# Patient Record
Sex: FEMALE | Race: OTHER | HISPANIC OR LATINO | URBAN - METROPOLITAN AREA
[De-identification: names, ages, dates, MRNs, and addresses within clinical notes are randomized per-mention and may not be internally consistent; named-entity substitution may affect disease eponyms.]

---

## 2018-05-24 ENCOUNTER — INPATIENT (INPATIENT)
Facility: HOSPITAL | Age: 33
LOS: 6 days | Discharge: HOME | End: 2018-05-31
Attending: OBSTETRICS & GYNECOLOGY | Admitting: OBSTETRICS & GYNECOLOGY
Payer: MEDICAID

## 2018-05-24 VITALS — WEIGHT: 123.46 LBS | HEIGHT: 61 IN

## 2018-05-24 DIAGNOSIS — C91.00 ACUTE LYMPHOBLASTIC LEUKEMIA NOT HAVING ACHIEVED REMISSION: ICD-10-CM

## 2018-05-24 DIAGNOSIS — D57.3 SICKLE-CELL TRAIT: ICD-10-CM

## 2018-05-24 DIAGNOSIS — D57.00 HB-SS DISEASE WITH CRISIS, UNSPECIFIED: ICD-10-CM

## 2018-05-24 DIAGNOSIS — R94.5 ABNORMAL RESULTS OF LIVER FUNCTION STUDIES: ICD-10-CM

## 2018-05-24 LAB
ALBUMIN SERPL ELPH-MCNC: 2.7 G/DL — LOW (ref 3.3–5)
ALP SERPL-CCNC: 275 U/L — HIGH (ref 40–120)
ALT FLD-CCNC: 95 U/L — HIGH (ref 10–45)
ANION GAP SERPL CALC-SCNC: 14 MMOL/L — SIGNIFICANT CHANGE UP (ref 5–17)
APPEARANCE UR: CLEAR — SIGNIFICANT CHANGE UP
APTT BLD: 25.7 SEC — LOW (ref 27.5–37.4)
AST SERPL-CCNC: 183 U/L — HIGH (ref 10–40)
BACTERIA # UR AUTO: ABNORMAL /HPF
BASOPHILS # BLD AUTO: 1.8 K/UL — HIGH (ref 0–0.2)
BILIRUB SERPL-MCNC: 6.7 MG/DL — HIGH (ref 0.2–1.2)
BILIRUB UR-MCNC: NEGATIVE — SIGNIFICANT CHANGE UP
BLD GP AB SCN SERPL QL: NEGATIVE — SIGNIFICANT CHANGE UP
BUN SERPL-MCNC: 9 MG/DL — SIGNIFICANT CHANGE UP (ref 7–23)
CALCIUM SERPL-MCNC: 9.1 MG/DL — SIGNIFICANT CHANGE UP (ref 8.4–10.5)
CHLORIDE SERPL-SCNC: 107 MMOL/L — SIGNIFICANT CHANGE UP (ref 96–108)
CO2 SERPL-SCNC: 20 MMOL/L — LOW (ref 22–31)
COLOR SPEC: YELLOW — SIGNIFICANT CHANGE UP
CREAT SERPL-MCNC: 0.6 MG/DL — SIGNIFICANT CHANGE UP (ref 0.5–1.3)
CULTURE RESULTS: NO GROWTH — SIGNIFICANT CHANGE UP
DACRYOCYTES BLD QL SMEAR: SLIGHT — SIGNIFICANT CHANGE UP
DIFF PNL FLD: ABNORMAL
ELLIPTOCYTES BLD QL SMEAR: SLIGHT — SIGNIFICANT CHANGE UP
EOSINOPHIL # BLD AUTO: 0.1 K/UL — SIGNIFICANT CHANGE UP (ref 0–0.5)
EPI CELLS # UR: SIGNIFICANT CHANGE UP /HPF
FIBRINOGEN PPP-MCNC: 876 MG/DL — SIGNIFICANT CHANGE UP (ref 310–510)
GIANT PLATELETS BLD QL SMEAR: PRESENT — SIGNIFICANT CHANGE UP
GLUCOSE SERPL-MCNC: 123 MG/DL — HIGH (ref 70–99)
GLUCOSE UR QL: NEGATIVE — SIGNIFICANT CHANGE UP
HAPTOGLOB SERPL-MCNC: <20 MG/DL — LOW (ref 34–200)
HCT VFR BLD CALC: 20.1 % — CRITICAL LOW (ref 34.5–45)
HCT VFR BLD CALC: 8.3 % — CRITICAL LOW (ref 34.5–45)
HGB BLD-MCNC: 3.1 G/DL — CRITICAL LOW (ref 11.5–15.5)
HGB BLD-MCNC: 7.2 G/DL — LOW (ref 11.5–15.5)
HOWELL-JOLLY BOD BLD QL SMEAR: PRESENT — SIGNIFICANT CHANGE UP
INR BLD: 1.04 RATIO — SIGNIFICANT CHANGE UP (ref 0.88–1.16)
KETONES UR-MCNC: NEGATIVE — SIGNIFICANT CHANGE UP
LDH SERPL L TO P-CCNC: 502 U/L — HIGH (ref 50–242)
LDH SERPL L TO P-CCNC: 714 U/L — HIGH (ref 50–242)
LEUKOCYTE ESTERASE UR-ACNC: NEGATIVE — SIGNIFICANT CHANGE UP
LG PLATELETS BLD QL AUTO: SLIGHT — SIGNIFICANT CHANGE UP
LYMPHOCYTES # BLD AUTO: 15 % — SIGNIFICANT CHANGE UP (ref 13–44)
LYMPHOCYTES # BLD AUTO: 2.7 K/UL — SIGNIFICANT CHANGE UP (ref 1–3.3)
MACROCYTES BLD QL: SIGNIFICANT CHANGE UP
MCHC RBC-ENTMCNC: 34 PG — SIGNIFICANT CHANGE UP (ref 27–34)
MCHC RBC-ENTMCNC: 35.6 GM/DL — SIGNIFICANT CHANGE UP (ref 32–36)
MCHC RBC-ENTMCNC: 35.8 PG — HIGH (ref 27–34)
MCHC RBC-ENTMCNC: 36.8 GM/DL — HIGH (ref 32–36)
MCV RBC AUTO: 95.3 FL — SIGNIFICANT CHANGE UP (ref 80–100)
MCV RBC AUTO: 97.3 FL — SIGNIFICANT CHANGE UP (ref 80–100)
METAMYELOCYTES # FLD: 2 % — HIGH (ref 0–0)
MICROCYTES BLD QL: SIGNIFICANT CHANGE UP
MONOCYTES # BLD AUTO: 0.7 K/UL — SIGNIFICANT CHANGE UP (ref 0–0.9)
MONOCYTES NFR BLD AUTO: 4 % — SIGNIFICANT CHANGE UP (ref 2–14)
NEUTROPHILS # BLD AUTO: 14.4 K/UL — HIGH (ref 1.8–7.4)
NEUTROPHILS NFR BLD AUTO: 79 % — HIGH (ref 43–77)
NITRITE UR-MCNC: NEGATIVE — SIGNIFICANT CHANGE UP
NRBC # BLD: 23 /100 — HIGH (ref 0–0)
OVALOCYTES BLD QL SMEAR: SIGNIFICANT CHANGE UP
PH UR: 6.5 — SIGNIFICANT CHANGE UP (ref 5–8)
PLAT MORPH BLD: ABNORMAL
PLATELET # BLD AUTO: 201 K/UL — SIGNIFICANT CHANGE UP (ref 150–400)
PLATELET # BLD AUTO: 492 K/UL — HIGH (ref 150–400)
POLYCHROMASIA BLD QL SMEAR: SIGNIFICANT CHANGE UP
POTASSIUM SERPL-MCNC: 4.6 MMOL/L — SIGNIFICANT CHANGE UP (ref 3.5–5.3)
POTASSIUM SERPL-SCNC: 4.6 MMOL/L — SIGNIFICANT CHANGE UP (ref 3.5–5.3)
PROT SERPL-MCNC: 6.9 G/DL — SIGNIFICANT CHANGE UP (ref 6–8.3)
PROT UR-MCNC: 30 MG/DL
PROTHROM AB SERPL-ACNC: 11.2 SEC — SIGNIFICANT CHANGE UP (ref 9.8–12.7)
RAPID RVP RESULT: SIGNIFICANT CHANGE UP
RBC # BLD: 0.85 M/UL — LOW (ref 3.8–5.2)
RBC # BLD: 0.85 M/UL — LOW (ref 3.8–5.2)
RBC # BLD: 2.11 M/UL — LOW (ref 3.8–5.2)
RBC # BLD: 2.11 M/UL — LOW (ref 3.8–5.2)
RBC # FLD: 22.3 % — HIGH (ref 10.3–14.5)
RBC # FLD: 23.3 % — HIGH (ref 10.3–14.5)
RBC BLD AUTO: ABNORMAL
RBC CASTS # UR COMP ASSIST: ABNORMAL /HPF (ref 0–2)
RETICS #: 350 K/UL — HIGH (ref 25–125)
RETICS #: SIGNIFICANT CHANGE UP K/UL (ref 25–125)
RETICS/RBC NFR: 16.5 % — HIGH (ref 0.5–2.5)
RETICS/RBC NFR: SIGNIFICANT CHANGE UP % (ref 0.5–2.5)
RH IG SCN BLD-IMP: POSITIVE — SIGNIFICANT CHANGE UP
RH IG SCN BLD-IMP: POSITIVE — SIGNIFICANT CHANGE UP
SCHISTOCYTES BLD QL AUTO: SLIGHT — SIGNIFICANT CHANGE UP
SICKLE CELLS BLD QL SMEAR: SIGNIFICANT CHANGE UP
SODIUM SERPL-SCNC: 141 MMOL/L — SIGNIFICANT CHANGE UP (ref 135–145)
SP GR SPEC: 1 — LOW (ref 1.01–1.02)
SPECIMEN SOURCE: SIGNIFICANT CHANGE UP
T PALLIDUM AB TITR SER: NEGATIVE — SIGNIFICANT CHANGE UP
TARGETS BLD QL SMEAR: SLIGHT — SIGNIFICANT CHANGE UP
URATE SERPL-MCNC: 6.6 MG/DL — SIGNIFICANT CHANGE UP (ref 2.5–7)
UROBILINOGEN FLD QL: 1 MG/DL
WBC # BLD: 11.4 K/UL — HIGH (ref 3.8–10.5)
WBC # BLD: 18.3 K/UL — HIGH (ref 3.8–10.5)
WBC # FLD AUTO: 11.4 K/UL — HIGH (ref 3.8–10.5)
WBC # FLD AUTO: 18.3 K/UL — HIGH (ref 3.8–10.5)
WBC UR QL: SIGNIFICANT CHANGE UP /HPF (ref 0–5)

## 2018-05-24 PROCEDURE — 99252 IP/OBS CONSLTJ NEW/EST SF 35: CPT

## 2018-05-24 PROCEDURE — 83020 HEMOGLOBIN ELECTROPHORESIS: CPT | Mod: 26

## 2018-05-24 PROCEDURE — 59409 OBSTETRICAL CARE: CPT | Mod: U7

## 2018-05-24 PROCEDURE — 71046 X-RAY EXAM CHEST 2 VIEWS: CPT | Mod: 26

## 2018-05-24 PROCEDURE — 99223 1ST HOSP IP/OBS HIGH 75: CPT | Mod: GC

## 2018-05-24 RX ORDER — ASPIRIN/CALCIUM CARB/MAGNESIUM 324 MG
81 TABLET ORAL DAILY
Refills: 0 | Status: DISCONTINUED | OUTPATIENT
Start: 2018-05-24 | End: 2018-05-27

## 2018-05-24 RX ORDER — HEPARIN SODIUM 5000 [USP'U]/ML
5000 INJECTION INTRAVENOUS; SUBCUTANEOUS EVERY 12 HOURS
Refills: 0 | Status: DISCONTINUED | OUTPATIENT
Start: 2018-05-24 | End: 2018-05-27

## 2018-05-24 RX ORDER — MORPHINE SULFATE 50 MG/1
4 CAPSULE, EXTENDED RELEASE ORAL EVERY 4 HOURS
Refills: 0 | Status: DISCONTINUED | OUTPATIENT
Start: 2018-05-24 | End: 2018-05-25

## 2018-05-24 RX ORDER — SODIUM CHLORIDE 9 MG/ML
1000 INJECTION, SOLUTION INTRAVENOUS
Refills: 0 | Status: DISCONTINUED | OUTPATIENT
Start: 2018-05-24 | End: 2018-05-27

## 2018-05-24 RX ORDER — SODIUM CHLORIDE 9 MG/ML
1000 INJECTION, SOLUTION INTRAVENOUS
Refills: 0 | Status: DISCONTINUED | OUTPATIENT
Start: 2018-05-24 | End: 2018-05-24

## 2018-05-24 RX ORDER — SODIUM CHLORIDE 9 MG/ML
1000 INJECTION INTRAMUSCULAR; INTRAVENOUS; SUBCUTANEOUS
Refills: 0 | Status: DISCONTINUED | OUTPATIENT
Start: 2018-05-24 | End: 2018-05-24

## 2018-05-24 RX ORDER — SODIUM CHLORIDE 9 MG/ML
1000 INJECTION INTRAMUSCULAR; INTRAVENOUS; SUBCUTANEOUS ONCE
Refills: 0 | Status: COMPLETED | OUTPATIENT
Start: 2018-05-24 | End: 2018-05-24

## 2018-05-24 RX ORDER — FOLIC ACID 0.8 MG
0.8 TABLET ORAL DAILY
Refills: 0 | Status: DISCONTINUED | OUTPATIENT
Start: 2018-05-24 | End: 2018-05-31

## 2018-05-24 RX ORDER — URSODIOL 250 MG/1
300 TABLET, FILM COATED ORAL EVERY 8 HOURS
Refills: 0 | Status: DISCONTINUED | OUTPATIENT
Start: 2018-05-24 | End: 2018-05-28

## 2018-05-24 RX ADMIN — MORPHINE SULFATE 4 MILLIGRAM(S): 50 CAPSULE, EXTENDED RELEASE ORAL at 01:21

## 2018-05-24 RX ADMIN — MORPHINE SULFATE 4 MILLIGRAM(S): 50 CAPSULE, EXTENDED RELEASE ORAL at 02:45

## 2018-05-24 RX ADMIN — URSODIOL 300 MILLIGRAM(S): 250 TABLET, FILM COATED ORAL at 14:06

## 2018-05-24 RX ADMIN — Medication 0.8 MILLIGRAM(S): at 05:21

## 2018-05-24 RX ADMIN — MORPHINE SULFATE 4 MILLIGRAM(S): 50 CAPSULE, EXTENDED RELEASE ORAL at 16:10

## 2018-05-24 RX ADMIN — MORPHINE SULFATE 4 MILLIGRAM(S): 50 CAPSULE, EXTENDED RELEASE ORAL at 21:46

## 2018-05-24 RX ADMIN — MORPHINE SULFATE 4 MILLIGRAM(S): 50 CAPSULE, EXTENDED RELEASE ORAL at 14:23

## 2018-05-24 RX ADMIN — MORPHINE SULFATE 4 MILLIGRAM(S): 50 CAPSULE, EXTENDED RELEASE ORAL at 18:20

## 2018-05-24 RX ADMIN — MORPHINE SULFATE 4 MILLIGRAM(S): 50 CAPSULE, EXTENDED RELEASE ORAL at 23:56

## 2018-05-24 RX ADMIN — Medication 1 TABLET(S): at 05:18

## 2018-05-24 RX ADMIN — MORPHINE SULFATE 4 MILLIGRAM(S): 50 CAPSULE, EXTENDED RELEASE ORAL at 22:57

## 2018-05-24 RX ADMIN — SODIUM CHLORIDE 200 MILLILITER(S): 9 INJECTION INTRAMUSCULAR; INTRAVENOUS; SUBCUTANEOUS at 01:53

## 2018-05-24 RX ADMIN — SODIUM CHLORIDE 2000 MILLILITER(S): 9 INJECTION INTRAMUSCULAR; INTRAVENOUS; SUBCUTANEOUS at 00:13

## 2018-05-24 RX ADMIN — MORPHINE SULFATE 4 MILLIGRAM(S): 50 CAPSULE, EXTENDED RELEASE ORAL at 21:14

## 2018-05-24 RX ADMIN — MORPHINE SULFATE 4 MILLIGRAM(S): 50 CAPSULE, EXTENDED RELEASE ORAL at 15:39

## 2018-05-24 RX ADMIN — SODIUM CHLORIDE 200 MILLILITER(S): 9 INJECTION, SOLUTION INTRAVENOUS at 21:27

## 2018-05-24 RX ADMIN — MORPHINE SULFATE 4 MILLIGRAM(S): 50 CAPSULE, EXTENDED RELEASE ORAL at 18:56

## 2018-05-24 RX ADMIN — URSODIOL 300 MILLIGRAM(S): 250 TABLET, FILM COATED ORAL at 21:33

## 2018-05-24 RX ADMIN — Medication 81 MILLIGRAM(S): at 05:20

## 2018-05-24 RX ADMIN — URSODIOL 300 MILLIGRAM(S): 250 TABLET, FILM COATED ORAL at 05:15

## 2018-05-24 NOTE — CONSULT NOTE ADULT - PROBLEM SELECTOR RECOMMENDATION 2
- significant LFT abnormalities, suggesting hepatocellular damage and significantly low albumin, does not appear well explained by cholestasis alone  - trend CMP daily, check RUQ US

## 2018-05-24 NOTE — CONSULT NOTE ADULT - PROBLEM SELECTOR RECOMMENDATION 9
- IVF with 1/2NS, pain control with opioids  - check Hb electrophoresis  - check RVP, f/u XR final read, no consolidation on my read but has left basilar crackles  - incentive spirometry  - low threshold for starting antibiotics and/or simple transfusion if SOB/chest pain worsens

## 2018-05-24 NOTE — PATIENT PROFILE OB - ALERT: PERTINENT HISTORY
Patient desires tubal ligation/1st Trimester Sonogram/20 Week Level II Sonogram/BioPhysical Profile(s)/Fetal Non-Stress Test (NST)/Ultra Screen at 12 Weeks

## 2018-05-24 NOTE — CONSULT NOTE ADULT - ASSESSMENT
33F PM reported sickle cell disease, now 32 weeks present presents with generalized pain, SOB, cough, findings concerning for vaso-occlusive crisis

## 2018-05-25 ENCOUNTER — ASOB RESULT (OUTPATIENT)
Age: 33
End: 2018-05-25

## 2018-05-25 ENCOUNTER — APPOINTMENT (OUTPATIENT)
Dept: ANTEPARTUM | Facility: CLINIC | Age: 33
End: 2018-05-25

## 2018-05-25 PROBLEM — Z00.00 ENCOUNTER FOR PREVENTIVE HEALTH EXAMINATION: Status: ACTIVE | Noted: 2018-05-25

## 2018-05-25 LAB
ALBUMIN SERPL ELPH-MCNC: 2.6 G/DL — LOW (ref 3.3–5)
ALP SERPL-CCNC: 228 U/L — HIGH (ref 40–120)
ALT FLD-CCNC: 82 U/L — HIGH (ref 10–45)
ANION GAP SERPL CALC-SCNC: 11 MMOL/L — SIGNIFICANT CHANGE UP (ref 5–17)
AST SERPL-CCNC: 164 U/L — HIGH (ref 10–40)
BASOPHILS # BLD AUTO: 0 K/UL — SIGNIFICANT CHANGE UP (ref 0–0.2)
BILIRUB SERPL-MCNC: 4.6 MG/DL — HIGH (ref 0.2–1.2)
BUN SERPL-MCNC: 8 MG/DL — SIGNIFICANT CHANGE UP (ref 7–23)
CALCIUM SERPL-MCNC: 8.7 MG/DL — SIGNIFICANT CHANGE UP (ref 8.4–10.5)
CHLORIDE SERPL-SCNC: 104 MMOL/L — SIGNIFICANT CHANGE UP (ref 96–108)
CO2 SERPL-SCNC: 19 MMOL/L — LOW (ref 22–31)
COLLECT DURATION TIME UR: 24 HR — SIGNIFICANT CHANGE UP
CREAT SERPL-MCNC: 0.62 MG/DL — SIGNIFICANT CHANGE UP (ref 0.5–1.3)
EOSINOPHIL # BLD AUTO: 0.1 K/UL — SIGNIFICANT CHANGE UP (ref 0–0.5)
GLUCOSE SERPL-MCNC: 96 MG/DL — SIGNIFICANT CHANGE UP (ref 70–99)
GROUP B BETA STREP DNA (PCR): SIGNIFICANT CHANGE UP
GROUP B BETA STREP INTERPRETATION: SIGNIFICANT CHANGE UP
HAPTOGLOB SERPL-MCNC: <20 MG/DL — LOW (ref 34–200)
HCT VFR BLD CALC: 18.8 % — CRITICAL LOW (ref 34.5–45)
HGB BLD-MCNC: 6.6 G/DL — CRITICAL LOW (ref 11.5–15.5)
LDH SERPL L TO P-CCNC: 761 U/L — HIGH (ref 50–242)
LYMPHOCYTES # BLD AUTO: 16 % — SIGNIFICANT CHANGE UP (ref 13–44)
LYMPHOCYTES # BLD AUTO: 3 K/UL — SIGNIFICANT CHANGE UP (ref 1–3.3)
MCHC RBC-ENTMCNC: 33.3 PG — SIGNIFICANT CHANGE UP (ref 27–34)
MCHC RBC-ENTMCNC: 34.9 GM/DL — SIGNIFICANT CHANGE UP (ref 32–36)
MCV RBC AUTO: 95.5 FL — SIGNIFICANT CHANGE UP (ref 80–100)
MONOCYTES # BLD AUTO: 2.9 K/UL — HIGH (ref 0–0.9)
MONOCYTES NFR BLD AUTO: 8 % — SIGNIFICANT CHANGE UP (ref 2–14)
NEUTROPHILS # BLD AUTO: 15.8 K/UL — HIGH (ref 1.8–7.4)
NEUTROPHILS NFR BLD AUTO: 69 % — SIGNIFICANT CHANGE UP (ref 43–77)
PLATELET # BLD AUTO: 369 K/UL — SIGNIFICANT CHANGE UP (ref 150–400)
POTASSIUM SERPL-MCNC: 4.2 MMOL/L — SIGNIFICANT CHANGE UP (ref 3.5–5.3)
POTASSIUM SERPL-SCNC: 4.2 MMOL/L — SIGNIFICANT CHANGE UP (ref 3.5–5.3)
PROT 24H UR-MRATE: 280 MG/24 H — HIGH (ref 50–100)
PROT SERPL-MCNC: 6.9 G/DL — SIGNIFICANT CHANGE UP (ref 6–8.3)
RBC # BLD: 1.97 M/UL — LOW (ref 3.8–5.2)
RBC # BLD: 1.97 M/UL — LOW (ref 3.8–5.2)
RBC # FLD: 23.6 % — HIGH (ref 10.3–14.5)
RETICS #: 269 K/UL — HIGH (ref 25–125)
RETICS/RBC NFR: 13.7 % — HIGH (ref 0.5–2.5)
SODIUM SERPL-SCNC: 134 MMOL/L — LOW (ref 135–145)
SOURCE GROUP B STREP: SIGNIFICANT CHANGE UP
TOTAL VOLUME - 24 HOUR: 4000 ML — SIGNIFICANT CHANGE UP
URATE SERPL-MCNC: 6.6 MG/DL — SIGNIFICANT CHANGE UP (ref 2.5–7)
URINE CREATININE CALCULATION: 0.7 G/24 H — LOW (ref 0.8–1.8)
WBC # BLD: 21.8 K/UL — HIGH (ref 3.8–10.5)
WBC # FLD AUTO: 21.8 K/UL — HIGH (ref 3.8–10.5)

## 2018-05-25 PROCEDURE — 99232 SBSQ HOSP IP/OBS MODERATE 35: CPT

## 2018-05-25 PROCEDURE — 76700 US EXAM ABDOM COMPLETE: CPT | Mod: 26

## 2018-05-25 PROCEDURE — 99232 SBSQ HOSP IP/OBS MODERATE 35: CPT | Mod: GC

## 2018-05-25 RX ORDER — HYDROMORPHONE HYDROCHLORIDE 2 MG/ML
0.5 INJECTION INTRAMUSCULAR; INTRAVENOUS; SUBCUTANEOUS
Refills: 0 | Status: DISCONTINUED | OUTPATIENT
Start: 2018-05-25 | End: 2018-05-26

## 2018-05-25 RX ORDER — HYDROMORPHONE HYDROCHLORIDE 2 MG/ML
2 INJECTION INTRAMUSCULAR; INTRAVENOUS; SUBCUTANEOUS
Refills: 0 | Status: DISCONTINUED | OUTPATIENT
Start: 2018-05-25 | End: 2018-05-25

## 2018-05-25 RX ORDER — HYDROMORPHONE HYDROCHLORIDE 2 MG/ML
2 INJECTION INTRAMUSCULAR; INTRAVENOUS; SUBCUTANEOUS ONCE
Refills: 0 | Status: DISCONTINUED | OUTPATIENT
Start: 2018-05-25 | End: 2018-05-25

## 2018-05-25 RX ORDER — MORPHINE SULFATE 50 MG/1
4 CAPSULE, EXTENDED RELEASE ORAL ONCE
Refills: 0 | Status: DISCONTINUED | OUTPATIENT
Start: 2018-05-25 | End: 2018-05-25

## 2018-05-25 RX ORDER — HYDROMORPHONE HYDROCHLORIDE 2 MG/ML
1 INJECTION INTRAMUSCULAR; INTRAVENOUS; SUBCUTANEOUS EVERY 4 HOURS
Refills: 0 | Status: DISCONTINUED | OUTPATIENT
Start: 2018-05-25 | End: 2018-05-26

## 2018-05-25 RX ORDER — SODIUM CHLORIDE 9 MG/ML
1000 INJECTION INTRAMUSCULAR; INTRAVENOUS; SUBCUTANEOUS
Refills: 0 | Status: DISCONTINUED | OUTPATIENT
Start: 2018-05-25 | End: 2018-05-27

## 2018-05-25 RX ORDER — HYDROMORPHONE HYDROCHLORIDE 2 MG/ML
2 INJECTION INTRAMUSCULAR; INTRAVENOUS; SUBCUTANEOUS
Refills: 0 | Status: DISCONTINUED | OUTPATIENT
Start: 2018-05-25 | End: 2018-05-26

## 2018-05-25 RX ORDER — HYDROMORPHONE HYDROCHLORIDE 2 MG/ML
4 INJECTION INTRAMUSCULAR; INTRAVENOUS; SUBCUTANEOUS
Refills: 0 | Status: DISCONTINUED | OUTPATIENT
Start: 2018-05-25 | End: 2018-05-25

## 2018-05-25 RX ADMIN — Medication 0.8 MILLIGRAM(S): at 14:46

## 2018-05-25 RX ADMIN — HYDROMORPHONE HYDROCHLORIDE 2 MILLIGRAM(S): 2 INJECTION INTRAMUSCULAR; INTRAVENOUS; SUBCUTANEOUS at 12:20

## 2018-05-25 RX ADMIN — Medication 81 MILLIGRAM(S): at 14:35

## 2018-05-25 RX ADMIN — URSODIOL 300 MILLIGRAM(S): 250 TABLET, FILM COATED ORAL at 05:40

## 2018-05-25 RX ADMIN — HYDROMORPHONE HYDROCHLORIDE 2 MILLIGRAM(S): 2 INJECTION INTRAMUSCULAR; INTRAVENOUS; SUBCUTANEOUS at 02:19

## 2018-05-25 RX ADMIN — HYDROMORPHONE HYDROCHLORIDE 0.5 MILLIGRAM(S): 2 INJECTION INTRAMUSCULAR; INTRAVENOUS; SUBCUTANEOUS at 21:13

## 2018-05-25 RX ADMIN — URSODIOL 300 MILLIGRAM(S): 250 TABLET, FILM COATED ORAL at 14:47

## 2018-05-25 RX ADMIN — HYDROMORPHONE HYDROCHLORIDE 2 MILLIGRAM(S): 2 INJECTION INTRAMUSCULAR; INTRAVENOUS; SUBCUTANEOUS at 06:51

## 2018-05-25 RX ADMIN — HYDROMORPHONE HYDROCHLORIDE 2 MILLIGRAM(S): 2 INJECTION INTRAMUSCULAR; INTRAVENOUS; SUBCUTANEOUS at 08:35

## 2018-05-25 RX ADMIN — HYDROMORPHONE HYDROCHLORIDE 2 MILLIGRAM(S): 2 INJECTION INTRAMUSCULAR; INTRAVENOUS; SUBCUTANEOUS at 01:52

## 2018-05-25 RX ADMIN — SODIUM CHLORIDE 200 MILLILITER(S): 9 INJECTION, SOLUTION INTRAVENOUS at 06:54

## 2018-05-25 RX ADMIN — HYDROMORPHONE HYDROCHLORIDE 2 MILLIGRAM(S): 2 INJECTION INTRAMUSCULAR; INTRAVENOUS; SUBCUTANEOUS at 12:01

## 2018-05-25 RX ADMIN — Medication 1 TABLET(S): at 14:35

## 2018-05-25 RX ADMIN — MORPHINE SULFATE 4 MILLIGRAM(S): 50 CAPSULE, EXTENDED RELEASE ORAL at 00:55

## 2018-05-25 RX ADMIN — MORPHINE SULFATE 4 MILLIGRAM(S): 50 CAPSULE, EXTENDED RELEASE ORAL at 06:21

## 2018-05-25 RX ADMIN — HYDROMORPHONE HYDROCHLORIDE 2 MILLIGRAM(S): 2 INJECTION INTRAMUSCULAR; INTRAVENOUS; SUBCUTANEOUS at 07:13

## 2018-05-25 RX ADMIN — HYDROMORPHONE HYDROCHLORIDE 2 MILLIGRAM(S): 2 INJECTION INTRAMUSCULAR; INTRAVENOUS; SUBCUTANEOUS at 08:56

## 2018-05-25 RX ADMIN — MORPHINE SULFATE 4 MILLIGRAM(S): 50 CAPSULE, EXTENDED RELEASE ORAL at 03:49

## 2018-05-25 RX ADMIN — URSODIOL 300 MILLIGRAM(S): 250 TABLET, FILM COATED ORAL at 23:20

## 2018-05-25 RX ADMIN — SODIUM CHLORIDE 200 MILLILITER(S): 9 INJECTION, SOLUTION INTRAVENOUS at 12:01

## 2018-05-25 RX ADMIN — MORPHINE SULFATE 4 MILLIGRAM(S): 50 CAPSULE, EXTENDED RELEASE ORAL at 04:21

## 2018-05-25 RX ADMIN — MORPHINE SULFATE 4 MILLIGRAM(S): 50 CAPSULE, EXTENDED RELEASE ORAL at 05:36

## 2018-05-25 RX ADMIN — MORPHINE SULFATE 4 MILLIGRAM(S): 50 CAPSULE, EXTENDED RELEASE ORAL at 00:26

## 2018-05-25 NOTE — PROGRESS NOTE ADULT - ASSESSMENT
Pt currently pregnant, and in Sickle Cell Crisis. She is currently on Dilaudid 2mg IVP Q3hrs, and Dilaudid 2mg IVP Q3hrs PRN.

## 2018-05-25 NOTE — CHART NOTE - NSCHARTNOTEFT_GEN_A_CORE
Note pasted from CPN (EMR used in OB)    R3oB update    33yoF  at 32w6d with Sickle cell (SF) disease and vaso-occlusive episode in joints and cholestasis of pregnancy. Patient requiring 2mg IVP dilaudid x 3 doses from 6am-12pm to bring pain from >10/10 to 6/10. Patient reports it is tolerable now. She does not use narcotic medication at home. She uses only tylenol and motrin prn for pain. She usually feels fatigue with crises, but pain is not a big symptom. She tolerated abdominal sono and fetal sono. Tolerating po food.     VS: 37.0, 99, 134/89, 18, 96% on 3L NC  Gen: NAD  CV: RRR, LLL crackles (similar to prior)  Abd: soft, ntnd, gravid  Ext: nonedematous b/l, very TTP    NST: 145/mod rona/10x10 accels/no decels  (not reactive with 15x15)  [place]: no ctx    AP:   Vaso-occlusive crisis  - cont IV dilaudid medication  - will move to labor floor for continuous pulse ox and closer monitoring as titrating the narcotic medication  -d/w chronic pain and anesthesia for appropriate pain medication regimen  -continuous pulse ox monitoring  -continuous efm/toco  -2uRBC for pain crises  - Heme c/s appreciated    MWB  - CXR  clear lungs  - reg diet  -bed pan due to pt unable to walk due to pain  - pulse ox    FWB  - cont efm/toco  - overall reassuring fetal status  - decreased accels/variability likely due to narcotic medication  - BPP reassuring and growth 40%ile  - no immediate plan for delivery    d/w Dr Roland Santiago MD R3  ----  MFM attending addendum    Patient seen and examined multiple times today. Pain worsening since yesterday. Respiratory status unchanged, and CXR nl.     Transfer to L&D for closer observation while titrating IV opioids for pain control with anesthesia/pain management team. Will transfuse 2u pRBC.    Fetal growth AGA on sono today (40th%), and fetal monitoring reassuring. BPP 8/10, non-reactive NST but moderate variability. Continuous fetal monitoring not needed as long as no changes in maternal status (no deterioration in respiratory status and remains hemodynamically stable). If maternal status remains unchanged plan for daily BPP with twice daily NST. If changes in maternal status, continuous fetal monitoring indicated.     DVT ppx strongly recommended, and rationale discussed with patient. She is refusing heparin and venodynes currently. Will continue to work with patient to readdress plan for ppx.     MIKIE Watkins MD

## 2018-05-25 NOTE — PROGRESS NOTE ADULT - SUBJECTIVE AND OBJECTIVE BOX
Pt seen, laying in bed appears to be in a lot of pain. RN at bedside administering IV Dilaudid as ordered. Pt states she in excruciating pain all over a 10 out of 10. States that the medication helps but does not last long.

## 2018-05-25 NOTE — CHART NOTE - NSCHARTNOTEFT_GEN_A_CORE
R3oB update    33yoF  at 32w6d with Sickle cell (SF) disease and vaso-occlusive episode in joints and cholestasis of pregnancy. Patient requiring 2mg IVP dilaudid x 3 doses from 6am-12pm to bring pain from >10/10 to 6/10. Patient reports it is tolerable now. She does not use narcotic medication at home. She uses only tylenol and motrin prn for pain. She usually feels fatigue with crises, but pain is not a big symptom. She tolerated abdominal sono and fetal sono. Tolerating po food.     She typically has crises only once every 2-3 years. She does not have a Hematologist that she follows with consistently. She only sees Heme when she is admitted to hospital in crises. She last saw someone at [facility] and prior to that it was a Hematologist at [facility]. She takes ibuprofen and tylenol for pain prn. She does not take narcotic medication at baseline.     VS: 37.0, 99, 134/89, 18, 96% on 3L NC  Gen: NAD  CV: RRR, LLL crackles (similar to prior)  Abd: soft, ntnd, gravid  Ext: nonedematous b/l, very TTP    NST: 145/mod rona/10x10 accels/no decels  (not reactive with 15x15)  [place]: no ctx    AP:   Vaso-occlusive crisis  - cont IV dilaudid medication, consider PCA prn  - will move to labor floor for continuous pulse ox and closer monitoring as titrating the narcotic medication  -d/w chronic pain and anesthesia for appropriate pain medication regimen  -continuous pulse ox monitoring  -continuous efm/toco  -2uRBC for pain crises  - Heme c/s appreciated    MWB  - CXR  clear lungs  - reg diet  -bed pan due to pt unable to walk due to pain  - pulse ox    FWB  - cont efm/toco  - overall reassuring fetal status  - decreased accels/variability likely due to narcotic medication  - BPP reassuring and growth 40%ile  - no immediate plan for delivery    d/w Dr Roland Santiago MD R3

## 2018-05-25 NOTE — PROGRESS NOTE ADULT - ATTENDING COMMENTS
33 F who is currently 32 weeks pregnant admitted for VOC.  She has no evidence of ACS though with small crackles at left base, CXR is clear and she is not hypoxic with no chest pain.  Her pain is better controlled on the IV dilaudid.  Continue IV hydration, folic acid.   Agree with transfusion of PRBC at this time.  Patient also has cholestasis of pregnancy, abdominal sonogram is unrevealing.   Close observation.

## 2018-05-25 NOTE — PROGRESS NOTE ADULT - PROBLEM SELECTOR PLAN 1
- increasing LDH, falling H/H  - patient clinically worse today  - transfuse 2 units PRBC, discussed with blood bank, OB resident  - c/w IVF  - consult palliative care or pain management for adequate pain control  - check daily LDH, CMP, reticulocyte count, and haptoglobin - increasing LDH, falling H/H  - patient clinically worse today  - transfuse 2 units PRBC, discussed with blood bank, OB resident  - c/w IVF  - consult palliative care or pain management for adequate pain control  - check daily LDH, CMP, reticulocyte count, and haptoglobin  - hemoglobin electrophoresis pending - increasing LDH, falling H/H  - patient clinically worse today  - transfuse 2 units PRBC, discussed with blood bank, OB resident  - c/w IVF  - consult palliative care or pain management for adequate pain control  - check daily LDH, CMP, reticulocyte count, and haptoglobin  - hemoglobin electrophoresis pending  - please provide patient incentive spirometer - increasing LDH, falling H/H  - patient clinically worse today  - transfuse 2 units PRBC, discussed with blood bank, OB resident, no history of transfusion reaction or antibodies, only 10 tx in her lifetime per patient  - c/w IVF  - consult palliative care or pain management for adequate pain control  - check daily LDH, CMP, reticulocyte count, and haptoglobin  - hemoglobin electrophoresis pending  - please provide patient incentive spirometer

## 2018-05-25 NOTE — PROGRESS NOTE ADULT - SUBJECTIVE AND OBJECTIVE BOX
INTERVAL HPI/OVERNIGHT EVENTS:  Patient in increasing pain today, 10/10, with increasing SOB. RVP and CXR negative. Increasing LDH.    VITAL SIGNS:  T(F): 99 (18 @ 12:00)  HR: 111 (18 @ 12:20)  BP: 126/86 (18 @ 12:20)  RR: 17 (18 @ 12:20)  SpO2: 95% (18 @ 12:20)  Wt(kg): --    PHYSICAL EXAM:    Constitutional: uncomfortable, tachypneic, in significant distress  Eyes: EOMI, sclera non-icteric  Neck: supple, no masses, no JVD  Respiratory: CTA b/l, good air entry b/l  Cardiovascular: RRR, no M/R/G  Gastrointestinal: soft, NTND, no masses palpable, + BS, no hepatosplenomegaly  Extremities: no c/c/e  Neurological: AAOx3      MEDICATIONS  (STANDING):  aspirin  chewable 81 milliGRAM(s) Oral daily  folic acid 0.8 milliGRAM(s) Oral daily  heparin  Injectable 5000 Unit(s) SubCutaneous every 12 hours  HYDROmorphone  Injectable 2 milliGRAM(s) IV Push every 3 hours  prenatal multivitamin 1 Tablet(s) Oral daily  sodium chloride 0.45%. 1000 milliLiter(s) (200 mL/Hr) IV Continuous <Continuous>  sodium chloride 0.9%. 1000 milliLiter(s) (100 mL/Hr) IV Continuous <Continuous>  ursodiol Capsule 300 milliGRAM(s) Oral every 8 hours    MEDICATIONS  (PRN):  HYDROmorphone  Injectable 2 milliGRAM(s) IV Push every 3 hours PRN Severe Pain (7 - 10)      Allergies    No Known Allergies    Intolerances        LABS:                        6.6    21.8  )-----------( 369      ( 25 May 2018 07:19 )             18.8         134<L>  |  104  |  8   ----------------------------<  96  4.2   |  19<L>  |  0.62    Ca    8.7      25 May 2018 07:19    TPro  6.9  /  Alb  2.6<L>  /  TBili  4.6<H>  /  DBili  x   /  AST  164<H>  /  ALT  82<H>  /  AlkPhos  228<H>  05-25    PT/INR - ( 24 May 2018 02:11 )   PT: 11.2 sec;   INR: 1.04 ratio         PTT - ( 24 May 2018 02:11 )  PTT:25.7 sec  Urinalysis Basic - ( 24 May 2018 01:10 )    Color: Yellow / Appearance: Clear / S.005 / pH: x  Gluc: x / Ketone: Negative  / Bili: Negative / Urobili: 1 mg/dL   Blood: x / Protein: 30 mg/dL / Nitrite: Negative   Leuk Esterase: Negative / RBC: 2-5 /HPF / WBC 2-5 /HPF   Sq Epi: x / Non Sq Epi: Occasional /HPF / Bacteria: Few /HPF        RADIOLOGY & ADDITIONAL TESTS:  Studies reviewed.    ASSESSMENT & PLAN:

## 2018-05-26 LAB
ALBUMIN SERPL ELPH-MCNC: 2.5 G/DL — LOW (ref 3.3–5)
ALP SERPL-CCNC: 220 U/L — HIGH (ref 40–120)
ALT FLD-CCNC: 69 U/L — HIGH (ref 10–45)
ANION GAP SERPL CALC-SCNC: 12 MMOL/L — SIGNIFICANT CHANGE UP (ref 5–17)
ANISOCYTOSIS BLD QL: SIGNIFICANT CHANGE UP
APPEARANCE UR: CLEAR — SIGNIFICANT CHANGE UP
APTT BLD: 25.8 SEC — LOW (ref 27.5–37.4)
AST SERPL-CCNC: 121 U/L — HIGH (ref 10–40)
BASOPHILS # BLD AUTO: 0.3 K/UL — HIGH (ref 0–0.2)
BILE AC FLD-MCNC: 13 UMOL/L — SIGNIFICANT CHANGE UP (ref 4.7–24.5)
BILIRUB SERPL-MCNC: 3.9 MG/DL — HIGH (ref 0.2–1.2)
BILIRUB UR-MCNC: NEGATIVE — SIGNIFICANT CHANGE UP
BUN SERPL-MCNC: 8 MG/DL — SIGNIFICANT CHANGE UP (ref 7–23)
CALCIUM SERPL-MCNC: 9.2 MG/DL — SIGNIFICANT CHANGE UP (ref 8.4–10.5)
CHLORIDE SERPL-SCNC: 108 MMOL/L — SIGNIFICANT CHANGE UP (ref 96–108)
CO2 SERPL-SCNC: 20 MMOL/L — LOW (ref 22–31)
COLOR SPEC: YELLOW — SIGNIFICANT CHANGE UP
CREAT SERPL-MCNC: 0.64 MG/DL — SIGNIFICANT CHANGE UP (ref 0.5–1.3)
DACRYOCYTES BLD QL SMEAR: SLIGHT — SIGNIFICANT CHANGE UP
DIFF PNL FLD: NEGATIVE — SIGNIFICANT CHANGE UP
ELLIPTOCYTES BLD QL SMEAR: SLIGHT — SIGNIFICANT CHANGE UP
EOSINOPHIL # BLD AUTO: 0.1 K/UL — SIGNIFICANT CHANGE UP (ref 0–0.5)
FIBRINOGEN PPP-MCNC: 922 MG/DL — HIGH (ref 310–510)
GIANT PLATELETS BLD QL SMEAR: PRESENT — SIGNIFICANT CHANGE UP
GLUCOSE SERPL-MCNC: 105 MG/DL — HIGH (ref 70–99)
GLUCOSE UR QL: NEGATIVE MG/DL — SIGNIFICANT CHANGE UP
HCT VFR BLD CALC: 24.1 % — LOW (ref 34.5–45)
HEMOGLOBIN INTERPRETATION: SIGNIFICANT CHANGE UP
HGB A MFR BLD: 0 % — LOW (ref 95.8–98)
HGB A2 MFR BLD: 3.3 % — HIGH (ref 2–3.2)
HGB BLD-MCNC: 8.9 G/DL — LOW (ref 11.5–15.5)
HGB F MFR BLD: 6.6 % — HIGH (ref 0–1)
HGB S MFR BLD: 90.1 % — HIGH
INR BLD: 1.08 RATIO — SIGNIFICANT CHANGE UP (ref 0.88–1.16)
KETONES UR-MCNC: NEGATIVE — SIGNIFICANT CHANGE UP
LDH SERPL L TO P-CCNC: 749 U/L — HIGH (ref 50–242)
LEUKOCYTE ESTERASE UR-ACNC: NEGATIVE — SIGNIFICANT CHANGE UP
LG PLATELETS BLD QL AUTO: SLIGHT — SIGNIFICANT CHANGE UP
LYMPHOCYTES # BLD AUTO: 13 % — SIGNIFICANT CHANGE UP (ref 13–44)
LYMPHOCYTES # BLD AUTO: 31.4 K/UL — HIGH (ref 1–3.3)
MCHC RBC-ENTMCNC: 34.3 PG — HIGH (ref 27–34)
MCHC RBC-ENTMCNC: 36.9 GM/DL — HIGH (ref 32–36)
MCV RBC AUTO: 92.9 FL — SIGNIFICANT CHANGE UP (ref 80–100)
METAMYELOCYTES # FLD: 2 % — HIGH (ref 0–0)
MONOCYTES # BLD AUTO: 1.8 K/UL — HIGH (ref 0–0.9)
MONOCYTES NFR BLD AUTO: 9 % — SIGNIFICANT CHANGE UP (ref 2–14)
MYELOCYTES NFR BLD: 2 % — HIGH (ref 0–0)
NEUTROPHILS # BLD AUTO: 9.8 K/UL — HIGH (ref 1.8–7.4)
NEUTROPHILS NFR BLD AUTO: 73 % — SIGNIFICANT CHANGE UP (ref 43–77)
NEUTS BAND # BLD: 1 % — SIGNIFICANT CHANGE UP (ref 0–8)
NITRITE UR-MCNC: NEGATIVE — SIGNIFICANT CHANGE UP
NRBC # BLD: 81 /100 — HIGH (ref 0–0)
OVALOCYTES BLD QL SMEAR: SLIGHT — SIGNIFICANT CHANGE UP
PH UR: 6.5 — SIGNIFICANT CHANGE UP (ref 5–8)
PLAT MORPH BLD: ABNORMAL
PLATELET # BLD AUTO: 491 K/UL — HIGH (ref 150–400)
POIKILOCYTOSIS BLD QL AUTO: SIGNIFICANT CHANGE UP
POLYCHROMASIA BLD QL SMEAR: SIGNIFICANT CHANGE UP
POTASSIUM SERPL-MCNC: 4.3 MMOL/L — SIGNIFICANT CHANGE UP (ref 3.5–5.3)
POTASSIUM SERPL-SCNC: 4.3 MMOL/L — SIGNIFICANT CHANGE UP (ref 3.5–5.3)
PROT SERPL-MCNC: 7.1 G/DL — SIGNIFICANT CHANGE UP (ref 6–8.3)
PROT UR-MCNC: NEGATIVE MG/DL — SIGNIFICANT CHANGE UP
PROTHROM AB SERPL-ACNC: 11.7 SEC — SIGNIFICANT CHANGE UP (ref 9.8–12.7)
RBC # BLD: 2.59 M/UL — LOW (ref 3.8–5.2)
RBC # FLD: 22.8 % — HIGH (ref 10.3–14.5)
RBC BLD AUTO: ABNORMAL
SCHISTOCYTES BLD QL AUTO: SLIGHT — SIGNIFICANT CHANGE UP
SICKLE CELLS BLD QL SMEAR: SIGNIFICANT CHANGE UP
SODIUM SERPL-SCNC: 140 MMOL/L — SIGNIFICANT CHANGE UP (ref 135–145)
SP GR SPEC: 1 — LOW (ref 1.01–1.02)
TARGETS BLD QL SMEAR: SLIGHT — SIGNIFICANT CHANGE UP
URATE SERPL-MCNC: 7.1 MG/DL — HIGH (ref 2.5–7)
UROBILINOGEN FLD QL: 1 MG/DL — SIGNIFICANT CHANGE UP
WBC # BLD: 12.8 K/UL — HIGH (ref 3.8–10.5)
WBC # FLD AUTO: 12.8 K/UL — HIGH (ref 3.8–10.5)

## 2018-05-26 PROCEDURE — 99232 SBSQ HOSP IP/OBS MODERATE 35: CPT

## 2018-05-26 RX ORDER — HYDROMORPHONE HYDROCHLORIDE 2 MG/ML
1 INJECTION INTRAMUSCULAR; INTRAVENOUS; SUBCUTANEOUS
Refills: 0 | Status: DISCONTINUED | OUTPATIENT
Start: 2018-05-26 | End: 2018-05-29

## 2018-05-26 RX ORDER — ACETAMINOPHEN 500 MG
975 TABLET ORAL ONCE
Refills: 0 | Status: COMPLETED | OUTPATIENT
Start: 2018-05-26 | End: 2018-05-26

## 2018-05-26 RX ORDER — HYDROMORPHONE HYDROCHLORIDE 2 MG/ML
8 INJECTION INTRAMUSCULAR; INTRAVENOUS; SUBCUTANEOUS
Refills: 0 | Status: DISCONTINUED | OUTPATIENT
Start: 2018-05-26 | End: 2018-05-29

## 2018-05-26 RX ADMIN — Medication 1 TABLET(S): at 12:25

## 2018-05-26 RX ADMIN — HYDROMORPHONE HYDROCHLORIDE 8 MILLIGRAM(S): 2 INJECTION INTRAMUSCULAR; INTRAVENOUS; SUBCUTANEOUS at 19:24

## 2018-05-26 RX ADMIN — URSODIOL 300 MILLIGRAM(S): 250 TABLET, FILM COATED ORAL at 14:07

## 2018-05-26 RX ADMIN — URSODIOL 300 MILLIGRAM(S): 250 TABLET, FILM COATED ORAL at 09:12

## 2018-05-26 RX ADMIN — Medication 0.8 MILLIGRAM(S): at 12:24

## 2018-05-26 RX ADMIN — HYDROMORPHONE HYDROCHLORIDE 8 MILLIGRAM(S): 2 INJECTION INTRAMUSCULAR; INTRAVENOUS; SUBCUTANEOUS at 18:32

## 2018-05-26 RX ADMIN — Medication 81 MILLIGRAM(S): at 12:24

## 2018-05-26 RX ADMIN — URSODIOL 300 MILLIGRAM(S): 250 TABLET, FILM COATED ORAL at 21:48

## 2018-05-26 RX ADMIN — Medication 975 MILLIGRAM(S): at 10:53

## 2018-05-26 NOTE — PROGRESS NOTE ADULT - SUBJECTIVE AND OBJECTIVE BOX
32 y/o woman SS disease who is currently 32 weeks pregnant admitted with VOC.    VITAL SIGNS:  T(F): 99 (18 @ 11:25)  HR: 74 (18 @ 11:25)  BP: 145/76 (18 @ 11:25)  RR: 18 (18 @ 11:25)  SpO2: 97% (18 @ 11:25)  Wt(kg): --    PHYSICAL EXAM:    Constitutional: NAD  Eyes: EOMI, sclera non-icteric  Neck: supple, no masses, no JVD  Respiratory: CTA b/l, good air entry b/l  Cardiovascular: RRR, no M/R/G  Gastrointestinal: soft, NTND, no masses palpable, + BS, no hepatosplenomegaly  Extremities: no c/c/e  Neurological: AAOx3      MEDICATIONS  (STANDING):  aspirin  chewable 81 milliGRAM(s) Oral daily  folic acid 0.8 milliGRAM(s) Oral daily  heparin  Injectable 5000 Unit(s) SubCutaneous every 12 hours  prenatal multivitamin 1 Tablet(s) Oral daily  sodium chloride 0.45%. 1000 milliLiter(s) (200 mL/Hr) IV Continuous <Continuous>  sodium chloride 0.9%. 1000 milliLiter(s) (100 mL/Hr) IV Continuous <Continuous>  ursodiol Capsule 300 milliGRAM(s) Oral every 8 hours    MEDICATIONS  (PRN):  HYDROmorphone   Tablet 8 milliGRAM(s) Oral every 3 hours PRN moderate to severe pain  HYDROmorphone  Injectable 1 milliGRAM(s) IV Push every 2 hours PRN severe pain not managed by PO dilaudid      Allergies    No Known Allergies    Intolerances        LABS:                        8.9    12.8  )-----------( 491      ( 26 May 2018 01:48 )             24.1         140  |  108  |  8   ----------------------------<  105<H>  4.3   |  20<L>  |  0.64    Ca    9.2      26 May 2018 01:47    TPro  7.1  /  Alb  2.5<L>  /  TBili  3.9<H>  /  DBili  x   /  AST  121<H>  /  ALT  69<H>  /  AlkPhos  220<H>      PT/INR - ( 26 May 2018 01:48 )   PT: 11.7 sec;   INR: 1.08 ratio         PTT - ( 26 May 2018 01:48 )  PTT:25.8 sec  Urinalysis Basic - ( 26 May 2018 04:33 )    Color: Yellow / Appearance: Clear / S.005 / pH: x  Gluc: x / Ketone: Negative  / Bili: Negative / Urobili: 1.0 mg/dL   Blood: x / Protein: Negative mg/dL / Nitrite: Negative   Leuk Esterase: Negative / RBC: x / WBC x   Sq Epi: x / Non Sq Epi: x / Bacteria: x        RADIOLOGY & ADDITIONAL TESTS:  Studies reviewed.

## 2018-05-27 LAB
ALBUMIN SERPL ELPH-MCNC: 2.2 G/DL — LOW (ref 3.3–5)
ALBUMIN SERPL ELPH-MCNC: 2.3 G/DL — LOW (ref 3.3–5)
ALBUMIN SERPL ELPH-MCNC: 2.8 G/DL — LOW (ref 3.3–5)
ALP SERPL-CCNC: 197 U/L — HIGH (ref 40–120)
ALP SERPL-CCNC: 208 U/L — HIGH (ref 40–120)
ALP SERPL-CCNC: 247 U/L — HIGH (ref 40–120)
ALT FLD-CCNC: 51 U/L — HIGH (ref 10–45)
ALT FLD-CCNC: 59 U/L — HIGH (ref 10–45)
ALT FLD-CCNC: 69 U/L — HIGH (ref 10–45)
ANION GAP SERPL CALC-SCNC: 12 MMOL/L — SIGNIFICANT CHANGE UP (ref 5–17)
ANION GAP SERPL CALC-SCNC: 14 MMOL/L — SIGNIFICANT CHANGE UP (ref 5–17)
ANION GAP SERPL CALC-SCNC: 14 MMOL/L — SIGNIFICANT CHANGE UP (ref 5–17)
ANISOCYTOSIS BLD QL: SIGNIFICANT CHANGE UP
ANISOCYTOSIS BLD QL: SIGNIFICANT CHANGE UP
APTT BLD: 27.1 SEC — LOW (ref 27.5–37.4)
APTT BLD: 29.5 SEC — SIGNIFICANT CHANGE UP (ref 27.5–37.4)
AST SERPL-CCNC: 118 U/L — HIGH (ref 10–40)
AST SERPL-CCNC: 134 U/L — HIGH (ref 10–40)
AST SERPL-CCNC: 91 U/L — HIGH (ref 10–40)
BASOPHILS # BLD AUTO: 0 K/UL — SIGNIFICANT CHANGE UP (ref 0–0.2)
BASOPHILS # BLD AUTO: 0.1 K/UL — SIGNIFICANT CHANGE UP (ref 0–0.2)
BILIRUB SERPL-MCNC: 3.3 MG/DL — HIGH (ref 0.2–1.2)
BILIRUB SERPL-MCNC: 3.3 MG/DL — HIGH (ref 0.2–1.2)
BILIRUB SERPL-MCNC: 3.7 MG/DL — HIGH (ref 0.2–1.2)
BUN SERPL-MCNC: 6 MG/DL — LOW (ref 7–23)
BUN SERPL-MCNC: 7 MG/DL — SIGNIFICANT CHANGE UP (ref 7–23)
BUN SERPL-MCNC: 8 MG/DL — SIGNIFICANT CHANGE UP (ref 7–23)
CALCIUM SERPL-MCNC: 8.6 MG/DL — SIGNIFICANT CHANGE UP (ref 8.4–10.5)
CALCIUM SERPL-MCNC: 8.7 MG/DL — SIGNIFICANT CHANGE UP (ref 8.4–10.5)
CALCIUM SERPL-MCNC: 9 MG/DL — SIGNIFICANT CHANGE UP (ref 8.4–10.5)
CHLORIDE SERPL-SCNC: 104 MMOL/L — SIGNIFICANT CHANGE UP (ref 96–108)
CHLORIDE SERPL-SCNC: 107 MMOL/L — SIGNIFICANT CHANGE UP (ref 96–108)
CHLORIDE SERPL-SCNC: 108 MMOL/L — SIGNIFICANT CHANGE UP (ref 96–108)
CO2 SERPL-SCNC: 17 MMOL/L — LOW (ref 22–31)
CO2 SERPL-SCNC: 19 MMOL/L — LOW (ref 22–31)
CO2 SERPL-SCNC: 20 MMOL/L — LOW (ref 22–31)
CREAT SERPL-MCNC: 0.55 MG/DL — SIGNIFICANT CHANGE UP (ref 0.5–1.3)
CREAT SERPL-MCNC: 0.58 MG/DL — SIGNIFICANT CHANGE UP (ref 0.5–1.3)
CREAT SERPL-MCNC: 0.64 MG/DL — SIGNIFICANT CHANGE UP (ref 0.5–1.3)
DACRYOCYTES BLD QL SMEAR: SLIGHT — SIGNIFICANT CHANGE UP
DACRYOCYTES BLD QL SMEAR: SLIGHT — SIGNIFICANT CHANGE UP
ELLIPTOCYTES BLD QL SMEAR: SLIGHT — SIGNIFICANT CHANGE UP
EOSINOPHIL # BLD AUTO: 0.2 K/UL — SIGNIFICANT CHANGE UP (ref 0–0.5)
EOSINOPHIL # BLD AUTO: 0.2 K/UL — SIGNIFICANT CHANGE UP (ref 0–0.5)
EOSINOPHIL NFR BLD AUTO: 1 % — SIGNIFICANT CHANGE UP (ref 0–6)
EOSINOPHIL NFR BLD AUTO: 2 % — SIGNIFICANT CHANGE UP (ref 0–6)
FIBRINOGEN PPP-MCNC: 1038 MG/DL — HIGH (ref 310–510)
FIBRINOGEN PPP-MCNC: 948 MG/DL — HIGH (ref 310–510)
GIANT PLATELETS BLD QL SMEAR: PRESENT — SIGNIFICANT CHANGE UP
GLUCOSE SERPL-MCNC: 77 MG/DL — SIGNIFICANT CHANGE UP (ref 70–99)
GLUCOSE SERPL-MCNC: 93 MG/DL — SIGNIFICANT CHANGE UP (ref 70–99)
GLUCOSE SERPL-MCNC: 95 MG/DL — SIGNIFICANT CHANGE UP (ref 70–99)
HAPTOGLOB SERPL-MCNC: <20 MG/DL — LOW (ref 34–200)
HCT VFR BLD CALC: 21.7 % — LOW (ref 34.5–45)
HCT VFR BLD CALC: 22.5 % — LOW (ref 34.5–45)
HCT VFR BLD CALC: 25.8 % — LOW (ref 34.5–45)
HGB BLD-MCNC: 7.8 G/DL — LOW (ref 11.5–15.5)
HGB BLD-MCNC: 8.2 G/DL — LOW (ref 11.5–15.5)
HGB BLD-MCNC: 9.2 G/DL — LOW (ref 11.5–15.5)
HOWELL-JOLLY BOD BLD QL SMEAR: PRESENT — SIGNIFICANT CHANGE UP
HYPOCHROMIA BLD QL: SLIGHT — SIGNIFICANT CHANGE UP
INR BLD: 0.99 RATIO — SIGNIFICANT CHANGE UP (ref 0.88–1.16)
INR BLD: 1.07 RATIO — SIGNIFICANT CHANGE UP (ref 0.88–1.16)
LDH SERPL L TO P-CCNC: 654 U/L — HIGH (ref 50–242)
LDH SERPL L TO P-CCNC: 722 U/L — HIGH (ref 50–242)
LDH SERPL L TO P-CCNC: 781 U/L — HIGH (ref 50–242)
LG PLATELETS BLD QL AUTO: SLIGHT — SIGNIFICANT CHANGE UP
LYMPHOCYTES # BLD AUTO: 1.4 K/UL — SIGNIFICANT CHANGE UP (ref 1–3.3)
LYMPHOCYTES # BLD AUTO: 1.6 K/UL — SIGNIFICANT CHANGE UP (ref 1–3.3)
LYMPHOCYTES # BLD AUTO: 20 % — SIGNIFICANT CHANGE UP (ref 13–44)
LYMPHOCYTES # BLD AUTO: 27 % — SIGNIFICANT CHANGE UP (ref 13–44)
MACROCYTES BLD QL: SIGNIFICANT CHANGE UP
MACROCYTES BLD QL: SIGNIFICANT CHANGE UP
MAGNESIUM SERPL-MCNC: 5.3 MG/DL — HIGH (ref 1.6–2.6)
MCHC RBC-ENTMCNC: 32.9 PG — SIGNIFICANT CHANGE UP (ref 27–34)
MCHC RBC-ENTMCNC: 33.6 PG — SIGNIFICANT CHANGE UP (ref 27–34)
MCHC RBC-ENTMCNC: 33.9 PG — SIGNIFICANT CHANGE UP (ref 27–34)
MCHC RBC-ENTMCNC: 35.6 GM/DL — SIGNIFICANT CHANGE UP (ref 32–36)
MCHC RBC-ENTMCNC: 35.9 GM/DL — SIGNIFICANT CHANGE UP (ref 32–36)
MCHC RBC-ENTMCNC: 36.5 GM/DL — HIGH (ref 32–36)
MCV RBC AUTO: 92.4 FL — SIGNIFICANT CHANGE UP (ref 80–100)
MCV RBC AUTO: 92.8 FL — SIGNIFICANT CHANGE UP (ref 80–100)
MCV RBC AUTO: 93.7 FL — SIGNIFICANT CHANGE UP (ref 80–100)
METAMYELOCYTES # FLD: 1 % — HIGH (ref 0–0)
MICROCYTES BLD QL: SLIGHT — SIGNIFICANT CHANGE UP
MONOCYTES # BLD AUTO: 0.8 K/UL — SIGNIFICANT CHANGE UP (ref 0–0.9)
MONOCYTES # BLD AUTO: 0.9 K/UL — SIGNIFICANT CHANGE UP (ref 0–0.9)
MONOCYTES NFR BLD AUTO: 11 % — SIGNIFICANT CHANGE UP (ref 2–14)
MONOCYTES NFR BLD AUTO: 9 % — SIGNIFICANT CHANGE UP (ref 2–14)
NEUTROPHILS # BLD AUTO: 6 K/UL — SIGNIFICANT CHANGE UP (ref 1.8–7.4)
NEUTROPHILS # BLD AUTO: 7.3 K/UL — SIGNIFICANT CHANGE UP (ref 1.8–7.4)
NEUTROPHILS NFR BLD AUTO: 60 % — SIGNIFICANT CHANGE UP (ref 43–77)
NEUTROPHILS NFR BLD AUTO: 69 % — SIGNIFICANT CHANGE UP (ref 43–77)
NRBC # BLD: 82 /100 — HIGH (ref 0–0)
NRBC # BLD: 95 /100 — HIGH (ref 0–0)
OVALOCYTES BLD QL SMEAR: SLIGHT — SIGNIFICANT CHANGE UP
PLAT MORPH BLD: ABNORMAL
PLAT MORPH BLD: NORMAL — SIGNIFICANT CHANGE UP
PLATELET # BLD AUTO: 438 K/UL — HIGH (ref 150–400)
PLATELET # BLD AUTO: 465 K/UL — HIGH (ref 150–400)
PLATELET # BLD AUTO: 487 K/UL — HIGH (ref 150–400)
POIKILOCYTOSIS BLD QL AUTO: SIGNIFICANT CHANGE UP
POIKILOCYTOSIS BLD QL AUTO: SIGNIFICANT CHANGE UP
POLYCHROMASIA BLD QL SMEAR: SLIGHT — SIGNIFICANT CHANGE UP
POTASSIUM SERPL-MCNC: 3.5 MMOL/L — SIGNIFICANT CHANGE UP (ref 3.5–5.3)
POTASSIUM SERPL-MCNC: 3.6 MMOL/L — SIGNIFICANT CHANGE UP (ref 3.5–5.3)
POTASSIUM SERPL-MCNC: 3.7 MMOL/L — SIGNIFICANT CHANGE UP (ref 3.5–5.3)
POTASSIUM SERPL-SCNC: 3.5 MMOL/L — SIGNIFICANT CHANGE UP (ref 3.5–5.3)
POTASSIUM SERPL-SCNC: 3.6 MMOL/L — SIGNIFICANT CHANGE UP (ref 3.5–5.3)
POTASSIUM SERPL-SCNC: 3.7 MMOL/L — SIGNIFICANT CHANGE UP (ref 3.5–5.3)
PROT SERPL-MCNC: 6.2 G/DL — SIGNIFICANT CHANGE UP (ref 6–8.3)
PROT SERPL-MCNC: 6.4 G/DL — SIGNIFICANT CHANGE UP (ref 6–8.3)
PROT SERPL-MCNC: 7.4 G/DL — SIGNIFICANT CHANGE UP (ref 6–8.3)
PROTHROM AB SERPL-ACNC: 10.7 SEC — SIGNIFICANT CHANGE UP (ref 9.8–12.7)
PROTHROM AB SERPL-ACNC: 11.6 SEC — SIGNIFICANT CHANGE UP (ref 9.8–12.7)
RBC # BLD: 2.31 M/UL — LOW (ref 3.8–5.2)
RBC # BLD: 2.31 M/UL — LOW (ref 3.8–5.2)
RBC # BLD: 2.42 M/UL — LOW (ref 3.8–5.2)
RBC # BLD: 2.79 M/UL — LOW (ref 3.8–5.2)
RBC # FLD: 22.5 % — HIGH (ref 10.3–14.5)
RBC # FLD: 22.6 % — HIGH (ref 10.3–14.5)
RBC # FLD: 22.8 % — HIGH (ref 10.3–14.5)
RBC BLD AUTO: ABNORMAL
RBC BLD AUTO: ABNORMAL
RETICS #: 287 K/UL — HIGH (ref 25–125)
RETICS/RBC NFR: 12.4 % — HIGH (ref 0.5–2.5)
SCHISTOCYTES BLD QL AUTO: SLIGHT — SIGNIFICANT CHANGE UP
SICKLE CELLS BLD QL SMEAR: SIGNIFICANT CHANGE UP
SICKLE CELLS BLD QL SMEAR: SIGNIFICANT CHANGE UP
SODIUM SERPL-SCNC: 138 MMOL/L — SIGNIFICANT CHANGE UP (ref 135–145)
SODIUM SERPL-SCNC: 138 MMOL/L — SIGNIFICANT CHANGE UP (ref 135–145)
SODIUM SERPL-SCNC: 139 MMOL/L — SIGNIFICANT CHANGE UP (ref 135–145)
TARGETS BLD QL SMEAR: SIGNIFICANT CHANGE UP
TARGETS BLD QL SMEAR: SLIGHT — SIGNIFICANT CHANGE UP
URATE SERPL-MCNC: 7.2 MG/DL — HIGH (ref 2.5–7)
URATE SERPL-MCNC: 7.2 MG/DL — HIGH (ref 2.5–7)
WBC # BLD: 10.1 K/UL — SIGNIFICANT CHANGE UP (ref 3.8–10.5)
WBC # BLD: 15.4 K/UL — HIGH (ref 3.8–10.5)
WBC # BLD: 8.5 K/UL — SIGNIFICANT CHANGE UP (ref 3.8–10.5)
WBC # FLD AUTO: 10.1 K/UL — SIGNIFICANT CHANGE UP (ref 3.8–10.5)
WBC # FLD AUTO: 15.4 K/UL — HIGH (ref 3.8–10.5)
WBC # FLD AUTO: 8.5 K/UL — SIGNIFICANT CHANGE UP (ref 3.8–10.5)

## 2018-05-27 PROCEDURE — 99232 SBSQ HOSP IP/OBS MODERATE 35: CPT

## 2018-05-27 PROCEDURE — 71275 CT ANGIOGRAPHY CHEST: CPT | Mod: 26

## 2018-05-27 RX ORDER — AZITHROMYCIN 500 MG/1
500 TABLET, FILM COATED ORAL DAILY
Refills: 0 | Status: DISCONTINUED | OUTPATIENT
Start: 2018-05-27 | End: 2018-05-27

## 2018-05-27 RX ORDER — MAGNESIUM SULFATE 500 MG/ML
2 VIAL (ML) INJECTION
Qty: 40 | Refills: 0 | Status: DISCONTINUED | OUTPATIENT
Start: 2018-05-27 | End: 2018-05-28

## 2018-05-27 RX ORDER — MAGNESIUM SULFATE 500 MG/ML
4 VIAL (ML) INJECTION ONCE
Refills: 0 | Status: COMPLETED | OUTPATIENT
Start: 2018-05-27 | End: 2018-05-27

## 2018-05-27 RX ORDER — AZITHROMYCIN 500 MG/1
TABLET, FILM COATED ORAL
Refills: 0 | Status: DISCONTINUED | OUTPATIENT
Start: 2018-05-27 | End: 2018-05-28

## 2018-05-27 RX ORDER — SODIUM CHLORIDE 0.65 %
1 AEROSOL, SPRAY (ML) NASAL
Refills: 0 | Status: DISCONTINUED | OUTPATIENT
Start: 2018-05-27 | End: 2018-05-31

## 2018-05-27 RX ORDER — CEFTRIAXONE 500 MG/1
1 INJECTION, POWDER, FOR SOLUTION INTRAMUSCULAR; INTRAVENOUS EVERY 24 HOURS
Refills: 0 | Status: DISCONTINUED | OUTPATIENT
Start: 2018-05-28 | End: 2018-05-30

## 2018-05-27 RX ORDER — AZITHROMYCIN 500 MG/1
500 TABLET, FILM COATED ORAL ONCE
Refills: 0 | Status: COMPLETED | OUTPATIENT
Start: 2018-05-27 | End: 2018-05-27

## 2018-05-27 RX ORDER — HYDRALAZINE HCL 50 MG
10 TABLET ORAL ONCE
Refills: 0 | Status: COMPLETED | OUTPATIENT
Start: 2018-05-27 | End: 2018-05-27

## 2018-05-27 RX ORDER — CEFTRIAXONE 500 MG/1
1 INJECTION, POWDER, FOR SOLUTION INTRAMUSCULAR; INTRAVENOUS ONCE
Refills: 0 | Status: COMPLETED | OUTPATIENT
Start: 2018-05-27 | End: 2018-05-27

## 2018-05-27 RX ORDER — SODIUM CHLORIDE 9 MG/ML
1000 INJECTION, SOLUTION INTRAVENOUS
Refills: 0 | Status: DISCONTINUED | OUTPATIENT
Start: 2018-05-27 | End: 2018-05-30

## 2018-05-27 RX ORDER — AZITHROMYCIN 500 MG/1
500 TABLET, FILM COATED ORAL EVERY 24 HOURS
Refills: 0 | Status: DISCONTINUED | OUTPATIENT
Start: 2018-05-28 | End: 2018-05-28

## 2018-05-27 RX ORDER — CEFTRIAXONE 500 MG/1
INJECTION, POWDER, FOR SOLUTION INTRAMUSCULAR; INTRAVENOUS
Refills: 0 | Status: DISCONTINUED | OUTPATIENT
Start: 2018-05-27 | End: 2018-05-30

## 2018-05-27 RX ADMIN — Medication 300 GRAM(S): at 18:00

## 2018-05-27 RX ADMIN — Medication 10 MILLIGRAM(S): at 17:13

## 2018-05-27 RX ADMIN — URSODIOL 300 MILLIGRAM(S): 250 TABLET, FILM COATED ORAL at 14:35

## 2018-05-27 RX ADMIN — Medication 50 GM/HR: at 18:20

## 2018-05-27 RX ADMIN — SODIUM CHLORIDE 50 MILLILITER(S): 9 INJECTION, SOLUTION INTRAVENOUS at 18:00

## 2018-05-27 RX ADMIN — CEFTRIAXONE 100 GRAM(S): 500 INJECTION, POWDER, FOR SOLUTION INTRAMUSCULAR; INTRAVENOUS at 19:39

## 2018-05-27 RX ADMIN — AZITHROMYCIN 250 MILLIGRAM(S): 500 TABLET, FILM COATED ORAL at 21:14

## 2018-05-27 RX ADMIN — Medication 0.8 MILLIGRAM(S): at 16:40

## 2018-05-27 RX ADMIN — URSODIOL 300 MILLIGRAM(S): 250 TABLET, FILM COATED ORAL at 22:58

## 2018-05-27 RX ADMIN — URSODIOL 300 MILLIGRAM(S): 250 TABLET, FILM COATED ORAL at 06:01

## 2018-05-27 RX ADMIN — Medication 1 TABLET(S): at 16:40

## 2018-05-27 RX ADMIN — Medication 81 MILLIGRAM(S): at 14:35

## 2018-05-27 RX ADMIN — HYDROMORPHONE HYDROCHLORIDE 8 MILLIGRAM(S): 2 INJECTION INTRAMUSCULAR; INTRAVENOUS; SUBCUTANEOUS at 06:01

## 2018-05-27 NOTE — PROGRESS NOTE ADULT - SUBJECTIVE AND OBJECTIVE BOX
34 y/o woman SS disease who is currently 32 weeks pregnant admitted with VOC.    INTERVAL HPI/OVERNIGHT EVENTS:  Patient S&E at bedside. No o/n events,     VITAL SIGNS:  T(F): 97.9 (18 @ 12:44)  HR: 70 (18 @ 12:44)  BP: 142/88 (18 @ 12:44)  RR: 18 (18 @ 12:44)  SpO2: 96% (18 @ 12:44)  Wt(kg): --    PHYSICAL EXAM:    Constitutional: NAD  Eyes: EOMI, sclera non-icteric  Neck: supple, no masses, no JVD  Respiratory: CTA b/l, good air entry b/l  Cardiovascular: RRR, no M/R/G  Gastrointestinal: soft, NTND, no masses palpable, + BS, no hepatosplenomegaly  Extremities: no c/c/e  Neurological: AAOx3      MEDICATIONS  (STANDING):  aspirin  chewable 81 milliGRAM(s) Oral daily  folic acid 0.8 milliGRAM(s) Oral daily  heparin  Injectable 5000 Unit(s) SubCutaneous every 12 hours  prenatal multivitamin 1 Tablet(s) Oral daily  sodium chloride 0.45%. 1000 milliLiter(s) (200 mL/Hr) IV Continuous <Continuous>  sodium chloride 0.9%. 1000 milliLiter(s) (100 mL/Hr) IV Continuous <Continuous>  ursodiol Capsule 300 milliGRAM(s) Oral every 8 hours    MEDICATIONS  (PRN):  HYDROmorphone   Tablet 8 milliGRAM(s) Oral every 3 hours PRN moderate to severe pain  HYDROmorphone  Injectable 1 milliGRAM(s) IV Push every 2 hours PRN severe pain not managed by PO dilaudid      Allergies    No Known Allergies    Intolerances        LABS:                        7.8    15.4  )-----------( 438      ( 27 May 2018 06:52 )             21.7         139  |  108  |  8   ----------------------------<  77  3.6   |  19<L>  |  0.64    Ca    8.6      27 May 2018 06:52    TPro  6.2  /  Alb  2.2<L>  /  TBili  3.3<H>  /  DBili  x   /  AST  91<H>  /  ALT  51<H>  /  AlkPhos  197<H>  05-27    PT/INR - ( 26 May 2018 01:48 )   PT: 11.7 sec;   INR: 1.08 ratio         PTT - ( 26 May 2018 01:48 )  PTT:25.8 sec  Urinalysis Basic - ( 26 May 2018 04:33 )    Color: Yellow / Appearance: Clear / S.005 / pH: x  Gluc: x / Ketone: Negative  / Bili: Negative / Urobili: 1.0 mg/dL   Blood: x / Protein: Negative mg/dL / Nitrite: Negative   Leuk Esterase: Negative / RBC: x / WBC x   Sq Epi: x / Non Sq Epi: x / Bacteria: x        RADIOLOGY & ADDITIONAL TESTS:  Studies reviewed.

## 2018-05-27 NOTE — CONSULT NOTE ADULT - ATTENDING COMMENTS
Patient with mild desaturation, chest pain. Would treat as above, if no improvement, consider exchange transfusion.
33 F who is currently 32 weeks pregnant admitted for VOC.  She has no evidence of ACS though with small crackles at left base, CXR is clear and she is not hypoxic with no chest pain.  IV morphine for pain control, currently on 4 mg, with 4 mg breakthrough.   IV hydration with hypotonic fluids, folic acid.  Check abdominal sonogram with her elevated LFT's.  Check RVP.    Hold on transfusion, will reevaluate tomorrow.

## 2018-05-27 NOTE — CHART NOTE - NSCHARTNOTEFT_GEN_A_CORE
MFM attending note            Patient seen and examined earlier in the day with team. Discussed interval events with team, pt now on L&D for BP control, magnesium for seizure ppx and to start IOL.        This is a 32y/o P1 at 33w1d with sickle cell disease transferred for management of vaso-occulsive crisis. History also notable for cholestasis of pregnancy and a history of severe preeclampsia requiring delivery at 34 wks in her last pregnancy ().            In terms of the cholestasis she has a history of the disease in her past pregnancy, and was diagnosed early with IHCP in this pregnancy (2nd trimester) with bile acid elevations and mild LFT elevations. She remained symptomatic with pruritus during the pregnancy but some improvement noted on ursodiol (300mg TID). Bile acids during this admission 13, which is decreased from prior measurements. LFT elevations noted on admission and thought to be secondary to IHCP given no other lab abnormalities suggestive of HELLP. Since admission AST has improved from a high of 183 to 91 today, and ALT 95 to 51. Improvement may be secondary to recent administration of betamethasone.            Since admission Ms. Becerra has had occasional mild BP elevations, but in the setting of a vaso-occlusive pain crisis a diagnosis of hypertensive d/o of pregnancy was clouded. However, since admission BPs have steadily increased and this evening developed severe range BPs that was treated with hydralazine and the patient was transferred to L&D for further management. Pain control and pain symptoms have improved markedly while BPs continued to rise. Baseline 24hr urine protein without significant proteinuria, and repeat collection this admission showing 280mg. No other lab abnormalities suggestive of HELLP noted.            In terms of the vaso-occlusive pain episode, pain symptoms are still present but have markedly improved (last dose of dilaudid 8mg PO this am). However, she continued to have back discomfort, abnormal breath sounds at the left bases and desaturation episodes as low as 85, more recently 93, off supplemental oxygen. Initial CXR unremarkable. However, given persistent PE findings and low oxygen saturation CTA performed today. CT neg for PE but consolidation noted in left base (PNA v. findings c/w acute chest). Pulmonary has seen the patient and is recommending tx for possible PNA, and supplemental oxygen for comfort.        Fetal monitoring has been reassuring throughout this admission, and remains so. Recent EFW, 18, 1869g (40th%). Betamethasone was given -.            Labs:  WBC 15.4      HCT 21.7 (24.1 initially after transfusion)      Creatinine 0.64      AST 91 ALT 51      Bile aids 13      GBS RV swab negative (18)            Meds:  Ceftriaxone, azithromycin, magnesium sulfate, Dilaudid PO prn (IV for breakthrough pain), ursodiol, ASA, heparin (pt has been refusing)            Impression: 32y/o P1 at 33+wks with sickle cell disease currently with a vaso-occlusive episode, possible PNA versus lung consolidation consistent with acute chest, cholestasis of pregnancy, and gestational hypertension with severe features (severe HTN).            Recommendations:         - Concerned preeclampsia will lead to worsening of hematologic dysfunction (hemolysis, endothelial dysfunction and hemoconcentation) in the setting of a vaso-occlusive pain episode. Given 33+ weeks and steroid complete with severe features of hypertensive d/o of pregnancy, recommend delivery as negligible fetal benefit in prolonging gestation to 34 weeks, and delivery will treat underlying hypertensive d/o and hematologic dysfunction due to hypertensive d/o.    - IOL appropriate with  reserved for the usual obstetric indications.        - Opioids appropriate for pain control prn (notify peds if systemic opioids needed intrapartum). Recommend avoiding Tylenol given underlying liver dysfunction. Epidural upon request.      - Continue treatment for PNA, and vaso-occlusive episode, will follow pulmonary exam. Supplemental oxygen prn.      - Repeat CBC, if HCT 20-22 may be reasonable to transfuse 1-2 units pRBC prophylactically given typical blood loss with delivery. As pt is at increased risk for PPH (magnesium) would keep active type and cross for 4 units pRBC      - Continue magnesium sulfate for seizure ppx and follow pulmonary exam closely as pt is a risk for pulmonary edema      - BP control as per guidelines        MIKIE Watkins MD    [phone]

## 2018-05-27 NOTE — CONSULT NOTE ADULT - ASSESSMENT
33 year old female with PMH SCD, 33 weeks gestation, who was transferred from [facility] for concern for vaso-occlusive crisis, now with pre-eclampsia and hypoxic respiratory failure.    1. Hypoxic Respiratory Failure-  -Etiology appears multifactorial at this time: possibly related to infectious cause given consolidation present at the left base and an increase in WBC vs a component of atelectasis from fetal burden/poor inspiratory effort from VOC vs. possible volume overload given GGO changes that are noted  -CTPA was negative for PE  -At bedside, patient was placed on RA and desaturated to 93%--placed back on 2L NC for comfort  -Would start Ceftriaxone 1g IV QD and Azithromycin 500mg IV QD for suspected PNA  -Cont incentive spirometry at bedside  -Pain control with dilauded as per primary team  -Supplemental oxygen as needed    2. Pre-eclampsia-  -Management as per primary team  -Cont therapy, monitor for pulmonary edema, treat as indicated    Tee Myers DO  Pulmonary and Critical Care Fellow

## 2018-05-27 NOTE — CONSULT NOTE ADULT - SUBJECTIVE AND OBJECTIVE BOX
See CPN for additional obstetric information (EMR used in OB)    Patient seen and examined with the resident, agree with and appreciate admission H&P    This is a 32 y/o  at 32w4d ([id number]) transferred from [facility] with suspected vaso-occlusive pain episode that started with fatigue and diffuse bone pain (arms, legs, pelvis and HA). She denies chest pain, and has never had acute chest syndrome. She was last hospitalized in Dec 2017 for a vaso-occlusive pain episode and received a blood transfusion at that time. She reports 2 hospitalizations over the past year which is more frequent than usual. She reports a h/o hospitalizations and blood transfusions every 2-3 years.     Prenatal history significant for a history of preeclampsia with severe features necessitating delivery at 34 weeks (). She also reports a history of cholestasis of pregnancy in her last pregnancy and this pregnancy. In this pregnancy cholestasis of pregnancy was diagnosed in the 2nd trimester and she was started on ursodiol. She continues to take ursodiol 300mg TID. Otherwise she takes PNV, ASA and folic acid. Despite the ursodiol she continues to experience diffuse pruritus.  She reports serial growth ultrasounds in this pregnancy have demonstrated good fetal growth (records not available).    Her current pain episode has been treated with IV fluid hydration and IV opioids prn (last dose last night). Since admission her pain has markedly improved "1000"/10 --> 2/10 currently. She denies CP or SOB.    At [facility] recived 2nd dose of betamethasone and Morphin 10mg IM.     PE: Afebrile, BPs normotensive to mild range HTN, HR 70s, RR 18, Sat 91-93% on RA, % with supplemental oxygen  Gen: NAD, appears comfortable lying in bed  CV: RR  Pulm: Crackles at left base, otherwise CTA b/l  Abd: Soft, gravid, NT  Ext: No edema, symmetric UE and LE b/l     Records Reviewed: Basline Hgb 6 to 8  Baseline 24 hour protein (2/15/18): 190mg/day  Bile acids elevated with mild AST/ALT elevations this pregnancy  Type and screen Rh+ and antibody screen negative    Labs here: H/H 7.2/20.1, platelets 492, creatinine 0.6, nl coags and fibrinogen, , ALT 95, bilirubin elevated    Fetal monitoring reassuring.  No regular contractions noted on toco    Impression:  34y/o  at 32+ weeks with vaso-occlusive pain episode and cholestasis of pregnancy. Elevated LFTs likely secondary to cholestasis of pregnancy. However, she is at high risk for preeclampsia given her history. Picture clouded by presence of pain on admission (multiple mild range BPs noted), and presence of of cholestasis of pregnancy leading to LFT elevations. Preeclampsia and HELLP remain on the differential dx. No other lab abnormalities consistent with HELLP noted.     Recommendations:  1. Continue IV fluid hydration  2. Hematology consult, consider transfusion. Transfusion may help with crisis symptoms and would benefit from higher HCT in preparation for delivery given typical EBL with delivery.  3. Follow BPs and complete 24 hour urine protein, and send spot urine protein/creatinine ratio  4. Status post betamethasone in the event that delivery becomes indicated (-)  5. Pain control with opioids as needed, though pain appears much improved  6. CXR for crackles and low oxygen sat on RA  7. Heparin 5000 units BID for DVT ppx  8. Fetal monitoring reassuring, continue once daily NSTs unless change in clinical status  9. Official sono for BPP and growth  10. Send bile acids and trend daily CBC and CMP
CHIEF COMPLAINT: VOC    HPI:  33 year old female with PMH SCD, 33 weeks gestation, who was transferred from [facility] for concern for vaso-occlusive crisis. She reports symptoms started with chest discomfort and fatigue and progressed to generalized pain. She reports 4 crises during this pregnancy. She reports transfusion during her first crisis in December for which she was hospitalized.     She has a prior history of pre-eclampsia in prior pregnancy necessitation early delivery at 34 weeks.    She was noted to have a desaturation event when placed on room air to 85% requiring being placed on supplemental oxygen at 2L.    She had a CTPA that was negative for PE but show a consolidation at the left base.    Denies cough or shortness of breath.  No fevers or sick contacts.  No history of ACS      PAST MEDICAL & SURGICAL HISTORY:  SCD    FAMILY HISTORY:      SOCIAL HISTORY:  Smoking: denies  EtOH Use: denies    Allergies    No Known Allergies    Intolerances    HOME MEDICATIONS:  NSAIDS    REVIEW OF SYSTEMS:  Constitutional:   Eyes:  ENT:  CV:  Resp:  GI:  :  MSK: joint aches  Integumentary:  Neurological:  Psychiatric:  Endocrine:  Hematologic/Lymphatic:  Allergic/Immunologic:  [x] All other systems negative  [ ] Unable to assess ROS because ________    OBJECTIVE:  ICU Vital Signs Last 24 Hrs  T(C): 36.7 (27 May 2018 16:30), Max: 37.1 (27 May 2018 01:18)  T(F): 98.1 (27 May 2018 16:30), Max: 98.7 (27 May 2018 01:18)  HR: 73 (27 May 2018 17:23) (52 - 78)  BP: 148/83 (27 May 2018 17:23) (138/73 - 174/88)  BP(mean): --  ABP: --  ABP(mean): --  RR: 18 (27 May 2018 16:30) (18 - 18)  SpO2: 99% (27 May 2018 16:30) (85% - 99%)         @ 07:01  -   @ 07:00  --------------------------------------------------------  IN: 2473 mL / OUT: 3350 mL / NET: -877 mL     @ 07:01  -   @ 18:32  --------------------------------------------------------  IN: 800 mL / OUT: 1200 mL / NET: -400 mL      CAPILLARY BLOOD GLUCOSE          PHYSICAL EXAM:  General:  AAOx3  HEENT: EOMI, PERRL  Lymph Nodes: No LAD  Neck: JVP 4-6cm  Respiratory: bibasilar crackles  Cardiovascular: RRR, no m/r/g  Abdomen: soft, NT/ND, no HSM  Extremities: no c/c/e  Skin: nl. skin turgor  Neurological: no deficitis      HOSPITAL MEDICATIONS:  MEDICATIONS  (STANDING):  aspirin  chewable 81 milliGRAM(s) Oral daily  folic acid 0.8 milliGRAM(s) Oral daily  heparin  Injectable 5000 Unit(s) SubCutaneous every 12 hours  magnesium sulfate Infusion 2 Gm/Hr (50 mL/Hr) IV Continuous <Continuous>  prenatal multivitamin 1 Tablet(s) Oral daily  sodium chloride 0.45%. 1000 milliLiter(s) (200 mL/Hr) IV Continuous <Continuous>  sodium chloride 0.9%. 1000 milliLiter(s) (100 mL/Hr) IV Continuous <Continuous>  ursodiol Capsule 300 milliGRAM(s) Oral every 8 hours    MEDICATIONS  (PRN):  HYDROmorphone   Tablet 8 milliGRAM(s) Oral every 3 hours PRN moderate to severe pain  HYDROmorphone  Injectable 1 milliGRAM(s) IV Push every 2 hours PRN severe pain not managed by PO dilaudid      LABS:                        7.8    15.4  )-----------( 438      ( 27 May 2018 06:52 )             21.7         139  |  108  |  8   ----------------------------<  77  3.6   |  19<L>  |  0.64    Ca    8.6      27 May 2018 06:52    TPro  6.2  /  Alb  2.2<L>  /  TBili  3.3<H>  /  DBili  x   /  AST  91<H>  /  ALT  51<H>  /  AlkPhos  197<H>      PT/INR - ( 26 May 2018 01:48 )   PT: 11.7 sec;   INR: 1.08 ratio         PTT - ( 26 May 2018 01:48 )  PTT:25.8 sec  Urinalysis Basic - ( 26 May 2018 04:33 )    Color: Yellow / Appearance: Clear / S.005 / pH: x  Gluc: x / Ketone: Negative  / Bili: Negative / Urobili: 1.0 mg/dL   Blood: x / Protein: Negative mg/dL / Nitrite: Negative   Leuk Esterase: Negative / RBC: x / WBC x   Sq Epi: x / Non Sq Epi: x / Bacteria: x      RADIOLOGY:  [x] Reviewed and interpreted by me
HPI:    Patient is a 33 year old female with PMH Sickle Cell disease, 32 weeks gestation, who was transferred from [facility] for concern for vaso-occlusive crisis. She reports symptoms started with chest discomfort and fatigue and progressed to generalized pain. She reports 4 crises during this pregnancy. She reports transfusion during her first crisis in December for which she was hospitalized. She has a prior history of pre-eclampsia in prior pregnancy necessitation early delivery at 34 weeks and is on aspirin. She developed cholestasis during this pregnancy and is on ursodiol. She reports her pregnancy has otherwise been uncomplicated but the "baby is a little small" and the "fluid is low".  No prior history of acute chest. She has no full-blood relatives, only half siblings. Has never discussed transplant with anyone.     Currently she reports SOB, generalized pain, and fatigue. She denies fevers. + Cough.    Allergies    No Known Allergies    Intolerances        MEDICATIONS  (STANDING):  aspirin  chewable 81 milliGRAM(s) Oral daily  folic acid 0.8 milliGRAM(s) Oral daily  heparin  Injectable 5000 Unit(s) SubCutaneous every 12 hours  morphine  - Injectable 4 milliGRAM(s) IV Push every 4 hours  prenatal multivitamin 1 Tablet(s) Oral daily  sodium chloride 0.45%. 1000 milliLiter(s) (200 mL/Hr) IV Continuous <Continuous>  ursodiol Capsule 300 milliGRAM(s) Oral every 8 hours    MEDICATIONS  (PRN):  morphine  - Injectable 4 milliGRAM(s) IV Push every 4 hours PRN Severe Pain (7 - 10)      PAST MEDICAL & SURGICAL HISTORY:      FAMILY HISTORY:      SOCIAL HISTORY: No EtOH, no tobacco    REVIEW OF SYSTEMS:    CONSTITUTIONAL: No  fevers or chills  EYES/ENT: No visual changes;  No vertigo or throat pain   NECK: No pain or stiffness  RESPIRATORY: No cough, wheezing, hemoptysis; No shortness of breath  CARDIOVASCULAR: No chest pain or palpitations  GASTROINTESTINAL: No abdominal or epigastric pain. No nausea, vomiting, or hematemesis; No diarrhea or constipation. No melena or hematochezia.  GENITOURINARY: No dysuria, frequency or hematuria  NEUROLOGICAL: No numbness or weakness  SKIN: No itching, burning, rashes, or lesions   All other review of systems is negative unless indicated above.    Height (cm): 154.94 (05-24 @ 15:20)  Weight (kg): 56.7 (05-24 @ 15:20)  BMI (kg/m2): 23.6 (05-24 @ 15:20)  BSA (m2): 1.55 (05-24 @ 15:20)    T(F): 97.7 (05-24-18 @ 15:20), Max: 97.7 (05-24-18 @ 15:20)  HR: 68 (05-24-18 @ 16:15)  BP: 124/78 (05-24-18 @ 16:15)  RR: 18 (05-24-18 @ 16:15)  SpO2: 97% (05-24-18 @ 16:15)  Wt(kg): --    GENERAL: NAD, well-developed, coughing, tired-appearing  HEAD:  Atraumatic, Normocephalic  EYES: EOMI, PERRLA, conjunctiva and sclera clear  NECK: Supple, No JVD  CHEST/LUNG: left basilar crackles  HEART: Regular rate and rhythm; No murmurs, rubs, or gallops  ABDOMEN: Soft, Nontender, Nondistended; Bowel sounds present  EXTREMITIES:  2+ Peripheral Pulses, No clubbing, cyanosis, or edema  NEUROLOGY: non-focal  SKIN: No rashes or lesions                          7.2    18.3  )-----------( 492      ( 24 May 2018 02:11 )             20.1       05-24    141  |  107  |  9   ----------------------------<  123<H>  4.6   |  20<L>  |  0.60    Ca    9.1      24 May 2018 02:11    TPro  6.9  /  Alb  2.7<L>  /  TBili  6.7<H>  /  DBili  x   /  AST  183<H>  /  ALT  95<H>  /  AlkPhos  275<H>  05-24      Uric Acid, Serum: 6.6 mg/dL (05-24 @ 02:11)  Lactate Dehydrogenase, Serum: 714 U/L (05-24 @ 02:11)  Lactate Dehydrogenase, Serum: 502 U/L (05-24 @ 01:25)

## 2018-05-27 NOTE — PROGRESS NOTE ADULT - ASSESSMENT
34 y/o woman SS disease who is currently 32 weeks pregnant admitted with VOC. Patient denes any pain.    Plan: VOC crisis: continue to monitor closely CBC and electrolyttes. Incentive spyrometer at bedside: use it for pulmonary exercise Q 30 min.

## 2018-05-28 LAB
ALBUMIN SERPL ELPH-MCNC: 2.5 G/DL — LOW (ref 3.3–5)
ALBUMIN SERPL ELPH-MCNC: 2.8 G/DL — LOW (ref 3.3–5)
ALP SERPL-CCNC: 232 U/L — HIGH (ref 40–120)
ALP SERPL-CCNC: 261 U/L — HIGH (ref 40–120)
ALT FLD-CCNC: 67 U/L — HIGH (ref 10–45)
ALT FLD-CCNC: 78 U/L — HIGH (ref 10–45)
ANION GAP SERPL CALC-SCNC: 14 MMOL/L — SIGNIFICANT CHANGE UP (ref 5–17)
ANION GAP SERPL CALC-SCNC: 14 MMOL/L — SIGNIFICANT CHANGE UP (ref 5–17)
APTT BLD: 25.9 SEC — LOW (ref 27.5–37.4)
APTT BLD: 26.6 SEC — LOW (ref 27.5–37.4)
APTT BLD: 28.5 SEC — SIGNIFICANT CHANGE UP (ref 27.5–37.4)
AST SERPL-CCNC: 139 U/L — HIGH (ref 10–40)
AST SERPL-CCNC: 165 U/L — HIGH (ref 10–40)
BASOPHILS # BLD AUTO: 0 K/UL — SIGNIFICANT CHANGE UP (ref 0–0.2)
BILIRUB SERPL-MCNC: 3.5 MG/DL — HIGH (ref 0.2–1.2)
BILIRUB SERPL-MCNC: 4.1 MG/DL — HIGH (ref 0.2–1.2)
BLD GP AB SCN SERPL QL: NEGATIVE — SIGNIFICANT CHANGE UP
BUN SERPL-MCNC: 5 MG/DL — LOW (ref 7–23)
BUN SERPL-MCNC: 6 MG/DL — LOW (ref 7–23)
CALCIUM SERPL-MCNC: 8.3 MG/DL — LOW (ref 8.4–10.5)
CALCIUM SERPL-MCNC: 8.6 MG/DL — SIGNIFICANT CHANGE UP (ref 8.4–10.5)
CHLORIDE SERPL-SCNC: 103 MMOL/L — SIGNIFICANT CHANGE UP (ref 96–108)
CHLORIDE SERPL-SCNC: 103 MMOL/L — SIGNIFICANT CHANGE UP (ref 96–108)
CO2 SERPL-SCNC: 19 MMOL/L — LOW (ref 22–31)
CO2 SERPL-SCNC: 21 MMOL/L — LOW (ref 22–31)
CREAT SERPL-MCNC: 0.6 MG/DL — SIGNIFICANT CHANGE UP (ref 0.5–1.3)
CREAT SERPL-MCNC: 0.7 MG/DL — SIGNIFICANT CHANGE UP (ref 0.5–1.3)
EOSINOPHIL # BLD AUTO: 0.2 K/UL — SIGNIFICANT CHANGE UP (ref 0–0.5)
FIBRINOGEN PPP-MCNC: 1096 MG/DL — HIGH (ref 310–510)
FIBRINOGEN PPP-MCNC: 1126 MG/DL — HIGH (ref 310–510)
FIBRINOGEN PPP-MCNC: 1132 MG/DL — HIGH (ref 310–510)
GLUCOSE SERPL-MCNC: 82 MG/DL — SIGNIFICANT CHANGE UP (ref 70–99)
GLUCOSE SERPL-MCNC: 93 MG/DL — SIGNIFICANT CHANGE UP (ref 70–99)
HCT VFR BLD CALC: 24.2 % — LOW (ref 34.5–45)
HCT VFR BLD CALC: 26.8 % — LOW (ref 34.5–45)
HCT VFR BLD CALC: 27.6 % — LOW (ref 34.5–45)
HGB BLD-MCNC: 8.9 G/DL — LOW (ref 11.5–15.5)
HGB BLD-MCNC: 9.5 G/DL — LOW (ref 11.5–15.5)
HGB BLD-MCNC: 9.9 G/DL — LOW (ref 11.5–15.5)
HIV 1 & 2 AB SERPL IA.RAPID: SIGNIFICANT CHANGE UP
HIV 1+2 AB+HIV1 P24 AG SERPL QL IA: SIGNIFICANT CHANGE UP
INR BLD: 0.96 RATIO — SIGNIFICANT CHANGE UP (ref 0.88–1.16)
INR BLD: 0.96 RATIO — SIGNIFICANT CHANGE UP (ref 0.88–1.16)
LDH SERPL L TO P-CCNC: 805 U/L — HIGH (ref 50–242)
LDH SERPL L TO P-CCNC: 859 U/L — HIGH (ref 50–242)
LDH SERPL L TO P-CCNC: 998 U/L — HIGH (ref 50–242)
LYMPHOCYTES # BLD AUTO: 1.7 K/UL — SIGNIFICANT CHANGE UP (ref 1–3.3)
LYMPHOCYTES # BLD AUTO: 17 % — SIGNIFICANT CHANGE UP (ref 13–44)
MAGNESIUM SERPL-MCNC: 6.5 MG/DL — HIGH (ref 1.6–2.6)
MAGNESIUM SERPL-MCNC: 7.1 MG/DL — CRITICAL HIGH (ref 1.6–2.6)
MAGNESIUM SERPL-MCNC: 7.4 MG/DL — CRITICAL HIGH (ref 1.6–2.6)
MCHC RBC-ENTMCNC: 32.1 PG — SIGNIFICANT CHANGE UP (ref 27–34)
MCHC RBC-ENTMCNC: 34 PG — SIGNIFICANT CHANGE UP (ref 27–34)
MCHC RBC-ENTMCNC: 34.2 PG — HIGH (ref 27–34)
MCHC RBC-ENTMCNC: 34.4 GM/DL — SIGNIFICANT CHANGE UP (ref 32–36)
MCHC RBC-ENTMCNC: 36.7 GM/DL — HIGH (ref 32–36)
MCHC RBC-ENTMCNC: 36.8 GM/DL — HIGH (ref 32–36)
MCV RBC AUTO: 92.8 FL — SIGNIFICANT CHANGE UP (ref 80–100)
MCV RBC AUTO: 92.9 FL — SIGNIFICANT CHANGE UP (ref 80–100)
MCV RBC AUTO: 93.2 FL — SIGNIFICANT CHANGE UP (ref 80–100)
MONOCYTES # BLD AUTO: 0.8 K/UL — SIGNIFICANT CHANGE UP (ref 0–0.9)
MONOCYTES NFR BLD AUTO: 7 % — SIGNIFICANT CHANGE UP (ref 2–14)
NEUTROPHILS # BLD AUTO: 8.9 K/UL — HIGH (ref 1.8–7.4)
NEUTROPHILS NFR BLD AUTO: 76 % — SIGNIFICANT CHANGE UP (ref 43–77)
NRBC # BLD: 82 /100 — HIGH (ref 0–0)
PLAT MORPH BLD: ABNORMAL
PLATELET # BLD AUTO: 506 K/UL — HIGH (ref 150–400)
PLATELET # BLD AUTO: 533 K/UL — HIGH (ref 150–400)
PLATELET # BLD AUTO: 582 K/UL — HIGH (ref 150–400)
POTASSIUM SERPL-MCNC: 3.4 MMOL/L — LOW (ref 3.5–5.3)
POTASSIUM SERPL-MCNC: 3.7 MMOL/L — SIGNIFICANT CHANGE UP (ref 3.5–5.3)
POTASSIUM SERPL-SCNC: 3.4 MMOL/L — LOW (ref 3.5–5.3)
POTASSIUM SERPL-SCNC: 3.7 MMOL/L — SIGNIFICANT CHANGE UP (ref 3.5–5.3)
PROT SERPL-MCNC: 7.1 G/DL — SIGNIFICANT CHANGE UP (ref 6–8.3)
PROT SERPL-MCNC: 7.5 G/DL — SIGNIFICANT CHANGE UP (ref 6–8.3)
PROTHROM AB SERPL-ACNC: 10.4 SEC — SIGNIFICANT CHANGE UP (ref 9.8–12.7)
PROTHROM AB SERPL-ACNC: 10.5 SEC — SIGNIFICANT CHANGE UP (ref 9.8–12.7)
RBC # BLD: 2.61 M/UL — LOW (ref 3.8–5.2)
RBC # BLD: 2.89 M/UL — LOW (ref 3.8–5.2)
RBC # BLD: 2.96 M/UL — LOW (ref 3.8–5.2)
RBC # FLD: 20.9 % — HIGH (ref 10.3–14.5)
RBC # FLD: 21.2 % — HIGH (ref 10.3–14.5)
RBC # FLD: 21.6 % — HIGH (ref 10.3–14.5)
RBC BLD AUTO: SIGNIFICANT CHANGE UP
RH IG SCN BLD-IMP: POSITIVE — SIGNIFICANT CHANGE UP
SODIUM SERPL-SCNC: 136 MMOL/L — SIGNIFICANT CHANGE UP (ref 135–145)
SODIUM SERPL-SCNC: 138 MMOL/L — SIGNIFICANT CHANGE UP (ref 135–145)
T PALLIDUM AB TITR SER: NEGATIVE — SIGNIFICANT CHANGE UP
URATE SERPL-MCNC: 7.7 MG/DL — HIGH (ref 2.5–7)
URATE SERPL-MCNC: 8 MG/DL — HIGH (ref 2.5–7)
URATE SERPL-MCNC: 8.3 MG/DL — HIGH (ref 2.5–7)
WBC # BLD: 11.7 K/UL — HIGH (ref 3.8–10.5)
WBC # BLD: 11.9 K/UL — HIGH (ref 3.8–10.5)
WBC # BLD: 12.7 K/UL — HIGH (ref 3.8–10.5)
WBC # FLD AUTO: 11.7 K/UL — HIGH (ref 3.8–10.5)
WBC # FLD AUTO: 11.9 K/UL — HIGH (ref 3.8–10.5)
WBC # FLD AUTO: 12.7 K/UL — HIGH (ref 3.8–10.5)

## 2018-05-28 PROCEDURE — 99232 SBSQ HOSP IP/OBS MODERATE 35: CPT

## 2018-05-28 PROCEDURE — 99223 1ST HOSP IP/OBS HIGH 75: CPT | Mod: GC

## 2018-05-28 PROCEDURE — 88307 TISSUE EXAM BY PATHOLOGIST: CPT | Mod: 26

## 2018-05-28 RX ORDER — DIPHENHYDRAMINE HCL 50 MG
25 CAPSULE ORAL EVERY 6 HOURS
Refills: 0 | Status: DISCONTINUED | OUTPATIENT
Start: 2018-05-28 | End: 2018-05-31

## 2018-05-28 RX ORDER — OXYTOCIN 10 UNIT/ML
41.67 VIAL (ML) INJECTION
Qty: 20 | Refills: 0 | Status: DISCONTINUED | OUTPATIENT
Start: 2018-05-28 | End: 2018-05-31

## 2018-05-28 RX ORDER — HEPARIN SODIUM 5000 [USP'U]/ML
5000 INJECTION INTRAVENOUS; SUBCUTANEOUS EVERY 12 HOURS
Refills: 0 | Status: DISCONTINUED | OUTPATIENT
Start: 2018-05-28 | End: 2018-05-31

## 2018-05-28 RX ORDER — MAGNESIUM HYDROXIDE 400 MG/1
30 TABLET, CHEWABLE ORAL
Refills: 0 | Status: DISCONTINUED | OUTPATIENT
Start: 2018-05-28 | End: 2018-05-31

## 2018-05-28 RX ORDER — ACETAMINOPHEN 500 MG
975 TABLET ORAL EVERY 6 HOURS
Refills: 0 | Status: DISCONTINUED | OUTPATIENT
Start: 2018-05-28 | End: 2018-05-31

## 2018-05-28 RX ORDER — PRAMOXINE HYDROCHLORIDE 150 MG/15G
1 AEROSOL, FOAM RECTAL EVERY 4 HOURS
Refills: 0 | Status: DISCONTINUED | OUTPATIENT
Start: 2018-05-28 | End: 2018-05-28

## 2018-05-28 RX ORDER — SODIUM CHLORIDE 9 MG/ML
3 INJECTION INTRAMUSCULAR; INTRAVENOUS; SUBCUTANEOUS EVERY 8 HOURS
Refills: 0 | Status: DISCONTINUED | OUTPATIENT
Start: 2018-05-28 | End: 2018-05-28

## 2018-05-28 RX ORDER — PRAMOXINE HYDROCHLORIDE 150 MG/15G
1 AEROSOL, FOAM RECTAL EVERY 4 HOURS
Refills: 0 | Status: DISCONTINUED | OUTPATIENT
Start: 2018-05-28 | End: 2018-05-31

## 2018-05-28 RX ORDER — HYDROCORTISONE 1 %
1 OINTMENT (GRAM) TOPICAL EVERY 4 HOURS
Refills: 0 | Status: DISCONTINUED | OUTPATIENT
Start: 2018-05-28 | End: 2018-05-31

## 2018-05-28 RX ORDER — AER TRAVELER 0.5 G/1
1 SOLUTION RECTAL; TOPICAL EVERY 4 HOURS
Refills: 0 | Status: DISCONTINUED | OUTPATIENT
Start: 2018-05-28 | End: 2018-05-31

## 2018-05-28 RX ORDER — ACETAMINOPHEN 500 MG
1000 TABLET ORAL ONCE
Refills: 0 | Status: COMPLETED | OUTPATIENT
Start: 2018-05-28 | End: 2018-05-28

## 2018-05-28 RX ORDER — OXYCODONE HYDROCHLORIDE 5 MG/1
5 TABLET ORAL
Refills: 0 | Status: DISCONTINUED | OUTPATIENT
Start: 2018-05-28 | End: 2018-05-30

## 2018-05-28 RX ORDER — AZITHROMYCIN 500 MG/1
500 TABLET, FILM COATED ORAL DAILY
Refills: 0 | Status: DISCONTINUED | OUTPATIENT
Start: 2018-05-28 | End: 2018-05-30

## 2018-05-28 RX ORDER — SIMETHICONE 80 MG/1
80 TABLET, CHEWABLE ORAL EVERY 6 HOURS
Refills: 0 | Status: DISCONTINUED | OUTPATIENT
Start: 2018-05-28 | End: 2018-05-31

## 2018-05-28 RX ORDER — TETANUS TOXOID, REDUCED DIPHTHERIA TOXOID AND ACELLULAR PERTUSSIS VACCINE, ADSORBED 5; 2.5; 8; 8; 2.5 [IU]/.5ML; [IU]/.5ML; UG/.5ML; UG/.5ML; UG/.5ML
0.5 SUSPENSION INTRAMUSCULAR ONCE
Refills: 0 | Status: DISCONTINUED | OUTPATIENT
Start: 2018-05-28 | End: 2018-05-31

## 2018-05-28 RX ORDER — GLYCERIN ADULT
1 SUPPOSITORY, RECTAL RECTAL AT BEDTIME
Refills: 0 | Status: DISCONTINUED | OUTPATIENT
Start: 2018-05-28 | End: 2018-05-31

## 2018-05-28 RX ORDER — AER TRAVELER 0.5 G/1
1 SOLUTION RECTAL; TOPICAL EVERY 4 HOURS
Refills: 0 | Status: DISCONTINUED | OUTPATIENT
Start: 2018-05-28 | End: 2018-05-28

## 2018-05-28 RX ORDER — SODIUM CHLORIDE 9 MG/ML
3 INJECTION INTRAMUSCULAR; INTRAVENOUS; SUBCUTANEOUS EVERY 8 HOURS
Refills: 0 | Status: DISCONTINUED | OUTPATIENT
Start: 2018-05-28 | End: 2018-05-31

## 2018-05-28 RX ORDER — MORPHINE SULFATE 50 MG/1
4 CAPSULE, EXTENDED RELEASE ORAL ONCE
Refills: 0 | Status: DISCONTINUED | OUTPATIENT
Start: 2018-05-28 | End: 2018-05-28

## 2018-05-28 RX ORDER — MAGNESIUM SULFATE 500 MG/ML
1.5 VIAL (ML) INJECTION
Qty: 40 | Refills: 0 | Status: DISCONTINUED | OUTPATIENT
Start: 2018-05-28 | End: 2018-05-29

## 2018-05-28 RX ORDER — DIBUCAINE 1 %
1 OINTMENT (GRAM) RECTAL EVERY 4 HOURS
Refills: 0 | Status: DISCONTINUED | OUTPATIENT
Start: 2018-05-28 | End: 2018-05-31

## 2018-05-28 RX ORDER — ACETAMINOPHEN 500 MG
975 TABLET ORAL EVERY 6 HOURS
Refills: 0 | Status: COMPLETED | OUTPATIENT
Start: 2018-05-28 | End: 2019-04-26

## 2018-05-28 RX ORDER — DIBUCAINE 1 %
1 OINTMENT (GRAM) RECTAL EVERY 4 HOURS
Refills: 0 | Status: DISCONTINUED | OUTPATIENT
Start: 2018-05-28 | End: 2018-05-28

## 2018-05-28 RX ORDER — OXYCODONE HYDROCHLORIDE 5 MG/1
5 TABLET ORAL EVERY 4 HOURS
Refills: 0 | Status: DISCONTINUED | OUTPATIENT
Start: 2018-05-28 | End: 2018-05-30

## 2018-05-28 RX ORDER — DOCUSATE SODIUM 100 MG
100 CAPSULE ORAL
Refills: 0 | Status: DISCONTINUED | OUTPATIENT
Start: 2018-05-28 | End: 2018-05-31

## 2018-05-28 RX ORDER — IBUPROFEN 200 MG
600 TABLET ORAL EVERY 6 HOURS
Refills: 0 | Status: COMPLETED | OUTPATIENT
Start: 2018-05-28 | End: 2019-04-26

## 2018-05-28 RX ORDER — HYDROCORTISONE 1 %
1 OINTMENT (GRAM) TOPICAL EVERY 4 HOURS
Refills: 0 | Status: DISCONTINUED | OUTPATIENT
Start: 2018-05-28 | End: 2018-05-28

## 2018-05-28 RX ORDER — LANOLIN
1 OINTMENT (GRAM) TOPICAL EVERY 6 HOURS
Refills: 0 | Status: DISCONTINUED | OUTPATIENT
Start: 2018-05-28 | End: 2018-05-31

## 2018-05-28 RX ORDER — IBUPROFEN 200 MG
600 TABLET ORAL EVERY 6 HOURS
Refills: 0 | Status: DISCONTINUED | OUTPATIENT
Start: 2018-05-28 | End: 2018-05-31

## 2018-05-28 RX ORDER — NIFEDIPINE 30 MG
30 TABLET, EXTENDED RELEASE 24 HR ORAL DAILY
Refills: 0 | Status: DISCONTINUED | OUTPATIENT
Start: 2018-05-28 | End: 2018-05-31

## 2018-05-28 RX ADMIN — HYDROMORPHONE HYDROCHLORIDE 1 MILLIGRAM(S): 2 INJECTION INTRAMUSCULAR; INTRAVENOUS; SUBCUTANEOUS at 14:50

## 2018-05-28 RX ADMIN — SODIUM CHLORIDE 3 MILLILITER(S): 9 INJECTION INTRAMUSCULAR; INTRAVENOUS; SUBCUTANEOUS at 21:30

## 2018-05-28 RX ADMIN — Medication 975 MILLIGRAM(S): at 23:05

## 2018-05-28 RX ADMIN — Medication 37.5 GM/HR: at 20:33

## 2018-05-28 RX ADMIN — Medication 125 MILLIUNIT(S)/MIN: at 11:34

## 2018-05-28 RX ADMIN — Medication 400 MILLIGRAM(S): at 07:30

## 2018-05-28 RX ADMIN — CEFTRIAXONE 100 GRAM(S): 500 INJECTION, POWDER, FOR SOLUTION INTRAMUSCULAR; INTRAVENOUS at 17:36

## 2018-05-28 RX ADMIN — OXYCODONE HYDROCHLORIDE 5 MILLIGRAM(S): 5 TABLET ORAL at 23:05

## 2018-05-28 RX ADMIN — URSODIOL 300 MILLIGRAM(S): 250 TABLET, FILM COATED ORAL at 07:37

## 2018-05-28 RX ADMIN — HYDROMORPHONE HYDROCHLORIDE 1 MILLIGRAM(S): 2 INJECTION INTRAMUSCULAR; INTRAVENOUS; SUBCUTANEOUS at 11:30

## 2018-05-28 RX ADMIN — Medication 1000 MILLIGRAM(S): at 11:34

## 2018-05-28 RX ADMIN — OXYCODONE HYDROCHLORIDE 5 MILLIGRAM(S): 5 TABLET ORAL at 22:35

## 2018-05-28 RX ADMIN — Medication 975 MILLIGRAM(S): at 22:35

## 2018-05-28 RX ADMIN — Medication 50 GM/HR: at 11:27

## 2018-05-28 RX ADMIN — HYDROMORPHONE HYDROCHLORIDE 1 MILLIGRAM(S): 2 INJECTION INTRAMUSCULAR; INTRAVENOUS; SUBCUTANEOUS at 14:42

## 2018-05-28 RX ADMIN — Medication 30 MILLIGRAM(S): at 12:11

## 2018-05-28 RX ADMIN — AZITHROMYCIN 500 MILLIGRAM(S): 500 TABLET, FILM COATED ORAL at 16:36

## 2018-05-28 NOTE — PROVIDER CONTACT NOTE (CRITICAL VALUE NOTIFICATION) - ACTION/TREATMENT ORDERED:
magnesium stopped
Turn off mag for 2 hours and restart mag. at 1.5gm /hour at 2030 as ordered by Dr. Mariano.
Will continue to monitor closely.

## 2018-05-28 NOTE — PROVIDER CONTACT NOTE (CRITICAL VALUE NOTIFICATION) - ASSESSMENT
pt aaox4 and lungs clear DTR +2; urine output 350 over 2 hours
DTR 2+ , LS clear basim.
Pt denies chest discomfort, dizziness or feeling light headed.

## 2018-05-28 NOTE — PROGRESS NOTE ADULT - SUBJECTIVE AND OBJECTIVE BOX
CHIEF COMPLAINT: sickle cell disease / pregnancy / hypoxemia    Interval Events:  patient had an induced vaginal delivery early today. she is doing ok. states that she does not have any chest pain. on significant dyspnea. she is on NRB though -- changed from nasal canula 2/2 nose bleed. otherwise, she has a headache post epidural.     she never had acute chest or exchange transfusion.    REVIEW OF SYSTEMS:  Constitutional: No fevers or chills. No weight loss. No fatigue or generalised malaise.  Eyes: No itching or discharge from the eyes  ENT: No ear pain. No ear discharge. No nasal congestion. No post nasal drip. No epistaxis. No throat pain. No sore throat. No difficulty swallowing.   CV: No chest pain. No palpitations. No lightheadedness or dizziness.   Resp: see above  GI: No nausea. No vomiting. No diarrhea.  MSK: No joint pain or pain in any extremities  Integumentary: No skin lesions. No pedal edema.  Neurological: No gross motor weakness. No sensory changes.  [x] All other systems negative  [ ] Unable to assess ROS because ________    OBJECTIVE:  ICU Vital Signs Last 24 Hrs  T(C): 36.8 (28 May 2018 10:00), Max: 36.8 (28 May 2018 10:00)  T(F): 98.2 (28 May 2018 10:00), Max: 98.2 (28 May 2018 10:00)  HR: 69 (28 May 2018 12:00) (52 - 82)  BP: 139/74 (28 May 2018 12:00) (126/79 - 174/88)  RR: 18 (28 May 2018 12:00) (18 - 18)  SpO2: 100% (28 May 2018 12:00) (94% - 100%)        05-27 @ 07:01  -  05-28 @ 07:00  --------------------------------------------------------  IN: 2900 mL / OUT: 7250 mL / NET: -4350 mL    05-28 @ 07:01 - 05-28 @ 12:27  --------------------------------------------------------  IN: 0 mL / OUT: 350 mL / NET: -350 mL      CAPILLARY BLOOD GLUCOSE          PHYSICAL EXAM:  General: Awake, alert, oriented X 3.   HEENT: Atraumatic, normocephalic.   Lymph Nodes: No palpable lymphadenopathy  Neck: No JVD. No carotid bruit.   Respiratory: Normal chest expansion                         Normal percussion                         Normal and equal air entry                         No wheeze, rhonchi or rales.  Cardiovascular: S1 S2 normal. No murmurs, rubs or gallops.   Abdomen: Soft, non-tender, non-distended. No organomegaly.  Extremities: Warm to touch. Peripheral pulse palpable. No pedal edema.   Skin: No rashes or skin lesions  Neurological: Motor and sensory examination equal and normal in all four extremities.  Psychiatry: Appropriate mood and affect.    HOSPITAL MEDICATIONS:  MEDICATIONS  (STANDING):  cefTRIAXone   IVPB 1 Gram(s) IV Intermittent every 24 hours  cefTRIAXone   IVPB      folic acid 0.8 milliGRAM(s) Oral daily  lactated ringers. 1000 milliLiter(s) (50 mL/Hr) IV Continuous <Continuous>  magnesium sulfate Infusion 2 Gm/Hr (50 mL/Hr) IV Continuous <Continuous>  misoprostol Oral Solution 60 MICROGram(s) Oral every 2 hours  NIFEdipine XL 30 milliGRAM(s) Oral daily  oxytocin Infusion 41.667 milliUNIT(s)/Min (125 mL/Hr) IV Continuous <Continuous>  prenatal multivitamin 1 Tablet(s) Oral daily  sodium chloride 0.65% Nasal 1 Spray(s) Both Nostrils two times a day  sodium chloride 0.9% lock flush 3 milliLiter(s) IV Push every 8 hours  ursodiol Capsule 300 milliGRAM(s) Oral every 8 hours    MEDICATIONS  (PRN):  dibucaine 1% Ointment 1 Application(s) Topical every 4 hours PRN Tenderness of the episiotomy site  hydrocortisone 1% Cream 1 Application(s) Topical every 4 hours PRN Moderate to Severe Perineal Pain  HYDROmorphone   Tablet 8 milliGRAM(s) Oral every 3 hours PRN moderate to severe pain  HYDROmorphone  Injectable 1 milliGRAM(s) IV Push every 2 hours PRN severe pain not managed by PO dilaudid  pramoxine 1%/zinc 5% Cream 1 Application(s) Topical every 4 hours PRN Moderate to Severe Perineal Pain  witch hazel Pads 1 Application(s) Topical every 4 hours PRN Perineal discomfort      LABS:                        9.9    11.9  )-----------( 506      ( 28 May 2018 11:07 )             26.8     05-28    138  |  103  |  6<L>  ----------------------------<  93  3.7   |  21<L>  |  0.70    Ca    8.3<L>      28 May 2018 11:07  Mg     7.4     05-28    TPro  7.5  /  Alb  2.8<L>  /  TBili  4.1<H>  /  DBili  x   /  AST  165<H>  /  ALT  78<H>  /  AlkPhos  261<H>  05-28    PT/INR - ( 28 May 2018 11:07 )   PT: 10.5 sec;   INR: 0.96 ratio         PTT - ( 28 May 2018 11:07 )  PTT:26.6 sec          MICROBIOLOGY:     RADIOLOGY:  [ ] Reviewed and interpreted by me    Point of Care Ultrasound Findings:    PFT:    EKG: CHIEF COMPLAINT: sickle cell disease / pregnancy / hypoxemia    Interval Events:  patient had an induced vaginal delivery early today. she is doing ok. states that she does not have any chest pain. no significant dyspnea. she is on NRB though -- changed from nasal canula 2/2 nose bleed. otherwise, she has a headache post epidural.     she never had acute chest or exchange transfusion.    on NRB -- when taken off -- on RA -- she desaturates to 91-92%     REVIEW OF SYSTEMS:  Constitutional: No fevers or chills. No weight loss. No fatigue or generalised malaise.  Eyes: No itching or discharge from the eyes  ENT: No ear pain. No ear discharge. No nasal congestion. No post nasal drip. No epistaxis. No throat pain. No sore throat. No difficulty swallowing.   CV: No chest pain. No palpitations. No lightheadedness or dizziness.   Resp: see above  GI: No nausea. No vomiting. No diarrhea.  MSK: No joint pain or pain in any extremities  Integumentary: No skin lesions. No pedal edema.  Neurological: No gross motor weakness. No sensory changes.  [x] All other systems negative  [ ] Unable to assess ROS because ________    OBJECTIVE:  ICU Vital Signs Last 24 Hrs  T(C): 36.8 (28 May 2018 10:00), Max: 36.8 (28 May 2018 10:00)  T(F): 98.2 (28 May 2018 10:00), Max: 98.2 (28 May 2018 10:00)  HR: 69 (28 May 2018 12:00) (52 - 82)  BP: 139/74 (28 May 2018 12:00) (126/79 - 174/88)  RR: 18 (28 May 2018 12:00) (18 - 18)  SpO2: 100% (28 May 2018 12:00) (94% - 100%)        05-27 @ 07:01  -  05-28 @ 07:00  --------------------------------------------------------  IN: 2900 mL / OUT: 7250 mL / NET: -4350 mL    05-28 @ 07:01 - 05-28 @ 12:27  --------------------------------------------------------  IN: 0 mL / OUT: 350 mL / NET: -350 mL      CAPILLARY BLOOD GLUCOSE          PHYSICAL EXAM:  General: Awake, alert, oriented X 3. pleasant. on NRB. lying flat.  HEENT: Atraumatic, normocephalic.   Lymph Nodes: No palpable lymphadenopathy  Neck: No JVD. No carotid bruit.   Respiratory: Normal chest expansion. CTA b/l. good aeration.   Cardiovascular: S1 S2 normal. No murmurs, rubs or gallops.   Abdomen: Soft, non-tender, non-distended. No organomegaly.  Extremities: Warm to touch. Peripheral pulse palpable. No pedal edema.   Skin: No rashes or skin lesions  Neurological: Motor and sensory examination equal and normal in all four extremities.  Psychiatry: Appropriate mood and affect.    HOSPITAL MEDICATIONS:  MEDICATIONS  (STANDING):  cefTRIAXone   IVPB 1 Gram(s) IV Intermittent every 24 hours  cefTRIAXone   IVPB      folic acid 0.8 milliGRAM(s) Oral daily  lactated ringers. 1000 milliLiter(s) (50 mL/Hr) IV Continuous <Continuous>  magnesium sulfate Infusion 2 Gm/Hr (50 mL/Hr) IV Continuous <Continuous>  misoprostol Oral Solution 60 MICROGram(s) Oral every 2 hours  NIFEdipine XL 30 milliGRAM(s) Oral daily  oxytocin Infusion 41.667 milliUNIT(s)/Min (125 mL/Hr) IV Continuous <Continuous>  prenatal multivitamin 1 Tablet(s) Oral daily  sodium chloride 0.65% Nasal 1 Spray(s) Both Nostrils two times a day  sodium chloride 0.9% lock flush 3 milliLiter(s) IV Push every 8 hours  ursodiol Capsule 300 milliGRAM(s) Oral every 8 hours    MEDICATIONS  (PRN):  dibucaine 1% Ointment 1 Application(s) Topical every 4 hours PRN Tenderness of the episiotomy site  hydrocortisone 1% Cream 1 Application(s) Topical every 4 hours PRN Moderate to Severe Perineal Pain  HYDROmorphone   Tablet 8 milliGRAM(s) Oral every 3 hours PRN moderate to severe pain  HYDROmorphone  Injectable 1 milliGRAM(s) IV Push every 2 hours PRN severe pain not managed by PO dilaudid  pramoxine 1%/zinc 5% Cream 1 Application(s) Topical every 4 hours PRN Moderate to Severe Perineal Pain  witch hazel Pads 1 Application(s) Topical every 4 hours PRN Perineal discomfort      LABS:                        9.9    11.9  )-----------( 506      ( 28 May 2018 11:07 )             26.8     05-28    138  |  103  |  6<L>  ----------------------------<  93  3.7   |  21<L>  |  0.70    Ca    8.3<L>      28 May 2018 11:07  Mg     7.4     05-28    TPro  7.5  /  Alb  2.8<L>  /  TBili  4.1<H>  /  DBili  x   /  AST  165<H>  /  ALT  78<H>  /  AlkPhos  261<H>  05-28    PT/INR - ( 28 May 2018 11:07 )   PT: 10.5 sec;   INR: 0.96 ratio         PTT - ( 28 May 2018 11:07 )  PTT:26.6 sec    RADIOLOGY:  [x] Reviewed and interpreted by me

## 2018-05-28 NOTE — PROGRESS NOTE ADULT - SUBJECTIVE AND OBJECTIVE BOX
32 y/o woman SS disease who is currently 32 weeks pregnant admitted with VOC. Asymptomatic.    INTERVAL HPI/OVERNIGHT EVENTS:  Patient S&E at bedside. No o/n events,     VITAL SIGNS:  T(F): 98.2 (05-28-18 @ 10:00)  HR: 63 (05-28-18 @ 13:30)  BP: 150/89 (05-28-18 @ 13:30)  RR: 18 (05-28-18 @ 13:30)  SpO2: 100% (05-28-18 @ 13:30)  Wt(kg): --    PHYSICAL EXAM:    Constitutional: NAD  Eyes: EOMI, sclera non-icteric  Neck: supple, no masses, no JVD  Respiratory: CTA b/l, good air entry b/l  Cardiovascular: RRR, no M/R/G  Gastrointestinal: soft, NTND, no masses palpable, + BS, no hepatosplenomegaly  Extremities: no c/c/e  Neurological: AAOx3      MEDICATIONS  (STANDING):  azithromycin   Tablet 500 milliGRAM(s) Oral daily  cefTRIAXone   IVPB 1 Gram(s) IV Intermittent every 24 hours  cefTRIAXone   IVPB      folic acid 0.8 milliGRAM(s) Oral daily  lactated ringers. 1000 milliLiter(s) (50 mL/Hr) IV Continuous <Continuous>  magnesium sulfate Infusion 2 Gm/Hr (50 mL/Hr) IV Continuous <Continuous>  misoprostol Oral Solution 60 MICROGram(s) Oral every 2 hours  NIFEdipine XL 30 milliGRAM(s) Oral daily  oxytocin Infusion 41.667 milliUNIT(s)/Min (125 mL/Hr) IV Continuous <Continuous>  prenatal multivitamin 1 Tablet(s) Oral daily  sodium chloride 0.65% Nasal 1 Spray(s) Both Nostrils two times a day  sodium chloride 0.9% lock flush 3 milliLiter(s) IV Push every 8 hours    MEDICATIONS  (PRN):  dibucaine 1% Ointment 1 Application(s) Topical every 4 hours PRN Tenderness of the episiotomy site  hydrocortisone 1% Cream 1 Application(s) Topical every 4 hours PRN Moderate to Severe Perineal Pain  HYDROmorphone   Tablet 8 milliGRAM(s) Oral every 3 hours PRN moderate to severe pain  HYDROmorphone  Injectable 1 milliGRAM(s) IV Push every 2 hours PRN severe pain not managed by PO dilaudid  pramoxine 1%/zinc 5% Cream 1 Application(s) Topical every 4 hours PRN Moderate to Severe Perineal Pain  witch hazel Pads 1 Application(s) Topical every 4 hours PRN Perineal discomfort      Allergies    No Known Allergies    Intolerances        LABS:                        9.9    11.9  )-----------( 506      ( 28 May 2018 11:07 )             26.8     05-28    138  |  103  |  6<L>  ----------------------------<  93  3.7   |  21<L>  |  0.70    Ca    8.3<L>      28 May 2018 11:07  Mg     6.5     05-28    TPro  7.5  /  Alb  2.8<L>  /  TBili  4.1<H>  /  DBili  x   /  AST  165<H>  /  ALT  78<H>  /  AlkPhos  261<H>  05-28    PT/INR - ( 28 May 2018 11:07 )   PT: 10.5 sec;   INR: 0.96 ratio         PTT - ( 28 May 2018 11:07 )  PTT:26.6 sec      RADIOLOGY & ADDITIONAL TESTS:  Studies reviewed.

## 2018-05-28 NOTE — PROGRESS NOTE ADULT - ASSESSMENT
33 year old female with PMH SCD, 33 weeks gestation, who was transferred from [facility] for concern for vaso-occlusive crisis, now with pre-eclampsia and hypoxic respiratory failure.  s/p delivery this AM.  Now doing ok. c/b severe headaches.     Her hypoxemic respiratory failure is likely multifactorial: possible infectious [consolidation on CT] + atelectasis + underlying sickle cell disease/pHTN.  CT was negative for PE.  Agree with treatment for CA-PNA. Lower suspicion for acute chest given her clinical status.     Currently when taken off NRB -- she desaturates to approx 91-92% on RA. improved slightly with deep inspiration.     Recommend:   - completing treatment for PNA with ceftraixone and azithromycin.   - check sputum cx if able.   - encourage incentive spirometry  - ambulate when able.  - pain control   - please give her nasal canula with humidified air [will help with her nose bleed]; can change from NRB to nasal canula.   - keep O2 sats >96%.   - official ECHO  - will continue to monitor.     Caleb Lopez MD   Pulmonary Fellow

## 2018-05-28 NOTE — PROGRESS NOTE ADULT - ASSESSMENT
34 y/o woman SS disease who is currently 32 weeks pregnant admitted with VOC. S/p 1 unit of PRBC transfusion. In the delivery room. Asymptomatic.    Plan: Continue to monitor closely CBC post delivery.

## 2018-05-28 NOTE — PROVIDER CONTACT NOTE (OTHER) - SITUATION
s/p  at 33.2 weeks. Was on Magnesium for PEC, but d/c'd 2 2hours ago because of elevated Magnesium level. Patient now with elevated blood pressures and severe headache.

## 2018-05-28 NOTE — PROGRESS NOTE ADULT - ATTENDING COMMENTS
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 Infiltrates with ground glass surrounding dense center, fungal appearing. Will tentatively schedule BAL for Wednesday. Patient with sickle cell crisis, pregnancy, and hypoxemia, consolidation on CT scan. Will treat with antibiotics, incentive spirometry. Exchange transfusion if condition worsens.

## 2018-05-29 DIAGNOSIS — J18.9 PNEUMONIA, UNSPECIFIED ORGANISM: ICD-10-CM

## 2018-05-29 DIAGNOSIS — R51 HEADACHE: ICD-10-CM

## 2018-05-29 LAB
ALBUMIN SERPL ELPH-MCNC: 2.1 G/DL — LOW (ref 3.3–5)
ALP SERPL-CCNC: 223 U/L — HIGH (ref 40–120)
ALT FLD-CCNC: 81 U/L — HIGH (ref 10–45)
ANION GAP SERPL CALC-SCNC: 13 MMOL/L — SIGNIFICANT CHANGE UP (ref 5–17)
APTT BLD: 27.1 SEC — LOW (ref 27.5–37.4)
AST SERPL-CCNC: 186 U/L — HIGH (ref 10–40)
BASOPHILS # BLD AUTO: 0.4 K/UL — HIGH (ref 0–0.2)
BILIRUB SERPL-MCNC: 2.9 MG/DL — HIGH (ref 0.2–1.2)
BUN SERPL-MCNC: 8 MG/DL — SIGNIFICANT CHANGE UP (ref 7–23)
CALCIUM SERPL-MCNC: 7.5 MG/DL — LOW (ref 8.4–10.5)
CHLORIDE SERPL-SCNC: 103 MMOL/L — SIGNIFICANT CHANGE UP (ref 96–108)
CO2 SERPL-SCNC: 23 MMOL/L — SIGNIFICANT CHANGE UP (ref 22–31)
CREAT SERPL-MCNC: 0.68 MG/DL — SIGNIFICANT CHANGE UP (ref 0.5–1.3)
EOSINOPHIL # BLD AUTO: 0.4 K/UL — SIGNIFICANT CHANGE UP (ref 0–0.5)
EOSINOPHIL NFR BLD AUTO: 3 % — SIGNIFICANT CHANGE UP (ref 0–6)
FIBRINOGEN PPP-MCNC: 1024 MG/DL — SIGNIFICANT CHANGE UP (ref 310–510)
GIANT PLATELETS BLD QL SMEAR: PRESENT — SIGNIFICANT CHANGE UP
GLUCOSE SERPL-MCNC: 80 MG/DL — SIGNIFICANT CHANGE UP (ref 70–99)
HCT VFR BLD CALC: 24.1 % — LOW (ref 34.5–45)
HGB BLD-MCNC: 8.6 G/DL — LOW (ref 11.5–15.5)
INR BLD: 0.98 RATIO — SIGNIFICANT CHANGE UP (ref 0.88–1.16)
LYMPHOCYTES # BLD AUTO: 34 % — SIGNIFICANT CHANGE UP (ref 13–44)
LYMPHOCYTES # BLD AUTO: 49 K/UL — HIGH (ref 1–3.3)
MAGNESIUM SERPL-MCNC: 5.5 MG/DL — HIGH (ref 1.6–2.6)
MAGNESIUM SERPL-MCNC: 6.5 MG/DL — HIGH (ref 1.6–2.6)
MCHC RBC-ENTMCNC: 33.9 PG — SIGNIFICANT CHANGE UP (ref 27–34)
MCHC RBC-ENTMCNC: 35.8 GM/DL — SIGNIFICANT CHANGE UP (ref 32–36)
MCV RBC AUTO: 94.7 FL — SIGNIFICANT CHANGE UP (ref 80–100)
MONOCYTES # BLD AUTO: 1.1 K/UL — HIGH (ref 0–0.9)
MONOCYTES NFR BLD AUTO: 8 % — SIGNIFICANT CHANGE UP (ref 2–14)
NEUTROPHILS # BLD AUTO: 6.3 K/UL — SIGNIFICANT CHANGE UP (ref 1.8–7.4)
NEUTROPHILS NFR BLD AUTO: 55 % — SIGNIFICANT CHANGE UP (ref 43–77)
NRBC # BLD: 33 /100 — HIGH (ref 0–0)
PLAT MORPH BLD: ABNORMAL
PLATELET # BLD AUTO: 409 K/UL — HIGH (ref 150–400)
POTASSIUM SERPL-MCNC: 4.1 MMOL/L — SIGNIFICANT CHANGE UP (ref 3.5–5.3)
POTASSIUM SERPL-SCNC: 4.1 MMOL/L — SIGNIFICANT CHANGE UP (ref 3.5–5.3)
PROT SERPL-MCNC: 6.5 G/DL — SIGNIFICANT CHANGE UP (ref 6–8.3)
PROTHROM AB SERPL-ACNC: 10.7 SEC — SIGNIFICANT CHANGE UP (ref 9.8–12.7)
RBC # BLD: 2.55 M/UL — LOW (ref 3.8–5.2)
RBC # FLD: 20.1 % — HIGH (ref 10.3–14.5)
RBC BLD AUTO: SIGNIFICANT CHANGE UP
SODIUM SERPL-SCNC: 139 MMOL/L — SIGNIFICANT CHANGE UP (ref 135–145)
URATE SERPL-MCNC: 8.7 MG/DL — HIGH (ref 2.5–7)
WBC # BLD: 15.3 K/UL — HIGH (ref 3.8–10.5)
WBC # FLD AUTO: 15.3 K/UL — HIGH (ref 3.8–10.5)

## 2018-05-29 PROCEDURE — 99232 SBSQ HOSP IP/OBS MODERATE 35: CPT | Mod: GC

## 2018-05-29 PROCEDURE — 99233 SBSQ HOSP IP/OBS HIGH 50: CPT | Mod: 25,GC

## 2018-05-29 PROCEDURE — 76604 US EXAM CHEST: CPT | Mod: 26,GC

## 2018-05-29 RX ADMIN — CEFTRIAXONE 100 GRAM(S): 500 INJECTION, POWDER, FOR SOLUTION INTRAMUSCULAR; INTRAVENOUS at 18:27

## 2018-05-29 RX ADMIN — Medication 975 MILLIGRAM(S): at 06:47

## 2018-05-29 RX ADMIN — OXYCODONE HYDROCHLORIDE 5 MILLIGRAM(S): 5 TABLET ORAL at 06:47

## 2018-05-29 RX ADMIN — Medication 975 MILLIGRAM(S): at 06:17

## 2018-05-29 RX ADMIN — Medication 30 MILLIGRAM(S): at 15:16

## 2018-05-29 RX ADMIN — Medication 0.8 MILLIGRAM(S): at 18:28

## 2018-05-29 RX ADMIN — SODIUM CHLORIDE 3 MILLILITER(S): 9 INJECTION INTRAMUSCULAR; INTRAVENOUS; SUBCUTANEOUS at 14:39

## 2018-05-29 RX ADMIN — Medication 1 TABLET(S): at 15:16

## 2018-05-29 RX ADMIN — OXYCODONE HYDROCHLORIDE 5 MILLIGRAM(S): 5 TABLET ORAL at 06:17

## 2018-05-29 RX ADMIN — SODIUM CHLORIDE 3 MILLILITER(S): 9 INJECTION INTRAMUSCULAR; INTRAVENOUS; SUBCUTANEOUS at 06:11

## 2018-05-29 RX ADMIN — AZITHROMYCIN 500 MILLIGRAM(S): 500 TABLET, FILM COATED ORAL at 18:27

## 2018-05-29 RX ADMIN — Medication 600 MILLIGRAM(S): at 19:22

## 2018-05-29 RX ADMIN — SODIUM CHLORIDE 3 MILLILITER(S): 9 INJECTION INTRAMUSCULAR; INTRAVENOUS; SUBCUTANEOUS at 21:49

## 2018-05-29 RX ADMIN — Medication 600 MILLIGRAM(S): at 19:52

## 2018-05-29 NOTE — PROGRESS NOTE ADULT - ASSESSMENT
33 year old female with PMH SCD, 33 weeks gestation, who was transferred from [facility] for concern for vaso-occlusive crisis, now with pre-eclampsia and hypoxic respiratory failure 2/2 LLL PNA low suspicion for acute chest.    - Patient stable. 90% at rest. Patient not feeling dyspneic. Not ambulating yet because of headache post epidural.  - Usual sickle cell crisis does not cause pain but just fatigue although during pregnancy, she had diffuse body aches and pains.  - Hgb stable. Please check daily reticulocyte count and LDH  - May 33 year old female with PMH SCD, 33 weeks gestation, who was transferred from [facility] for concern for vaso-occlusive crisis, now with pre-eclampsia s/p vaginal delivery 2 days ago, and hypoxic respiratory failure 2/2 LLL PNA low suspicion for acute chest.    - Patient stable. 90% at rest. Patient not feeling dyspneic. Not ambulating yet because of headache post epidural.  - Usual sickle cell crisis does not cause pain but just fatigue although during pregnancy, she had diffuse body aches and pains.  - Hgb stable. Please check daily reticulocyte count and LDH.   - May consider switching to Augmentin ( should be breast feeding friendly ) for 7 day total course.  - Incentive spirometry and ambulation ( please check ambulatory saturation )  - Patient not clinically worsening. No indication for acute chest at this time. 33 year old female with PMH SCD, 33 weeks gestation, who was transferred from [facility] for concern for vaso-occlusive crisis, now with pre-eclampsia s/p vaginal delivery 2 days ago, and hypoxic respiratory failure 2/2 LLL PNA low suspicion for acute chest.    - Bedside US showing small alveolar consolidation at left base. No pleural effusion. Few focal B-lines. LV appears mildly enlarged with no signs of RV strain. Normal RV/LV ratio and no pericardial effusion.    - Patient stable. 94% at rest. Patient not feeling dyspneic. Not ambulating yet because of headache post epidural.  - Usual sickle cell crisis does not cause pain but just fatigue although during pregnancy, she had diffuse body aches and pains.  - Hgb stable. Please check daily reticulocyte count and LDH.   - May consider switching to Augmentin before discharge ( should be breast feeding friendly ) for 7 day total course for her PNA.  - Incentive spirometry and ambulation ( please check ambulatory saturation )  - Patient not clinically worsening. No indication for acute chest at this time.  - Would recommend TTE to evaluate for LV function.

## 2018-05-29 NOTE — PROVIDER CONTACT NOTE (CHANGE IN STATUS NOTIFICATION) - ACTION/TREATMENT ORDERED:
Dr Ng made aware and will evaluate patient, NST initiated
none at this time.
orders carried out, cont to assess

## 2018-05-29 NOTE — PROGRESS NOTE ADULT - ATTENDING COMMENTS
Ms. Becerra is HD stable and oxygenating in the low to mid 90s.  LFTs remain mildly elevated and she is anemic.  Coags are WNL.  Chest CT shows pneumonia vs atelectasis.  She is being treated with antibiotics.  PA appears enlarged by CT.  Our bedside echo suggests enlarged LV with decreased function.  Would obtain official echo to assess LV, RV and rule out pulmonary ypertension.  Agree with current management.

## 2018-05-29 NOTE — PROGRESS NOTE ADULT - SUBJECTIVE AND OBJECTIVE BOX
I was asked to see this young woman who delivered yesterday.   She has  a history significant for sickle cell disease.  She delivered her baby yesterday,  with epidural analgesia.  Of note, she did sustain a dural puncture.    When I arrived in the room, the patient was lying flat in no apparent distress.  She described a headache which is very much positional in nature--worse on standing or sitting, relieved by lying down.  She denies any other symptoms such as hearing or visual changes.  She also denies fever.   On reading the anesthetic record, it is reported that this patient did suffer from a dural puncture and her symptoms are consistent with that.  I counseled the patient regarding her options.  These include:  conservative measures-->fluids/caffeine or epidural blood patch.  While the blood patch is more definitive in regard to results, it does carry more adverse effects (although rare) including infection, another dural puncture, nerve damage, back ache.  The patient seemed to want to try conservative measures at this time.  I understand she has recently gotten up and had no HA.    Patient understands that we are always "in house" and that the EBP, should she desire, can be done at any time.        Will contine to fiollow.

## 2018-05-29 NOTE — PROVIDER CONTACT NOTE (OTHER) - RECOMMENDATIONS
Initially to given Hydralazine 10 mg IV push, now BP improved. Start Procardia 30 mg XL and restart Magnesium at 2 grams/hour. Dilaudid 1 mg to be given IVP for headache.
patient is wearing PAS stockings

## 2018-05-29 NOTE — PROGRESS NOTE ADULT - PROBLEM SELECTOR PLAN 2
- RUQ US overall unremarkable  - history of cholestasis in pregnancy  - trend LFTs daily  - c/w ursodiol
- c/w antibiotics for community acquired pneumonia

## 2018-05-29 NOTE — PROGRESS NOTE ADULT - SUBJECTIVE AND OBJECTIVE BOX
INTERVAL HPI/OVERNIGHT EVENTS:  Feels much better, reports pain is controlled. Today endorsing right facial numbness and intermittent headaches, which she had not mentioned prior. Reports symptoms were present when she first presented. She was found to have PNA on a PE study over the weekend and is on ceftriaxone and azithromycin. She was placed on magnesium for concerns for pre-eclampsia. She had induced labor yesterday.     VITAL SIGNS:  T(F): 98.4 (05-29-18 @ 16:49)  HR: 73 (05-29-18 @ 16:49)  BP: 144/84 (05-29-18 @ 16:49)  RR: 18 (05-29-18 @ 16:49)  SpO2: 96% (05-29-18 @ 16:49)  Wt(kg): --    PHYSICAL EXAM:    Constitutional: NAD  Eyes: EOMI, sclera non-icteric  Neck: supple, no masses, no JVD  Respiratory: CTA b/l, good air entry b/l  Cardiovascular: RRR, no M/R/G  Gastrointestinal: soft, NTND, no masses palpable, + BS, no hepatosplenomegaly  Extremities: no c/c/e  Neurological: AAOx3      MEDICATIONS  (STANDING):  acetaminophen   Tablet. 975 milliGRAM(s) Oral every 6 hours  azithromycin   Tablet 500 milliGRAM(s) Oral daily  cefTRIAXone   IVPB 1 Gram(s) IV Intermittent every 24 hours  cefTRIAXone   IVPB      diphtheria/tetanus/pertussis (acellular) Vaccine (ADAcel) 0.5 milliLiter(s) IntraMuscular once  folic acid 0.8 milliGRAM(s) Oral daily  heparin  Injectable 5000 Unit(s) SubCutaneous every 12 hours  ibuprofen  Tablet 600 milliGRAM(s) Oral every 6 hours  lactated ringers. 1000 milliLiter(s) (50 mL/Hr) IV Continuous <Continuous>  NIFEdipine XL 30 milliGRAM(s) Oral daily  oxyCODONE    IR 5 milliGRAM(s) Oral every 3 hours  oxytocin Infusion 41.667 milliUNIT(s)/Min (125 mL/Hr) IV Continuous <Continuous>  oxytocin Infusion 41.667 milliUNIT(s)/Min (125 mL/Hr) IV Continuous <Continuous>  prenatal multivitamin 1 Tablet(s) Oral daily  prenatal multivitamin 1 Tablet(s) Oral daily  sodium chloride 0.65% Nasal 1 Spray(s) Both Nostrils two times a day  sodium chloride 0.9% lock flush 3 milliLiter(s) IV Push every 8 hours    MEDICATIONS  (PRN):  dibucaine 1% Ointment 1 Application(s) Topical every 4 hours PRN Perineal Discomfort  diphenhydrAMINE   Capsule 25 milliGRAM(s) Oral every 6 hours PRN Itching  docusate sodium 100 milliGRAM(s) Oral two times a day PRN Stool Softening  glycerin Suppository - Adult 1 Suppository(s) Rectal at bedtime PRN Constipation  hydrocortisone 1% Cream 1 Application(s) Topical every 4 hours PRN Moderate to Severe Perineal Pain  lanolin Ointment 1 Application(s) Topical every 6 hours PRN Sore Nipples  magnesium hydroxide Suspension 30 milliLiter(s) Oral two times a day PRN Constipation  oxyCODONE    IR 5 milliGRAM(s) Oral every 4 hours PRN Severe Pain (7 -10)  pramoxine 1%/zinc 5% Cream 1 Application(s) Topical every 4 hours PRN Moderate to Severe Perineal Pain  simethicone 80 milliGRAM(s) Chew every 6 hours PRN Gas  witch hazel Pads 1 Application(s) Topical every 4 hours PRN Perineal Discomfort      Allergies    No Known Allergies    Intolerances        LABS:                        8.6    15.3  )-----------( 409      ( 29 May 2018 06:24 )             24.1     05-29    139  |  103  |  8   ----------------------------<  80  4.1   |  23  |  0.68    Ca    7.5<L>      29 May 2018 06:24  Mg     6.5     05-29    TPro  6.5  /  Alb  2.1<L>  /  TBili  2.9<H>  /  DBili  x   /  AST  186<H>  /  ALT  81<H>  /  AlkPhos  223<H>  05-29    PT/INR - ( 29 May 2018 06:24 )   PT: 10.7 sec;   INR: 0.98 ratio         PTT - ( 29 May 2018 06:24 )  PTT:27.1 sec      RADIOLOGY & ADDITIONAL TESTS:  Studies reviewed.    ASSESSMENT & PLAN:

## 2018-05-29 NOTE — CHART NOTE - NSCHARTNOTEFT_GEN_A_CORE
PGY3 Progress Note    Eval patient at bedside for paresthesias.  Describes a realities decrease in sensation to right lower/lateral mentum and right chin.  No other parasthesias, no loss of motor function.  Notes this started during her ambulance ride to the hospital and has been stable since.    Exam  Gen: NAD, alert and oriented  Neuro: Cranial nerves II -XII intact  Grossly normal flexion/extension and strength bilateral upper and lower extremities    A/P  32 yo  PPD#1 s/p  w/ sickle cell arrived w/ crisis, sPEC, reporting parasthesias low suspicion for acute event or central process  - If bothersome to patient can have non-urgent neuro follow up after discharge  D/w Dr Sae Dowell PGY3

## 2018-05-29 NOTE — PROVIDER CONTACT NOTE (CHANGE IN STATUS NOTIFICATION) - ASSESSMENT
/88, HR 77, room air POX 85%, O2 2lNC pox 96%  pt resting comfortable, no s/s of respiratory distress noted, pt free of HA, dizziness,  blurred vision, or epigastric pain,
pt complained of right facial numbness, able to open mouth, move the tongue, pt ambulate with assist with little bit of lightheadedness and dizziness
pt asymptomatic

## 2018-05-29 NOTE — PROGRESS NOTE ADULT - PROBLEM SELECTOR PLAN 1
- c/w IVF, pain control, incentive spirometry, folic acid  - overall clinically much improved  - trend CBC, CMP, LDH, reticulocyte count daily

## 2018-05-29 NOTE — PROVIDER CONTACT NOTE (OTHER) - ACTION/TREATMENT ORDERED:
Initially to given Hydralazine 10 mg IV push, now BP improved. Start Procardia 30 mg XL and restart Magnesium at 2 grams/hour. Dilaudid 1 mg to be given IVP for headache.
No new orders at this time, team may consider starting Lovenox once daily if patient agrees to it

## 2018-05-29 NOTE — PROGRESS NOTE ADULT - ATTENDING COMMENTS
33 F who was admitted at 32 weeks for VOC.  Found to have a LLL pneumonia over the weekend after she developed further hypoxemia and underwent a CTA, on rocephin/zithromax.   Her crisis appears improved.  Chin numbness likely due to sickle crisis.  She states headache preceded the epidural though she is not a strong historian- if it continues can consider a MRI of the brain.     IVF, folic acid.

## 2018-05-29 NOTE — PROVIDER CONTACT NOTE (CHANGE IN STATUS NOTIFICATION) - BACKGROUND
Pt S/P  at 33 2/7 weeks for PEC S/P Mag sulfate drip, Sickle cell crisis.
sickle cell crisis, r/o PEC, elev liver enzymes

## 2018-05-29 NOTE — PROGRESS NOTE ADULT - PROBLEM SELECTOR PLAN 3
- with associated facial numbness  - no other neurologic deficits noted no exam  - would pursue MRI brain given elevated risk of CVA    Armando Lopez, PGY4  Hematology/Oncology Fellow  Pager: [phone]

## 2018-05-29 NOTE — PROGRESS NOTE ADULT - SUBJECTIVE AND OBJECTIVE BOX
CHIEF COMPLAINT:    Interval Events:    REVIEW OF SYSTEMS:  Constitutional: [ ] negative [ ] fevers [ ] chills [ ] weight loss [ ] weight gain  HEENT: [ ] negative [ ] dry eyes [ ] eye irritation [ ] postnasal drip [ ] nasal congestion  CV: [ ] negative  [ ] chest pain [ ] orthopnea [ ] palpitations [ ] murmur  Resp: [ ] negative [ ] cough [ ] shortness of breath [ ] dyspnea [ ] wheezing [ ] sputum [ ] hemoptysis  GI: [ ] negative [ ] nausea [ ] vomiting [ ] diarrhea [ ] constipation [ ] abd pain [ ] dysphagia   : [ ] negative [ ] dysuria [ ] nocturia [ ] hematuria [ ] increased urinary frequency  Musculoskeletal: [ ] negative [ ] back pain [ ] myalgias [ ] arthralgias [ ] fracture  Skin: [ ] negative [ ] rash [ ] itch  Neurological: [ ] negative [ ] headache [ ] dizziness [ ] syncope [ ] weakness [ ] numbness  Psychiatric: [ ] negative [ ] anxiety [ ] depression  Endocrine: [ ] negative [ ] diabetes [ ] thyroid problem  Hematologic/Lymphatic: [ ] negative [ ] anemia [ ] bleeding problem  Allergic/Immunologic: [ ] negative [ ] itchy eyes [ ] nasal discharge [ ] hives [ ] angioedema  [ ] All other systems negative  [ ] Unable to assess ROS because ________    OBJECTIVE:  ICU Vital Signs Last 24 Hrs  T(C): 36.8 (29 May 2018 08:00), Max: 37.1 (29 May 2018 06:00)  T(F): 98.2 (29 May 2018 08:00), Max: 98.8 (29 May 2018 06:00)  HR: 79 (29 May 2018 08:00) (63 - 82)  BP: 108/70 (29 May 2018 08:00) (108/70 - 165/96)  BP(mean): --  ABP: --  ABP(mean): --  RR: 18 (29 May 2018 08:00) (16 - 19)  SpO2: 93% (29 May 2018 08:00) (93% - 100%)        05-28 @ 07:01  -  05-29 @ 07:00  --------------------------------------------------------  IN: 2524 mL / OUT: 7050 mL / NET: -4526 mL      CAPILLARY BLOOD GLUCOSE          PHYSICAL EXAM:  General:   HEENT:   Lymph Nodes:  Neck:   Respiratory:   Cardiovascular:   Abdomen:   Extremities:   Skin:   Neurological:  Psychiatry:    HOSPITAL MEDICATIONS:  heparin  Injectable 5000 Unit(s) SubCutaneous every 12 hours    azithromycin   Tablet 500 milliGRAM(s) Oral daily  cefTRIAXone   IVPB 1 Gram(s) IV Intermittent every 24 hours  cefTRIAXone   IVPB        NIFEdipine XL 30 milliGRAM(s) Oral daily        acetaminophen   Tablet. 975 milliGRAM(s) Oral every 6 hours  diphenhydrAMINE   Capsule 25 milliGRAM(s) Oral every 6 hours PRN  ibuprofen  Tablet 600 milliGRAM(s) Oral every 6 hours  magnesium sulfate Infusion 1.5 Gm/Hr IV Continuous <Continuous>  oxyCODONE    IR 5 milliGRAM(s) Oral every 3 hours  oxyCODONE    IR 5 milliGRAM(s) Oral every 4 hours PRN    docusate sodium 100 milliGRAM(s) Oral two times a day PRN  glycerin Suppository - Adult 1 Suppository(s) Rectal at bedtime PRN  magnesium hydroxide Suspension 30 milliLiter(s) Oral two times a day PRN  simethicone 80 milliGRAM(s) Chew every 6 hours PRN      oxytocin Infusion 41.667 milliUNIT(s)/Min IV Continuous <Continuous>  oxytocin Infusion 41.667 milliUNIT(s)/Min IV Continuous <Continuous>    folic acid 0.8 milliGRAM(s) Oral daily  lactated ringers. 1000 milliLiter(s) IV Continuous <Continuous>  prenatal multivitamin 1 Tablet(s) Oral daily  prenatal multivitamin 1 Tablet(s) Oral daily  sodium chloride 0.9% lock flush 3 milliLiter(s) IV Push every 8 hours    diphtheria/tetanus/pertussis (acellular) Vaccine (ADAcel) 0.5 milliLiter(s) IntraMuscular once    dibucaine 1% Ointment 1 Application(s) Topical every 4 hours PRN  hydrocortisone 1% Cream 1 Application(s) Topical every 4 hours PRN  lanolin Ointment 1 Application(s) Topical every 6 hours PRN  pramoxine 1%/zinc 5% Cream 1 Application(s) Topical every 4 hours PRN  sodium chloride 0.65% Nasal 1 Spray(s) Both Nostrils two times a day  witch hazel Pads 1 Application(s) Topical every 4 hours PRN        LABS:                        8.6    15.3  )-----------( 409      ( 29 May 2018 06:24 )             24.1     Hgb Trend: 8.6<--, 9.9<--, 9.5<--, 8.9<--, 9.2<--  05-29    139  |  103  |  8   ----------------------------<  80  4.1   |  23  |  0.68    Ca    7.5<L>      29 May 2018 06:24  Mg     6.5     05-29    TPro  6.5  /  Alb  2.1<L>  /  TBili  2.9<H>  /  DBili  x   /  AST  186<H>  /  ALT  81<H>  /  AlkPhos  223<H>  05-29    Creatinine Trend: 0.68<--, 0.70<--, 0.60<--, 0.58<--, 0.55<--, 0.64<--  PT/INR - ( 29 May 2018 06:24 )   PT: 10.7 sec;   INR: 0.98 ratio         PTT - ( 29 May 2018 06:24 )  PTT:27.1 sec          MICROBIOLOGY:     RADIOLOGY:  [x ] Reviewed and interpreted by me   Patent central airways. No pulmonary nodules.   Cyst in the right upper lobe. Medial left lower lobe consolidation.   Bibasilar compressive atelectasis.  PLEURA: Trace bilateral pleural effusions.  VESSELS: No pulmonary embolism. Aberrant right subclavian artery.  HEART: Mild cardiomegaly. No pericardial effusion.  MEDIASTINUM AND IRIS: No lymphadenopathy.  CHEST WALL AND LOWER NECK: Within normal limits.  VISUALIZED UPPER ABDOMEN: Within normal limits.  BONES: Within normal limits.  IMPRESSION: No pulmonary embolism.  Medial left lower lobe consolidation. Findings can be seen with acute   chest syndrome.      PULMONARY FUNCTION TESTS:    EKG: CHIEF COMPLAINT: dyspnea    Interval Events: patient feeling better. No acute events overnights.    REVIEW OF SYSTEMS:  Constitutional: [x ] negative [ ] fevers [ ] chills [ ] weight loss [ ] weight gain  HEENT: [ x] negative [ ] dry eyes [ ] eye irritation [ ] postnasal drip [ ] nasal congestion  CV: [x ] negative  [ ] chest pain [ ] orthopnea [ ] palpitations [ ] murmur  Resp: [ x] negative [ ] cough [ ] shortness of breath [ ] dyspnea [ ] wheezing [ ] sputum [ ] hemoptysis  GI: [ ] negative [ ] nausea [ ] vomiting [ ] diarrhea [ ] constipation [ ] abd pain [ ] dysphagia   : [ x] negative [ ] dysuria [ ] nocturia [ ] hematuria [ ] increased urinary frequency  Musculoskeletal: [ ] negative [ ] back pain [ ] myalgias [ ] arthralgias [ ] fracture  Skin: [x ] negative [ ] rash [ ] itch  Neurological: [ x] negative [ ] headache [ ] dizziness [ ] syncope [ ] weakness [ ] numbness  Psychiatric: [ ] negative [ ] anxiety [ ] depression  Endocrine: [ ] negative [ ] diabetes [ ] thyroid problem  Hematologic/Lymphatic: [ ] negative [ ] anemia [ ] bleeding problem  Allergic/Immunologic: [ ] negative [ ] itchy eyes [ ] nasal discharge [ ] hives [ ] angioedema  [ ] All other systems negative  [ ] Unable to assess ROS because ________    OBJECTIVE:  T(C): 36.8 (29 May 2018 08:00), Max: 37.1 (29 May 2018 06:00)  T(F): 98.2 (29 May 2018 08:00), Max: 98.8 (29 May 2018 06:00)  HR: 79 (29 May 2018 08:00) (63 - 82)  BP: 108/70 (29 May 2018 08:00) (108/70 - 165/96)  RR: 18 (29 May 2018 08:00) (16 - 19)  SpO2: 93% (29 May 2018 08:00) (93% - 100%)        05-28 @ 07:01  -  05-29 @ 07:00  --------------------------------------------------------  IN: 2524 mL / OUT: 7050 mL / NET: -4526 mL      CAPILLARY BLOOD GLUCOSE          PHYSICAL EXAM:  General: NAD  HEENT: GASPER  Lymph Nodes:  Neck: No JVD  Respiratory: CTA b/l  Cardiovascular: RRR  Abdomen: S/NT/ND  Extremities: -C/C/E  Skin: No rash  Neurological: non- focal.  Psychiatry:    HOSPITAL MEDICATIONS:  heparin  Injectable 5000 Unit(s) SubCutaneous every 12 hours    azithromycin   Tablet 500 milliGRAM(s) Oral daily  cefTRIAXone   IVPB 1 Gram(s) IV Intermittent every 24 hours  cefTRIAXone   IVPB        NIFEdipine XL 30 milliGRAM(s) Oral daily        acetaminophen   Tablet. 975 milliGRAM(s) Oral every 6 hours  diphenhydrAMINE   Capsule 25 milliGRAM(s) Oral every 6 hours PRN  ibuprofen  Tablet 600 milliGRAM(s) Oral every 6 hours  magnesium sulfate Infusion 1.5 Gm/Hr IV Continuous <Continuous>  oxyCODONE    IR 5 milliGRAM(s) Oral every 3 hours  oxyCODONE    IR 5 milliGRAM(s) Oral every 4 hours PRN    docusate sodium 100 milliGRAM(s) Oral two times a day PRN  glycerin Suppository - Adult 1 Suppository(s) Rectal at bedtime PRN  magnesium hydroxide Suspension 30 milliLiter(s) Oral two times a day PRN  simethicone 80 milliGRAM(s) Chew every 6 hours PRN      oxytocin Infusion 41.667 milliUNIT(s)/Min IV Continuous <Continuous>  oxytocin Infusion 41.667 milliUNIT(s)/Min IV Continuous <Continuous>    folic acid 0.8 milliGRAM(s) Oral daily  lactated ringers. 1000 milliLiter(s) IV Continuous <Continuous>  prenatal multivitamin 1 Tablet(s) Oral daily  prenatal multivitamin 1 Tablet(s) Oral daily  sodium chloride 0.9% lock flush 3 milliLiter(s) IV Push every 8 hours    diphtheria/tetanus/pertussis (acellular) Vaccine (ADAcel) 0.5 milliLiter(s) IntraMuscular once    dibucaine 1% Ointment 1 Application(s) Topical every 4 hours PRN  hydrocortisone 1% Cream 1 Application(s) Topical every 4 hours PRN  lanolin Ointment 1 Application(s) Topical every 6 hours PRN  pramoxine 1%/zinc 5% Cream 1 Application(s) Topical every 4 hours PRN  sodium chloride 0.65% Nasal 1 Spray(s) Both Nostrils two times a day  witch hazel Pads 1 Application(s) Topical every 4 hours PRN        LABS:                        8.6    15.3  )-----------( 409      ( 29 May 2018 06:24 )             24.1     Hgb Trend: 8.6<--, 9.9<--, 9.5<--, 8.9<--, 9.2<--  05-29    139  |  103  |  8   ----------------------------<  80  4.1   |  23  |  0.68    Ca    7.5<L>      29 May 2018 06:24  Mg     6.5     05-29    TPro  6.5  /  Alb  2.1<L>  /  TBili  2.9<H>  /  DBili  x   /  AST  186<H>  /  ALT  81<H>  /  AlkPhos  223<H>  05-29    Creatinine Trend: 0.68<--, 0.70<--, 0.60<--, 0.58<--, 0.55<--, 0.64<--  PT/INR - ( 29 May 2018 06:24 )   PT: 10.7 sec;   INR: 0.98 ratio         PTT - ( 29 May 2018 06:24 )  PTT:27.1 sec          MICROBIOLOGY:     RADIOLOGY:  [x ] Reviewed and interpreted by me   Patent central airways. No pulmonary nodules.   Cyst in the right upper lobe. Medial left lower lobe consolidation.   Bibasilar compressive atelectasis.  PLEURA: Trace bilateral pleural effusions.  VESSELS: No pulmonary embolism. Aberrant right subclavian artery.  HEART: Mild cardiomegaly. No pericardial effusion.  MEDIASTINUM AND IRIS: No lymphadenopathy.  CHEST WALL AND LOWER NECK: Within normal limits.  VISUALIZED UPPER ABDOMEN: Within normal limits.  BONES: Within normal limits.  IMPRESSION: No pulmonary embolism.  Medial left lower lobe consolidation. Findings can be seen with acute   chest syndrome.      PULMONARY FUNCTION TESTS:    EKG:

## 2018-05-29 NOTE — PROGRESS NOTE ADULT - SUBJECTIVE AND OBJECTIVE BOX
R3 OB Note    Patient seen and examined at bedside. No acute events overnight. Continues to have severe positional headache, denies visual changes. Denies chest pain and SOB. Pain well controlled with PO motrin, tylenol and oxycodone PRN. Tolerating reg diet, no N/V. Not yet OOB given headache.     Vital Signs Last 24 Hrs  T(C): 37 (29 May 2018 00:00), Max: 37 (29 May 2018 00:00)  T(F): 98.6 (29 May 2018 00:00), Max: 98.6 (29 May 2018 00:00)  HR: 70 (29 May 2018 01:56) (63 - 82)  BP: 131/79 (29 May 2018 01:56) (122/72 - 165/96)  RR: 18 (29 May 2018 01:56) (16 - 18)  SpO2: 95% (29 May 2018 01:56) (94% - 100%) on 3L nasal cannula     Gen: NAD  CV: RRR, LLL rales improved from previous   Abd: soft, gravid, non tender  Ext: nonedematous b/l, venodynes on and functioning       LABS:                        9.9    11.9  )-----------( 506      ( 28 May 2018 11:07 )             26.8     05-28    138  |  103  |  6<L>  ----------------------------<  93  3.7   |  21<L>  |  0.70    Ca    8.3<L>      28 May 2018 11:07  Mg     5.5     05-29  TPro  7.5  /  Alb  2.8<L>  /  TBili  4.1<H>  /  DBili  x   /  AST  165<H>  /  ALT  78<H>  /  AlkPhos  261<H>  05-28  PT/INR - ( 28 May 2018 11:07 )   PT: 10.5 sec;   INR: 0.96 ratio    PTT - ( 28 May 2018 11:07 )  PTT:26.6 sec    MEDICATIONS  (STANDING):  acetaminophen   Tablet. 975 milliGRAM(s) Oral every 6 hours  azithromycin   Tablet 500 milliGRAM(s) Oral daily  cefTRIAXone   IVPB 1 Gram(s) IV Intermittent every 24 hours  cefTRIAXone   IVPB      diphtheria/tetanus/pertussis (acellular) Vaccine (ADAcel) 0.5 milliLiter(s) IntraMuscular once  folic acid 0.8 milliGRAM(s) Oral daily  heparin  Injectable 5000 Unit(s) SubCutaneous every 12 hours  ibuprofen  Tablet 600 milliGRAM(s) Oral every 6 hours  lactated ringers. 1000 milliLiter(s) (50 mL/Hr) IV Continuous <Continuous>  magnesium sulfate Infusion 1.5 Gm/Hr (37.5 mL/Hr) IV Continuous <Continuous>  NIFEdipine XL 30 milliGRAM(s) Oral daily  oxyCODONE    IR 5 milliGRAM(s) Oral every 3 hours  oxytocin Infusion 41.667 milliUNIT(s)/Min (125 mL/Hr) IV Continuous <Continuous>  oxytocin Infusion 41.667 milliUNIT(s)/Min (125 mL/Hr) IV Continuous <Continuous>  prenatal multivitamin 1 Tablet(s) Oral daily  prenatal multivitamin 1 Tablet(s) Oral daily  sodium chloride 0.65% Nasal 1 Spray(s) Both Nostrils two times a day  sodium chloride 0.9% lock flush 3 milliLiter(s) IV Push every 8 hours    MEDICATIONS  (PRN):  dibucaine 1% Ointment 1 Application(s) Topical every 4 hours PRN Perineal Discomfort  diphenhydrAMINE   Capsule 25 milliGRAM(s) Oral every 6 hours PRN Itching  docusate sodium 100 milliGRAM(s) Oral two times a day PRN Stool Softening  glycerin Suppository - Adult 1 Suppository(s) Rectal at bedtime PRN Constipation  hydrocortisone 1% Cream 1 Application(s) Topical every 4 hours PRN Moderate to Severe Perineal Pain  lanolin Ointment 1 Application(s) Topical every 6 hours PRN Sore Nipples  magnesium hydroxide Suspension 30 milliLiter(s) Oral two times a day PRN Constipation  oxyCODONE    IR 5 milliGRAM(s) Oral every 4 hours PRN Severe Pain (7 -10)  pramoxine 1%/zinc 5% Cream 1 Application(s) Topical every 4 hours PRN Moderate to Severe Perineal Pain  simethicone 80 milliGRAM(s) Chew every 6 hours PRN Gas  witch hazel Pads 1 Application(s) Topical every 4 hours PRN Perineal Discomfort R3 OB Note    Patient seen and examined at bedside. No acute events overnight. Continues to have severe positional headache, denies visual changes. Denies chest pain and SOB. Pain well controlled with PO motrin, tylenol and oxycodone PRN. Tolerating reg diet, no N/V. Not yet OOB given headache.     Vital Signs Last 24 Hrs  T(C): 37 (29 May 2018 00:00), Max: 37 (29 May 2018 00:00)  T(F): 98.6 (29 May 2018 00:00), Max: 98.6 (29 May 2018 00:00)  HR: 70 (29 May 2018 01:56) (63 - 82)  BP: 131/79 (29 May 2018 01:56) (122/72 - 165/96)  RR: 18 (29 May 2018 01:56) (16 - 18)  SpO2: 95% (29 May 2018 01:56) (94% - 100%) on 3L nasal cannula     I&O's Summary    27 May 2018 07:01  -  28 May 2018 07:00  --------------------------------------------------------  IN: 2900 mL / OUT: 7250 mL / NET: -4350 mL    28 May 2018 07:01  -  29 May 2018 04:05  --------------------------------------------------------  IN: 1824 mL / OUT: 6050 mL / NET: -4226 mL      Gen: NAD  CV: RRR, LLL rales improved from previous   Abd: soft, gravid, non tender  Ext: nonedematous b/l, venodynes on and functioning       LABS:                        9.9    11.9  )-----------( 506      ( 28 May 2018 11:07 )             26.8     05-28    138  |  103  |  6<L>  ----------------------------<  93  3.7   |  21<L>  |  0.70    Ca    8.3<L>      28 May 2018 11:07  Mg     5.5     05-29  TPro  7.5  /  Alb  2.8<L>  /  TBili  4.1<H>  /  DBili  x   /  AST  165<H>  /  ALT  78<H>  /  AlkPhos  261<H>  05-28  PT/INR - ( 28 May 2018 11:07 )   PT: 10.5 sec;   INR: 0.96 ratio    PTT - ( 28 May 2018 11:07 )  PTT:26.6 sec    MEDICATIONS  (STANDING):  acetaminophen   Tablet. 975 milliGRAM(s) Oral every 6 hours  azithromycin   Tablet 500 milliGRAM(s) Oral daily  cefTRIAXone   IVPB 1 Gram(s) IV Intermittent every 24 hours  cefTRIAXone   IVPB      diphtheria/tetanus/pertussis (acellular) Vaccine (ADAcel) 0.5 milliLiter(s) IntraMuscular once  folic acid 0.8 milliGRAM(s) Oral daily  heparin  Injectable 5000 Unit(s) SubCutaneous every 12 hours  ibuprofen  Tablet 600 milliGRAM(s) Oral every 6 hours  lactated ringers. 1000 milliLiter(s) (50 mL/Hr) IV Continuous <Continuous>  magnesium sulfate Infusion 1.5 Gm/Hr (37.5 mL/Hr) IV Continuous <Continuous>  NIFEdipine XL 30 milliGRAM(s) Oral daily  oxyCODONE    IR 5 milliGRAM(s) Oral every 3 hours  oxytocin Infusion 41.667 milliUNIT(s)/Min (125 mL/Hr) IV Continuous <Continuous>  oxytocin Infusion 41.667 milliUNIT(s)/Min (125 mL/Hr) IV Continuous <Continuous>  prenatal multivitamin 1 Tablet(s) Oral daily  prenatal multivitamin 1 Tablet(s) Oral daily  sodium chloride 0.65% Nasal 1 Spray(s) Both Nostrils two times a day  sodium chloride 0.9% lock flush 3 milliLiter(s) IV Push every 8 hours    MEDICATIONS  (PRN):  dibucaine 1% Ointment 1 Application(s) Topical every 4 hours PRN Perineal Discomfort  diphenhydrAMINE   Capsule 25 milliGRAM(s) Oral every 6 hours PRN Itching  docusate sodium 100 milliGRAM(s) Oral two times a day PRN Stool Softening  glycerin Suppository - Adult 1 Suppository(s) Rectal at bedtime PRN Constipation  hydrocortisone 1% Cream 1 Application(s) Topical every 4 hours PRN Moderate to Severe Perineal Pain  lanolin Ointment 1 Application(s) Topical every 6 hours PRN Sore Nipples  magnesium hydroxide Suspension 30 milliLiter(s) Oral two times a day PRN Constipation  oxyCODONE    IR 5 milliGRAM(s) Oral every 4 hours PRN Severe Pain (7 -10)  pramoxine 1%/zinc 5% Cream 1 Application(s) Topical every 4 hours PRN Moderate to Severe Perineal Pain  simethicone 80 milliGRAM(s) Chew every 6 hours PRN Gas  witch hazel Pads 1 Application(s) Topical every 4 hours PRN Perineal Discomfort

## 2018-05-30 ENCOUNTER — TRANSCRIPTION ENCOUNTER (OUTPATIENT)
Age: 33
End: 2018-05-30

## 2018-05-30 LAB
ALBUMIN SERPL ELPH-MCNC: 2.3 G/DL — LOW (ref 3.3–5)
ALP SERPL-CCNC: 222 U/L — HIGH (ref 40–120)
ALT FLD-CCNC: 91 U/L — HIGH (ref 10–45)
ANION GAP SERPL CALC-SCNC: 11 MMOL/L — SIGNIFICANT CHANGE UP (ref 5–17)
APTT BLD: 27.8 SEC — SIGNIFICANT CHANGE UP (ref 27.5–37.4)
AST SERPL-CCNC: 184 U/L — HIGH (ref 10–40)
BASOPHILS # BLD AUTO: 0 K/UL — SIGNIFICANT CHANGE UP (ref 0–0.2)
BILIRUB SERPL-MCNC: 2.7 MG/DL — HIGH (ref 0.2–1.2)
BUN SERPL-MCNC: 11 MG/DL — SIGNIFICANT CHANGE UP (ref 7–23)
CALCIUM SERPL-MCNC: 8.7 MG/DL — SIGNIFICANT CHANGE UP (ref 8.4–10.5)
CHLORIDE SERPL-SCNC: 102 MMOL/L — SIGNIFICANT CHANGE UP (ref 96–108)
CO2 SERPL-SCNC: 23 MMOL/L — SIGNIFICANT CHANGE UP (ref 22–31)
CREAT SERPL-MCNC: 0.68 MG/DL — SIGNIFICANT CHANGE UP (ref 0.5–1.3)
EOSINOPHIL # BLD AUTO: 0.3 K/UL — SIGNIFICANT CHANGE UP (ref 0–0.5)
EOSINOPHIL NFR BLD AUTO: 2 % — SIGNIFICANT CHANGE UP (ref 0–6)
GIANT PLATELETS BLD QL SMEAR: PRESENT — SIGNIFICANT CHANGE UP
GLUCOSE SERPL-MCNC: 87 MG/DL — SIGNIFICANT CHANGE UP (ref 70–99)
HCT VFR BLD CALC: 23.5 % — LOW (ref 34.5–45)
HGB BLD-MCNC: 8.3 G/DL — LOW (ref 11.5–15.5)
INR BLD: 1.02 RATIO — SIGNIFICANT CHANGE UP (ref 0.88–1.16)
LDH SERPL L TO P-CCNC: 708 U/L — HIGH (ref 50–242)
LG PLATELETS BLD QL AUTO: SLIGHT — SIGNIFICANT CHANGE UP
LYMPHOCYTES # BLD AUTO: 24 % — SIGNIFICANT CHANGE UP (ref 13–44)
LYMPHOCYTES # BLD AUTO: 5.5 K/UL — HIGH (ref 1–3.3)
MCHC RBC-ENTMCNC: 33.5 PG — SIGNIFICANT CHANGE UP (ref 27–34)
MCHC RBC-ENTMCNC: 35.4 GM/DL — SIGNIFICANT CHANGE UP (ref 32–36)
MCV RBC AUTO: 94.4 FL — SIGNIFICANT CHANGE UP (ref 80–100)
MEV IGM SER-ACNC: <20 AU/ML — SIGNIFICANT CHANGE UP (ref 0–19.9)
MONOCYTES # BLD AUTO: 1.4 K/UL — HIGH (ref 0–0.9)
MONOCYTES NFR BLD AUTO: 11 % — SIGNIFICANT CHANGE UP (ref 2–14)
MYELOCYTES NFR BLD: 1 % — HIGH (ref 0–0)
NEUTROPHILS # BLD AUTO: 8.4 K/UL — HIGH (ref 1.8–7.4)
NEUTROPHILS NFR BLD AUTO: 59 % — SIGNIFICANT CHANGE UP (ref 43–77)
NEUTS BAND # BLD: 2 % — SIGNIFICANT CHANGE UP (ref 0–8)
NRBC # BLD: 22 /100 — HIGH (ref 0–0)
PLAT MORPH BLD: ABNORMAL
PLATELET # BLD AUTO: 482 K/UL — HIGH (ref 150–400)
POTASSIUM SERPL-MCNC: 4 MMOL/L — SIGNIFICANT CHANGE UP (ref 3.5–5.3)
POTASSIUM SERPL-SCNC: 4 MMOL/L — SIGNIFICANT CHANGE UP (ref 3.5–5.3)
PROT SERPL-MCNC: 6.6 G/DL — SIGNIFICANT CHANGE UP (ref 6–8.3)
PROTHROM AB SERPL-ACNC: 11 SEC — SIGNIFICANT CHANGE UP (ref 9.8–12.7)
RBC # BLD: 2.49 M/UL — LOW (ref 3.8–5.2)
RBC # FLD: 19.5 % — HIGH (ref 10.3–14.5)
RBC BLD AUTO: SIGNIFICANT CHANGE UP
SODIUM SERPL-SCNC: 136 MMOL/L — SIGNIFICANT CHANGE UP (ref 135–145)
URATE SERPL-MCNC: 7.9 MG/DL — HIGH (ref 2.5–7)
VARIANT LYMPHS # BLD: 1 % — SIGNIFICANT CHANGE UP (ref 0–6)
WBC # BLD: 15.6 K/UL — HIGH (ref 3.8–10.5)
WBC # FLD AUTO: 15.6 K/UL — HIGH (ref 3.8–10.5)

## 2018-05-30 PROCEDURE — 93306 TTE W/DOPPLER COMPLETE: CPT | Mod: 26

## 2018-05-30 RX ORDER — NIFEDIPINE 30 MG
1 TABLET, EXTENDED RELEASE 24 HR ORAL
Qty: 30 | Refills: 0
Start: 2018-05-30 | End: 2018-06-28

## 2018-05-30 RX ORDER — TETANUS TOXOID, REDUCED DIPHTHERIA TOXOID AND ACELLULAR PERTUSSIS VACCINE, ADSORBED 5; 2.5; 8; 8; 2.5 [IU]/.5ML; [IU]/.5ML; UG/.5ML; UG/.5ML; UG/.5ML
0.5 SUSPENSION INTRAMUSCULAR ONCE
Refills: 0 | Status: COMPLETED | OUTPATIENT
Start: 2018-05-30 | End: 2018-05-30

## 2018-05-30 RX ORDER — ACETAMINOPHEN 500 MG
3 TABLET ORAL
Qty: 0 | Refills: 0 | DISCHARGE
Start: 2018-05-30

## 2018-05-30 RX ORDER — IBUPROFEN 200 MG
1 TABLET ORAL
Qty: 0 | Refills: 0 | DISCHARGE
Start: 2018-05-30

## 2018-05-30 RX ADMIN — Medication 1 TABLET(S): at 12:29

## 2018-05-30 RX ADMIN — TETANUS TOXOID, REDUCED DIPHTHERIA TOXOID AND ACELLULAR PERTUSSIS VACCINE, ADSORBED 0.5 MILLILITER(S): 5; 2.5; 8; 8; 2.5 SUSPENSION INTRAMUSCULAR at 06:02

## 2018-05-30 RX ADMIN — Medication 600 MILLIGRAM(S): at 12:58

## 2018-05-30 RX ADMIN — Medication 600 MILLIGRAM(S): at 19:13

## 2018-05-30 RX ADMIN — Medication 30 MILLIGRAM(S): at 12:47

## 2018-05-30 RX ADMIN — Medication 0.8 MILLIGRAM(S): at 12:18

## 2018-05-30 RX ADMIN — SODIUM CHLORIDE 3 MILLILITER(S): 9 INJECTION INTRAMUSCULAR; INTRAVENOUS; SUBCUTANEOUS at 05:09

## 2018-05-30 RX ADMIN — Medication 600 MILLIGRAM(S): at 12:18

## 2018-05-30 RX ADMIN — SODIUM CHLORIDE 3 MILLILITER(S): 9 INJECTION INTRAMUSCULAR; INTRAVENOUS; SUBCUTANEOUS at 16:17

## 2018-05-30 RX ADMIN — Medication 1 TABLET(S): at 19:12

## 2018-05-30 NOTE — PROGRESS NOTE ADULT - ATTENDING COMMENTS
Ms Becerra continues to improve clinically.  Important that she have echo as she has an enlarged PA and LV function did not appear entirely normal by our bedside exam.  Will make further recommendations after echocardiogram.

## 2018-05-30 NOTE — DISCHARGE NOTE OB - MEDICATION SUMMARY - MEDICATIONS TO TAKE
I will START or STAY ON the medications listed below when I get home from the hospital:    acetaminophen 325 mg oral tablet  -- 3 tab(s) by mouth every 6 hours  -- Indication: For Postpartum pain    ibuprofen 600 mg oral tablet  -- 1 tab(s) by mouth every 6 hours  -- Indication: For Postpartum pain    NIFEdipine 30 mg oral tablet, extended release  -- 1 tab(s) by mouth once a day  -- Indication: For Preeclampsia    Prenatal Multivitamins with Folic Acid 1 mg oral tablet  -- 1 tab(s) by mouth once a day  -- Indication: For Postpartum    amoxicillin-clavulanate 500 mg-125 mg oral tablet  -- 1 tab(s) by mouth every 12 hours for pneumonia  -- Indication: For Pneumonia

## 2018-05-30 NOTE — DIETITIAN INITIAL EVALUATION ADULT. - OTHER INFO
Pt seen for length of stay. Pt is a  s/p IOL at 33 weeks on  for pSEC in setting of vaso-occlusive crisis. Pt reports back pain at epidural site and mild cramping, denies other GI distress. +BM since delivery. Good appetite, eating >75% at meals. Plans on trying to pump today.

## 2018-05-30 NOTE — DISCHARGE NOTE OB - COMMUNITY RESOURCES
[facility] [employer] [facility]-[phone]

## 2018-05-30 NOTE — DISCHARGE NOTE OB - CARE PLAN
Principal Discharge DX:	Sickle cell crisis  Goal:	wellness  Assessment and plan of treatment:	Follow up with your ObGyn in two days for blood pressure check.  Take home blood pressures daily, call for pressures with systolic over 150 or diastolic over 90.  If you do not have a blood pressure cuff at home either purchase one or go to a local pharmacy with a  BP cuff you can use.    If you have headaches that do not go away with tylenol, pain in your right upper abdomen, notable increase in edema (swelling) of your legs or hands, or visual changes please call your ObGyn.    After discharge, please stay on pelvic rest for 6 weeks, meaning no sexual intercourse, no tampons and no douching.  No driving for 2 weeks as women can loose a lot of blood during delivery and there is a possibility of being lightheaded/fainting.  No lifting objects heavier than baby for two weeks.  Expect to have vaginal bleeding/spotting for up to six weeks.  The bleeding should get lighter and more white/light brown with time.  For bleeding soaking more than a pad an hour or passing clots greater than the size of your fist, come in to the emergency department.    Please follow up with your PCP and hematologist for management of your sickle cell disease.  Secondary Diagnosis:	Pneumonia due to infectious organism, unspecified laterality, unspecified part of lung  Secondary Diagnosis:	Severe pre-eclampsia, antepartum  Secondary Diagnosis:	Vaginal delivery Principal Discharge DX:	Sickle cell crisis  Goal:	wellness  Assessment and plan of treatment:	Please follow up with your PCP and hematologist for management of your sickle cell disease. Take tylenol as needed for pain control.  Secondary Diagnosis:	Pneumonia due to infectious organism, unspecified laterality, unspecified part of lung  Assessment and plan of treatment:	Complete the 7 day course of Augmentin as prescribed.  Secondary Diagnosis:	Severe pre-eclampsia, antepartum  Assessment and plan of treatment:	Follow up with your ObGyn at [facility] in two days for blood pressure check. They are open daily at 1pm with open clinic. Take home blood pressures daily, call for pressures with systolic over 150 or diastolic over 90.  If you do not have a blood pressure cuff at home either purchase one or go to a local pharmacy with a  BP cuff you can use.    If you have headaches that do not go away with tylenol, pain in your right upper abdomen, notable increase in edema (swelling) of your legs or hands, or visual changes please call your ObGyn.    After discharge, please stay on pelvic rest for 6 weeks, meaning no sexual intercourse, no tampons and no douching.  No driving for 2 weeks as women can loose a lot of blood during delivery and there is a possibility of being lightheaded/fainting.  No lifting objects heavier than baby for two weeks.  Expect to have vaginal bleeding/spotting for up to six weeks.  The bleeding should get lighter and more white/light brown with time.  For bleeding soaking more than a pad an hour or passing clots greater than the size of your fist, come in to the emergency department. Follow up with the [facility] in 6 weeks for postpartum check.  Secondary Diagnosis:	Vaginal delivery

## 2018-05-30 NOTE — DISCHARGE NOTE OB - PATIENT PORTAL LINK FT
You can access the [facility], offered by [facility], by registering with the following website: [email or web address]

## 2018-05-30 NOTE — PROGRESS NOTE ADULT - ASSESSMENT
33 year old female with PMH SCD, 33 weeks gestation, who was transferred from [facility] for concern for vaso-occlusive crisis and pre-eclampsia w/ HELLP,  s/p vaginal delivery 3 days ago, and hypoxic respiratory failure 2/2 LLL PNA     - Patient now off supplemental oxygen and feeling back to baseline. Headaches improved.  - Ambulate ad rene given patient refusal for DVT PPx.  - Please try to obtain TTE before patient is discharged.  - Augmentin for 7 days total.  - F/u for HELLP/Pre-eclampsia as per OB.  - She may f/u with her hematologist for her sickle cell vaso-occlusive crisis. Her hemoglobin is stable and LDH is decreasing.   - May f/u with pulmonary clinic at [address] [place] [address] [phone] as outpatient.

## 2018-05-30 NOTE — PROGRESS NOTE ADULT - SUBJECTIVE AND OBJECTIVE BOX
R3 OB Note    Patient seen and examined at bedside. No acute events overnight. Headache markedly improved. Patient able to ambulate without difficulty. Denies visual changes. Denies chest pain and SOB. Pain well controlled with PO motrin, tylenol and Dilaudid PRN. Tolerating reg diet, no N/V. Voiding spontaneously.    Vital Signs Last 24 Hrs  T(C): 37.4 (30 May 2018 00:54), Max: 37.4 (30 May 2018 00:54)  T(F): 99.3 (30 May 2018 00:54), Max: 99.3 (30 May 2018 00:54)  HR: 75 (30 May 2018 00:54) (73 - 85)  BP: 121/70 (30 May 2018 00:54) (108/70 - 144/84)  RR: 18 (30 May 2018 00:54) (18 - 18)  SpO2: 96% (30 May 2018 00:54) (93% - 96%) on room air     Gen: NAD  CV: RRR, LLL with mild crackles  Abd: soft, gravid, non tender  Ext: nonedematous b/l, NTTP bilaterally         LABS:                        8.6    15.3  )-----------( 409      ( 29 May 2018 06:24 )             24.1     05-29    139  |  103  |  8   ----------------------------<  80  4.1   |  23  |  0.68    Ca    7.5<L>      29 May 2018 06:24  Mg     6.5     05-29    TPro  6.5  /  Alb  2.1<L>  /  TBili  2.9<H>  /  DBili  x   /  AST  186<H>  /  ALT  81<H>  /  AlkPhos  223<H>  05-29      PT/INR - ( 29 May 2018 06:24 )   PT: 10.7 sec;   INR: 0.98 ratio         PTT - ( 29 May 2018 06:24 )  PTT:27.1 sec    MEDICATIONS  (STANDING):  acetaminophen   Tablet. 975 milliGRAM(s) Oral every 6 hours  azithromycin   Tablet 500 milliGRAM(s) Oral daily  cefTRIAXone   IVPB 1 Gram(s) IV Intermittent every 24 hours  cefTRIAXone   IVPB      diphtheria/tetanus/pertussis (acellular) Vaccine (ADAcel) 0.5 milliLiter(s) IntraMuscular once  folic acid 0.8 milliGRAM(s) Oral daily  heparin  Injectable 5000 Unit(s) SubCutaneous every 12 hours  ibuprofen  Tablet 600 milliGRAM(s) Oral every 6 hours  lactated ringers. 1000 milliLiter(s) (50 mL/Hr) IV Continuous <Continuous>  NIFEdipine XL 30 milliGRAM(s) Oral daily  oxyCODONE    IR 5 milliGRAM(s) Oral every 3 hours  oxytocin Infusion 41.667 milliUNIT(s)/Min (125 mL/Hr) IV Continuous <Continuous>  oxytocin Infusion 41.667 milliUNIT(s)/Min (125 mL/Hr) IV Continuous <Continuous>  prenatal multivitamin 1 Tablet(s) Oral daily  prenatal multivitamin 1 Tablet(s) Oral daily  sodium chloride 0.65% Nasal 1 Spray(s) Both Nostrils two times a day  sodium chloride 0.9% lock flush 3 milliLiter(s) IV Push every 8 hours    MEDICATIONS  (PRN):  dibucaine 1% Ointment 1 Application(s) Topical every 4 hours PRN Perineal Discomfort  diphenhydrAMINE   Capsule 25 milliGRAM(s) Oral every 6 hours PRN Itching  docusate sodium 100 milliGRAM(s) Oral two times a day PRN Stool Softening  glycerin Suppository - Adult 1 Suppository(s) Rectal at bedtime PRN Constipation  hydrocortisone 1% Cream 1 Application(s) Topical every 4 hours PRN Moderate to Severe Perineal Pain  lanolin Ointment 1 Application(s) Topical every 6 hours PRN Sore Nipples  magnesium hydroxide Suspension 30 milliLiter(s) Oral two times a day PRN Constipation  oxyCODONE    IR 5 milliGRAM(s) Oral every 4 hours PRN Severe Pain (7 -10)  pramoxine 1%/zinc 5% Cream 1 Application(s) Topical every 4 hours PRN Moderate to Severe Perineal Pain  simethicone 80 milliGRAM(s) Chew every 6 hours PRN Gas  witch hazel Pads 1 Application(s) Topical every 4 hours PRN Perineal Discomfort

## 2018-05-30 NOTE — LACTATION INITIAL EVALUATION - INTERVENTION OUTCOME
Pumping guidelines to establish supply; premature breastfeeding guidelines; skin-to-skin daily/verbalizes understanding/demonstrates understanding of teaching/good return demonstration/needs met

## 2018-05-30 NOTE — DISCHARGE NOTE OB - PROVIDER TOKENS
FREE:[LAST:[-],PHONE:[(   )    -],FAX:[(   )    -],ADDRESS:[Please follow up with us in 2 days for a blood pressure check.    Your postpartum visit will be at [address] [place] [address], 2nd floor.    Please schedule an appointment (PHONE #  [phone] )]] FREE:[LAST:[[facility]],PHONE:[phone]],FAX:[(   )    -],ADDRESS:[Ext: [address] [place] [address]]]

## 2018-05-30 NOTE — DISCHARGE NOTE OB - PLAN OF CARE
wellness Follow up with your ObGyn in two days for blood pressure check.  Take home blood pressures daily, call for pressures with systolic over 150 or diastolic over 90.  If you do not have a blood pressure cuff at home either purchase one or go to a local pharmacy with a  BP cuff you can use.    If you have headaches that do not go away with tylenol, pain in your right upper abdomen, notable increase in edema (swelling) of your legs or hands, or visual changes please call your ObGyn.    After discharge, please stay on pelvic rest for 6 weeks, meaning no sexual intercourse, no tampons and no douching.  No driving for 2 weeks as women can loose a lot of blood during delivery and there is a possibility of being lightheaded/fainting.  No lifting objects heavier than baby for two weeks.  Expect to have vaginal bleeding/spotting for up to six weeks.  The bleeding should get lighter and more white/light brown with time.  For bleeding soaking more than a pad an hour or passing clots greater than the size of your fist, come in to the emergency department.    Please follow up with your PCP and hematologist for management of your sickle cell disease. Please follow up with your PCP and hematologist for management of your sickle cell disease. Take tylenol as needed for pain control. Complete the 7 day course of Augmentin as prescribed. Follow up with your ObGyn at [facility] in two days for blood pressure check. They are open daily at 1pm with open clinic. Take home blood pressures daily, call for pressures with systolic over 150 or diastolic over 90.  If you do not have a blood pressure cuff at home either purchase one or go to a local pharmacy with a  BP cuff you can use.    If you have headaches that do not go away with tylenol, pain in your right upper abdomen, notable increase in edema (swelling) of your legs or hands, or visual changes please call your ObGyn.    After discharge, please stay on pelvic rest for 6 weeks, meaning no sexual intercourse, no tampons and no douching.  No driving for 2 weeks as women can loose a lot of blood during delivery and there is a possibility of being lightheaded/fainting.  No lifting objects heavier than baby for two weeks.  Expect to have vaginal bleeding/spotting for up to six weeks.  The bleeding should get lighter and more white/light brown with time.  For bleeding soaking more than a pad an hour or passing clots greater than the size of your fist, come in to the emergency department. Follow up with the [facility] in 6 weeks for postpartum check.

## 2018-05-30 NOTE — DISCHARGE NOTE OB - SECONDARY DIAGNOSIS.
Pneumonia due to infectious organism, unspecified laterality, unspecified part of lung Severe pre-eclampsia, antepartum Vaginal delivery

## 2018-05-30 NOTE — DISCHARGE NOTE OB - HOSPITAL COURSE
34 yo  transferred from [facility] at 32wk4d for sickle cell crisis and cholestasis of pregnancy.  Pain service and hematology consulted.  Patient hydrated, transfused with 2U PRBC with appropriate increase, given O2 by nasal cannula.  Fetus monitored and monitoring within normal.  Pain improved and sickle cell crisis resolved while inpatient.  Patient with oxygen desaturation, CT angio ruled out for PE however noted to have medial lower lobe consolidation.  Given O2 by nasal cannula, later weaned to room air, Started on Ceftriaxone and Azithromycin for pneumonia.  Pulmonary consulted, recommending ceftriaxone and azithro with transition to Augmentin for discharge. Patient developed preeclampsia with severe feature based on blood pressures, increasing transaminitis (also in part attributed to cholestasis), and was induced.  Started on magnesium, received  procardia for blood pressure control.  Patient had uncomplicated, nonsurgical vaginal delivery.  Please see delivery note for details.  Following 24 hours of magnesium and good pressure control magnesium was discontinued.  Transthoracic echo performed as per pulmonology recommendations for and wnl.  At time of discharge patient's vitals were stable, she was oxygenating well on room air, vaginal bleeding appropriate, and pain well controlled.   Patient to follow up with [facility] in 2 days for blood pressure check.  Sent home on Augmentin for pneumonia and Procardia for preeclampsia with severe features.  Discharge instructions and precautions given.

## 2018-05-30 NOTE — PROGRESS NOTE ADULT - ATTENDING COMMENTS
Pt seen and evaluated. Ms Becerra looks and feels much better. She no longer has a headache and is ambulating and tolerating a regular diet. She is scheduled for a TTE to R/O PP Cardiomyopathy.  Will discuss D/C planning with pulm and margarita today.

## 2018-05-30 NOTE — DISCHARGE NOTE OB - CARE PROVIDER_API CALL
-,   Please follow up with us in 2 days for a blood pressure check.    Your postpartum visit will be at [address] [place] [address], 2nd floor.    Please schedule an appointment (PHONE #  [phone] )  Phone: (   )    -  Fax: (   )    - [facility],   Ext: [address] [place] [address]  Phone: [phone]  Fax: (   )    -

## 2018-05-30 NOTE — DIETITIAN INITIAL EVALUATION ADULT. - NS AS NUTRI INTERV ED CONTENT
Discussed increased nutrient needs for breastfeeding/pumping. discussed nutrient-dense snacks for in-between meals./Purpose of the nutrition education/Priority modifications/Recommended modifications

## 2018-05-30 NOTE — PROGRESS NOTE ADULT - SUBJECTIVE AND OBJECTIVE BOX
CHIEF COMPLAINT:    Interval Events:    REVIEW OF SYSTEMS:  Constitutional: [ ] negative [ ] fevers [ ] chills [ ] weight loss [ ] weight gain  HEENT: [ ] negative [ ] dry eyes [ ] eye irritation [ ] postnasal drip [ ] nasal congestion  CV: [ ] negative  [ ] chest pain [ ] orthopnea [ ] palpitations [ ] murmur  Resp: [ ] negative [ ] cough [ ] shortness of breath [ ] dyspnea [ ] wheezing [ ] sputum [ ] hemoptysis  GI: [ ] negative [ ] nausea [ ] vomiting [ ] diarrhea [ ] constipation [ ] abd pain [ ] dysphagia   : [ ] negative [ ] dysuria [ ] nocturia [ ] hematuria [ ] increased urinary frequency  Musculoskeletal: [ ] negative [ ] back pain [ ] myalgias [ ] arthralgias [ ] fracture  Skin: [ ] negative [ ] rash [ ] itch  Neurological: [ ] negative [ ] headache [ ] dizziness [ ] syncope [ ] weakness [ ] numbness  Psychiatric: [ ] negative [ ] anxiety [ ] depression  Endocrine: [ ] negative [ ] diabetes [ ] thyroid problem  Hematologic/Lymphatic: [ ] negative [ ] anemia [ ] bleeding problem  Allergic/Immunologic: [ ] negative [ ] itchy eyes [ ] nasal discharge [ ] hives [ ] angioedema  [ ] All other systems negative  [ ] Unable to assess ROS because ________    OBJECTIVE:  ICU Vital Signs Last 24 Hrs  T(C): 36.8 (30 May 2018 05:20), Max: 37.4 (30 May 2018 00:54)  T(F): 98.2 (30 May 2018 05:20), Max: 99.3 (30 May 2018 00:54)  HR: 65 (30 May 2018 05:20) (65 - 85)  BP: 140/79 (30 May 2018 05:20) (116/74 - 144/84)  BP(mean): --  ABP: --  ABP(mean): --  RR: 18 (30 May 2018 05:20) (18 - 18)  SpO2: 95% (30 May 2018 05:20) (94% - 96%)        05-29 @ 07:01  -  05-30 @ 07:00  --------------------------------------------------------  IN: 0 mL / OUT: 2750 mL / NET: -2750 mL      CAPILLARY BLOOD GLUCOSE          PHYSICAL EXAM:  General:   HEENT:   Lymph Nodes:  Neck:   Respiratory:   Cardiovascular:   Abdomen:   Extremities:   Skin:   Neurological:  Psychiatry:    HOSPITAL MEDICATIONS:  heparin  Injectable 5000 Unit(s) SubCutaneous every 12 hours    azithromycin   Tablet 500 milliGRAM(s) Oral daily  cefTRIAXone   IVPB 1 Gram(s) IV Intermittent every 24 hours  cefTRIAXone   IVPB        NIFEdipine XL 30 milliGRAM(s) Oral daily        acetaminophen   Tablet. 975 milliGRAM(s) Oral every 6 hours  diphenhydrAMINE   Capsule 25 milliGRAM(s) Oral every 6 hours PRN  ibuprofen  Tablet 600 milliGRAM(s) Oral every 6 hours  oxyCODONE    IR 5 milliGRAM(s) Oral every 3 hours  oxyCODONE    IR 5 milliGRAM(s) Oral every 4 hours PRN    docusate sodium 100 milliGRAM(s) Oral two times a day PRN  glycerin Suppository - Adult 1 Suppository(s) Rectal at bedtime PRN  magnesium hydroxide Suspension 30 milliLiter(s) Oral two times a day PRN  simethicone 80 milliGRAM(s) Chew every 6 hours PRN      oxytocin Infusion 41.667 milliUNIT(s)/Min IV Continuous <Continuous>  oxytocin Infusion 41.667 milliUNIT(s)/Min IV Continuous <Continuous>    folic acid 0.8 milliGRAM(s) Oral daily  lactated ringers. 1000 milliLiter(s) IV Continuous <Continuous>  prenatal multivitamin 1 Tablet(s) Oral daily  prenatal multivitamin 1 Tablet(s) Oral daily  sodium chloride 0.9% lock flush 3 milliLiter(s) IV Push every 8 hours    diphtheria/tetanus/pertussis (acellular) Vaccine (ADAcel) 0.5 milliLiter(s) IntraMuscular once    dibucaine 1% Ointment 1 Application(s) Topical every 4 hours PRN  hydrocortisone 1% Cream 1 Application(s) Topical every 4 hours PRN  lanolin Ointment 1 Application(s) Topical every 6 hours PRN  pramoxine 1%/zinc 5% Cream 1 Application(s) Topical every 4 hours PRN  sodium chloride 0.65% Nasal 1 Spray(s) Both Nostrils two times a day  witch hazel Pads 1 Application(s) Topical every 4 hours PRN        LABS:                        8.3    15.6  )-----------( 482      ( 30 May 2018 07:03 )             23.5     Hgb Trend: 8.3<--, 8.6<--, 9.9<--, 9.5<--, 8.9<--  05-30    136  |  102  |  11  ----------------------------<  87  4.0   |  23  |  0.68    Ca    8.7      30 May 2018 07:03  Mg     6.5     05-29    TPro  6.6  /  Alb  2.3<L>  /  TBili  2.7<H>  /  DBili  x   /  AST  184<H>  /  ALT  91<H>  /  AlkPhos  222<H>  05-30    Creatinine Trend: 0.68<--, 0.68<--, 0.70<--, 0.60<--, 0.58<--, 0.55<--  PT/INR - ( 30 May 2018 07:06 )   PT: 11.0 sec;   INR: 1.02 ratio         PTT - ( 30 May 2018 07:06 )  PTT:27.8 sec          MICROBIOLOGY:     RADIOLOGY:  [ ] Reviewed and interpreted by me    PULMONARY FUNCTION TESTS:    EKG: CHIEF COMPLAINT: none    Interval Events: patient feeling much better. Off supplemental oxygen. No more headache. Mild cough but rarely.    REVIEW OF SYSTEMS:  Constitutional: [x ] negative [ ] fevers [ ] chills [ ] weight loss [ ] weight gain  HEENT: [x ] negative [ ] dry eyes [ ] eye irritation [ ] postnasal drip [ ] nasal congestion  CV: [x ] negative  [ ] chest pain [ ] orthopnea [ ] palpitations [ ] murmur  Resp: [] negative [ x] cough [ ] shortness of breath [ ] dyspnea [ ] wheezing [ ] sputum [ ] hemoptysis  GI: [x ] negative [ ] nausea [ ] vomiting [ ] diarrhea [ ] constipation [ ] abd pain [ ] dysphagia   : [x ] negative [ ] dysuria [ ] nocturia [ ] hematuria [ ] increased urinary frequency  Musculoskeletal: [x ] negative [ ] back pain [ ] myalgias [ ] arthralgias [ ] fracture  Skin: [x ] negative [ ] rash [ ] itch  Neurological: [x ] negative [ ] headache [ ] dizziness [ ] syncope [ ] weakness [ ] numbness  Psychiatric: [x ] negative [ ] anxiety [ ] depression  Endocrine: [ ] negative [ ] diabetes [ ] thyroid problem  Hematologic/Lymphatic: [ ] negative [ ] anemia [ ] bleeding problem  Allergic/Immunologic: [ ] negative [ ] itchy eyes [ ] nasal discharge [ ] hives [ ] angioedema  [ ] All other systems negative  [ ] Unable to assess ROS because ________    OBJECTIVE:  T(C): 36.8 (30 May 2018 05:20), Max: 37.4 (30 May 2018 00:54)  T(F): 98.2 (30 May 2018 05:20), Max: 99.3 (30 May 2018 00:54)  HR: 65 (30 May 2018 05:20) (65 - 85)  BP: 140/79 (30 May 2018 05:20) (116/74 - 144/84)  RR: 18 (30 May 2018 05:20) (18 - 18)  SpO2: 95% (30 May 2018 05:20) (94% - 96%)        05-29 @ 07:01  -  05-30 @ 07:00  --------------------------------------------------------  IN: 0 mL / OUT: 2750 mL / NET: -2750 mL      CAPILLARY BLOOD GLUCOSE          PHYSICAL EXAM:  General: NAD  HEENT: GASPER  Lymph Nodes:  Neck:   Respiratory: CTA b/l. No wheezes or crackles  Cardiovascular: RRR  Abdomen: S/mildly distended but NT.  Extremities: -c/c/e  Skin: no rash  Neurological: non-focal.  Psychiatry:    HOSPITAL MEDICATIONS:  heparin  Injectable 5000 Unit(s) SubCutaneous every 12 hours    azithromycin   Tablet 500 milliGRAM(s) Oral daily  cefTRIAXone   IVPB 1 Gram(s) IV Intermittent every 24 hours  cefTRIAXone   IVPB        NIFEdipine XL 30 milliGRAM(s) Oral daily        acetaminophen   Tablet. 975 milliGRAM(s) Oral every 6 hours  diphenhydrAMINE   Capsule 25 milliGRAM(s) Oral every 6 hours PRN  ibuprofen  Tablet 600 milliGRAM(s) Oral every 6 hours  oxyCODONE    IR 5 milliGRAM(s) Oral every 3 hours  oxyCODONE    IR 5 milliGRAM(s) Oral every 4 hours PRN    docusate sodium 100 milliGRAM(s) Oral two times a day PRN  glycerin Suppository - Adult 1 Suppository(s) Rectal at bedtime PRN  magnesium hydroxide Suspension 30 milliLiter(s) Oral two times a day PRN  simethicone 80 milliGRAM(s) Chew every 6 hours PRN      oxytocin Infusion 41.667 milliUNIT(s)/Min IV Continuous <Continuous>  oxytocin Infusion 41.667 milliUNIT(s)/Min IV Continuous <Continuous>    folic acid 0.8 milliGRAM(s) Oral daily  lactated ringers. 1000 milliLiter(s) IV Continuous <Continuous>  prenatal multivitamin 1 Tablet(s) Oral daily  prenatal multivitamin 1 Tablet(s) Oral daily  sodium chloride 0.9% lock flush 3 milliLiter(s) IV Push every 8 hours    diphtheria/tetanus/pertussis (acellular) Vaccine (ADAcel) 0.5 milliLiter(s) IntraMuscular once    dibucaine 1% Ointment 1 Application(s) Topical every 4 hours PRN  hydrocortisone 1% Cream 1 Application(s) Topical every 4 hours PRN  lanolin Ointment 1 Application(s) Topical every 6 hours PRN  pramoxine 1%/zinc 5% Cream 1 Application(s) Topical every 4 hours PRN  sodium chloride 0.65% Nasal 1 Spray(s) Both Nostrils two times a day  witch hazel Pads 1 Application(s) Topical every 4 hours PRN        LABS:                        8.3    15.6  )-----------( 482      ( 30 May 2018 07:03 )             23.5     Hgb Trend: 8.3<--, 8.6<--, 9.9<--, 9.5<--, 8.9<--  05-30    136  |  102  |  11  ----------------------------<  87  4.0   |  23  |  0.68    Ca    8.7      30 May 2018 07:03  Mg     6.5     05-29    TPro  6.6  /  Alb  2.3<L>  /  TBili  2.7<H>  /  DBili  x   /  AST  184<H>  /  ALT  91<H>  /  AlkPhos  222<H>  05-30    Creatinine Trend: 0.68<--, 0.68<--, 0.70<--, 0.60<--, 0.58<--, 0.55<--  PT/INR - ( 30 May 2018 07:06 )   PT: 11.0 sec;   INR: 1.02 ratio         PTT - ( 30 May 2018 07:06 )  PTT:27.8 sec          MICROBIOLOGY:     RADIOLOGY:  [ ] Reviewed and interpreted by me    PULMONARY FUNCTION TESTS:    EKG:

## 2018-05-31 VITALS
SYSTOLIC BLOOD PRESSURE: 116 MMHG | OXYGEN SATURATION: 99 % | DIASTOLIC BLOOD PRESSURE: 75 MMHG | RESPIRATION RATE: 16 BRPM | HEART RATE: 88 BPM | TEMPERATURE: 98 F

## 2018-05-31 LAB
ALBUMIN SERPL ELPH-MCNC: 2.8 G/DL — LOW (ref 3.3–5)
ALP SERPL-CCNC: 231 U/L — HIGH (ref 40–120)
ALT FLD-CCNC: 89 U/L — HIGH (ref 10–45)
ANION GAP SERPL CALC-SCNC: 13 MMOL/L — SIGNIFICANT CHANGE UP (ref 5–17)
APTT BLD: 28.3 SEC — SIGNIFICANT CHANGE UP (ref 27.5–37.4)
AST SERPL-CCNC: 159 U/L — HIGH (ref 10–40)
BASOPHILS # BLD AUTO: 0.1 K/UL — SIGNIFICANT CHANGE UP (ref 0–0.2)
BILIRUB SERPL-MCNC: 2.9 MG/DL — HIGH (ref 0.2–1.2)
BUN SERPL-MCNC: 9 MG/DL — SIGNIFICANT CHANGE UP (ref 7–23)
CALCIUM SERPL-MCNC: 9.2 MG/DL — SIGNIFICANT CHANGE UP (ref 8.4–10.5)
CHLORIDE SERPL-SCNC: 103 MMOL/L — SIGNIFICANT CHANGE UP (ref 96–108)
CO2 SERPL-SCNC: 21 MMOL/L — LOW (ref 22–31)
CREAT SERPL-MCNC: 0.56 MG/DL — SIGNIFICANT CHANGE UP (ref 0.5–1.3)
EOSINOPHIL # BLD AUTO: 0.4 K/UL — SIGNIFICANT CHANGE UP (ref 0–0.5)
EOSINOPHIL NFR BLD AUTO: 2 % — SIGNIFICANT CHANGE UP (ref 0–6)
GLUCOSE SERPL-MCNC: 87 MG/DL — SIGNIFICANT CHANGE UP (ref 70–99)
HCT VFR BLD CALC: 25.3 % — LOW (ref 34.5–45)
HGB BLD-MCNC: 8.3 G/DL — LOW (ref 11.5–15.5)
INR BLD: 1 RATIO — SIGNIFICANT CHANGE UP (ref 0.88–1.16)
LDH SERPL L TO P-CCNC: 763 U/L — HIGH (ref 50–242)
LYMPHOCYTES # BLD AUTO: 2.8 K/UL — SIGNIFICANT CHANGE UP (ref 1–3.3)
LYMPHOCYTES # BLD AUTO: 25 % — SIGNIFICANT CHANGE UP (ref 13–44)
MCHC RBC-ENTMCNC: 31 PG — SIGNIFICANT CHANGE UP (ref 27–34)
MCHC RBC-ENTMCNC: 32.9 GM/DL — SIGNIFICANT CHANGE UP (ref 32–36)
MCV RBC AUTO: 94.3 FL — SIGNIFICANT CHANGE UP (ref 80–100)
METAMYELOCYTES # FLD: 1 % — HIGH (ref 0–0)
MONOCYTES # BLD AUTO: 1.5 K/UL — HIGH (ref 0–0.9)
MONOCYTES NFR BLD AUTO: 12 % — SIGNIFICANT CHANGE UP (ref 2–14)
MYELOCYTES NFR BLD: 1 % — HIGH (ref 0–0)
NEUTROPHILS # BLD AUTO: 7.5 K/UL — HIGH (ref 1.8–7.4)
NEUTROPHILS NFR BLD AUTO: 58 % — SIGNIFICANT CHANGE UP (ref 43–77)
NRBC # BLD: 26 /100 — HIGH (ref 0–0)
PLAT MORPH BLD: NORMAL — SIGNIFICANT CHANGE UP
PLATELET # BLD AUTO: 533 K/UL — HIGH (ref 150–400)
POTASSIUM SERPL-MCNC: 4.3 MMOL/L — SIGNIFICANT CHANGE UP (ref 3.5–5.3)
POTASSIUM SERPL-SCNC: 4.3 MMOL/L — SIGNIFICANT CHANGE UP (ref 3.5–5.3)
PROMYELOCYTES # FLD: 1 % — HIGH (ref 0–0)
PROT SERPL-MCNC: 6.9 G/DL — SIGNIFICANT CHANGE UP (ref 6–8.3)
PROTHROM AB SERPL-ACNC: 10.9 SEC — SIGNIFICANT CHANGE UP (ref 9.8–12.7)
RBC # BLD: 2.69 M/UL — LOW (ref 3.8–5.2)
RBC # FLD: 20.6 % — HIGH (ref 10.3–14.5)
RBC BLD AUTO: SIGNIFICANT CHANGE UP
SODIUM SERPL-SCNC: 137 MMOL/L — SIGNIFICANT CHANGE UP (ref 135–145)
URATE SERPL-MCNC: 6.6 MG/DL — SIGNIFICANT CHANGE UP (ref 2.5–7)
WBC # BLD: 12.3 K/UL — HIGH (ref 3.8–10.5)
WBC # FLD AUTO: 12.3 K/UL — HIGH (ref 3.8–10.5)

## 2018-05-31 PROCEDURE — 59050 FETAL MONITOR W/REPORT: CPT

## 2018-05-31 PROCEDURE — 83010 ASSAY OF HAPTOGLOBIN QUANT: CPT

## 2018-05-31 PROCEDURE — 71275 CT ANGIOGRAPHY CHEST: CPT

## 2018-05-31 PROCEDURE — 85730 THROMBOPLASTIN TIME PARTIAL: CPT

## 2018-05-31 PROCEDURE — 84550 ASSAY OF BLOOD/URIC ACID: CPT

## 2018-05-31 PROCEDURE — 85045 AUTOMATED RETICULOCYTE COUNT: CPT

## 2018-05-31 PROCEDURE — 88307 TISSUE EXAM BY PATHOLOGIST: CPT

## 2018-05-31 PROCEDURE — 86901 BLOOD TYPING SEROLOGIC RH(D): CPT

## 2018-05-31 PROCEDURE — 85384 FIBRINOGEN ACTIVITY: CPT

## 2018-05-31 PROCEDURE — 87653 STREP B DNA AMP PROBE: CPT

## 2018-05-31 PROCEDURE — 87798 DETECT AGENT NOS DNA AMP: CPT

## 2018-05-31 PROCEDURE — 87486 CHLMYD PNEUM DNA AMP PROBE: CPT

## 2018-05-31 PROCEDURE — 81001 URINALYSIS AUTO W/SCOPE: CPT

## 2018-05-31 PROCEDURE — 59025 FETAL NON-STRESS TEST: CPT

## 2018-05-31 PROCEDURE — 87581 M.PNEUMON DNA AMP PROBE: CPT

## 2018-05-31 PROCEDURE — P9016: CPT

## 2018-05-31 PROCEDURE — 86900 BLOOD TYPING SEROLOGIC ABO: CPT

## 2018-05-31 PROCEDURE — 85660 RBC SICKLE CELL TEST: CPT

## 2018-05-31 PROCEDURE — 93306 TTE W/DOPPLER COMPLETE: CPT

## 2018-05-31 PROCEDURE — 87389 HIV-1 AG W/HIV-1&-2 AB AG IA: CPT

## 2018-05-31 PROCEDURE — 76700 US EXAM ABDOM COMPLETE: CPT

## 2018-05-31 PROCEDURE — 84156 ASSAY OF PROTEIN URINE: CPT

## 2018-05-31 PROCEDURE — 82239 BILE ACIDS TOTAL: CPT

## 2018-05-31 PROCEDURE — 85610 PROTHROMBIN TIME: CPT

## 2018-05-31 PROCEDURE — 86762 RUBELLA ANTIBODY: CPT

## 2018-05-31 PROCEDURE — 86923 COMPATIBILITY TEST ELECTRIC: CPT

## 2018-05-31 PROCEDURE — 83615 LACTATE (LD) (LDH) ENZYME: CPT

## 2018-05-31 PROCEDURE — 90715 TDAP VACCINE 7 YRS/> IM: CPT

## 2018-05-31 PROCEDURE — 36430 TRANSFUSION BLD/BLD COMPNT: CPT

## 2018-05-31 PROCEDURE — 83735 ASSAY OF MAGNESIUM: CPT

## 2018-05-31 PROCEDURE — 86703 HIV-1/HIV-2 1 RESULT ANTBDY: CPT

## 2018-05-31 PROCEDURE — 87086 URINE CULTURE/COLONY COUNT: CPT

## 2018-05-31 PROCEDURE — 81003 URINALYSIS AUTO W/O SCOPE: CPT

## 2018-05-31 PROCEDURE — 86850 RBC ANTIBODY SCREEN: CPT

## 2018-05-31 PROCEDURE — 86905 BLOOD TYPING RBC ANTIGENS: CPT

## 2018-05-31 PROCEDURE — 85027 COMPLETE CBC AUTOMATED: CPT

## 2018-05-31 PROCEDURE — 87633 RESP VIRUS 12-25 TARGETS: CPT

## 2018-05-31 PROCEDURE — 71046 X-RAY EXAM CHEST 2 VIEWS: CPT

## 2018-05-31 PROCEDURE — 83020 HEMOGLOBIN ELECTROPHORESIS: CPT

## 2018-05-31 PROCEDURE — 86780 TREPONEMA PALLIDUM: CPT

## 2018-05-31 PROCEDURE — 80053 COMPREHEN METABOLIC PANEL: CPT

## 2018-05-31 RX ADMIN — Medication 600 MILLIGRAM(S): at 11:37

## 2018-05-31 RX ADMIN — Medication 1 TABLET(S): at 05:42

## 2018-05-31 RX ADMIN — Medication 1 TABLET(S): at 11:37

## 2018-05-31 RX ADMIN — Medication 600 MILLIGRAM(S): at 05:42

## 2018-05-31 RX ADMIN — Medication 30 MILLIGRAM(S): at 11:37

## 2018-05-31 NOTE — PROGRESS NOTE ADULT - PROVIDER SPECIALTY LIST ADULT
Anesthesia
Heme/Onc
OB
Pain Medicine
Pulmonology
Heme/Onc

## 2018-05-31 NOTE — PROGRESS NOTE ADULT - ATTENDING COMMENTS
Patient seen and examined by me.  Agree with above resident note. doing well without complaints and is looking to be discharged. To be discharged on Augmentin for 7 days per ID. All questions answered. Feeling well without complaints. Desires to followup in our clinic since she will aloso be visiting the baby in NICU. Residents have notified transfer nurse manager to arrange this.

## 2018-05-31 NOTE — PROGRESS NOTE ADULT - ASSESSMENT
33 year old female with PMH SCD, 33 weeks gestation, who was transferred from [facility] for concern for vaso-occlusive crisis and pre-eclampsia w/ HELLP,  s/p vaginal delivery 3 days ago, and hypoxic respiratory failure 2/2 LLL PNA     - Patient clinically stable and is back to baseline.  - TTE with dilated left atrium but normal LV/RV function and no elevated pulmonary pressures. No acute issues at this time.  - Augmentin for 7 days total for PNA.  - F/u for HELLP/Pre-eclampsia as per OB.  - She may f/u with her hematologist at [facility] for her sickle cell vaso-occlusive crisis. Her hemoglobin is stable.  - May f/u with pulmonary clinic at [address] [place] [address] [phone] as outpatient.  - Ok for discharge from pulmonary perspective.

## 2018-05-31 NOTE — PROGRESS NOTE ADULT - SUBJECTIVE AND OBJECTIVE BOX
CHIEF COMPLAINT: dyspnea    Interval Events: Resolved dyspnea, fatigue. Patient feeling back to baseline. No more supplemental oxygen.    REVIEW OF SYSTEMS:  Constitutional: [x ] negative [ ] fevers [ ] chills [ ] weight loss [ ] weight gain  HEENT: [x ] negative [ ] dry eyes [ ] eye irritation [ ] postnasal drip [ ] nasal congestion  CV: [x ] negative  [ ] chest pain [ ] orthopnea [ ] palpitations [ ] murmur  Resp: [x ] negative [ ] cough [ ] shortness of breath [ ] dyspnea [ ] wheezing [ ] sputum [ ] hemoptysis  GI: [ x] negative [ ] nausea [ ] vomiting [ ] diarrhea [ ] constipation [ ] abd pain [ ] dysphagia   : [x ] negative [ ] dysuria [ ] nocturia [ ] hematuria [ ] increased urinary frequency  Musculoskeletal: [ ] negative [ ] back pain [ ] myalgias [ ] arthralgias [ ] fracture  Skin: [x ] negative [ ] rash [ ] itch  Neurological: [x ] negative [ ] headache [ ] dizziness [ ] syncope [ ] weakness [ ] numbness  Psychiatric: [x ] negative [ ] anxiety [ ] depression  Endocrine: [x ] negative [ ] diabetes [ ] thyroid problem  Hematologic/Lymphatic: [ ] negative [ ] anemia [ ] bleeding problem  Allergic/Immunologic: [ ] negative [ ] itchy eyes [ ] nasal discharge [ ] hives [ ] angioedema  [ ] All other systems negative  [ ] Unable to assess ROS because ________    OBJECTIVE:  T(C): 37 (31 May 2018 09:12), Max: 37 (31 May 2018 09:12)  T(F): 98.6 (31 May 2018 09:12), Max: 98.6 (31 May 2018 09:12)  HR: 70 (31 May 2018 09:12) (67 - 90)  BP: 142/82 (31 May 2018 09:12) (116/70 - 142/82)  RR: 18 (31 May 2018 09:12) (18 - 18)  SpO2: 94% (31 May 2018 09:12) (94% - 99%)      CAPILLARY BLOOD GLUCOSE      PHYSICAL EXAM:  General: NAD  HEENT: GASPER  Lymph Nodes:  Neck: No JVD  Respiratory: CTA b/l  Cardiovascular: RRR no m/r/g  Abdomen: S/NT/ND  Extremities: -C/C/E  Skin: No rash  Neurological: non-focal  Psychiatry:    HOSPITAL MEDICATIONS:  heparin  Injectable 5000 Unit(s) SubCutaneous every 12 hours    amoxicillin  500 milliGRAM(s)/clavulanate 1 Tablet(s) Oral every 12 hours    NIFEdipine XL 30 milliGRAM(s) Oral daily        acetaminophen   Tablet. 975 milliGRAM(s) Oral every 6 hours  diphenhydrAMINE   Capsule 25 milliGRAM(s) Oral every 6 hours PRN  ibuprofen  Tablet 600 milliGRAM(s) Oral every 6 hours    docusate sodium 100 milliGRAM(s) Oral two times a day PRN  glycerin Suppository - Adult 1 Suppository(s) Rectal at bedtime PRN  magnesium hydroxide Suspension 30 milliLiter(s) Oral two times a day PRN  simethicone 80 milliGRAM(s) Chew every 6 hours PRN      oxytocin Infusion 41.667 milliUNIT(s)/Min IV Continuous <Continuous>  oxytocin Infusion 41.667 milliUNIT(s)/Min IV Continuous <Continuous>    folic acid 0.8 milliGRAM(s) Oral daily  prenatal multivitamin 1 Tablet(s) Oral daily  prenatal multivitamin 1 Tablet(s) Oral daily  sodium chloride 0.9% lock flush 3 milliLiter(s) IV Push every 8 hours    diphtheria/tetanus/pertussis (acellular) Vaccine (ADAcel) 0.5 milliLiter(s) IntraMuscular once    dibucaine 1% Ointment 1 Application(s) Topical every 4 hours PRN  hydrocortisone 1% Cream 1 Application(s) Topical every 4 hours PRN  lanolin Ointment 1 Application(s) Topical every 6 hours PRN  pramoxine 1%/zinc 5% Cream 1 Application(s) Topical every 4 hours PRN  sodium chloride 0.65% Nasal 1 Spray(s) Both Nostrils two times a day  witch hazel Pads 1 Application(s) Topical every 4 hours PRN        LABS:                        8.3    12.3  )-----------( 533      ( 31 May 2018 06:46 )             25.3     Hgb Trend: 8.3<--, 8.3<--, 8.6<--, 9.9<--, 9.5<--  05-31    137  |  103  |  9   ----------------------------<  87  4.3   |  21<L>  |  0.56    Ca    9.2      31 May 2018 06:46    TPro  6.9  /  Alb  2.8<L>  /  TBili  2.9<H>  /  DBili  x   /  AST  159<H>  /  ALT  89<H>  /  AlkPhos  231<H>  05-31    Creatinine Trend: 0.56<--, 0.68<--, 0.68<--, 0.70<--, 0.60<--, 0.58<--  PT/INR - ( 31 May 2018 06:46 )   PT: 10.9 sec;   INR: 1.00 ratio         PTT - ( 31 May 2018 06:46 )  PTT:28.3 sec          MICROBIOLOGY:     RADIOLOGY:  [x ] Reviewed and interpreted by me  TTE: Mitral Valve: Normal mitral valve. Minimal mitral  regurgitation.  Aortic Valve/Aorta: Normal trileaflet aortic valve. Minimal  aortic regurgitation.  Peak left ventricular outflow tract  gradient equals 6 mm Hg, mean gradient is equal to 2 mm Hg,  LVOT velocity time integral equals 21 cm.  Aortic Root: 3 cm.  Left Atrium: Moderately dilated left atrium.  LA volume  index = 47 cc/m2.  Left Ventricle: Normal left ventricular systolic function.  No segmental wall motion abnormalities. Normal left  ventricular internal dimensions and wall thicknesses.  Right Heart: Normal right atrium. Normal right ventricular  size and function. Normal tricuspid valve. Normal pulmonic  valve.  Pericardium/Pleura: Normal pericardium with no pericardial  effusion.  Hemodynamic: Estimated right atrial pressure is 8 mm Hg.      PULMONARY FUNCTION TESTS:    EKG:

## 2018-05-31 NOTE — PROGRESS NOTE ADULT - SUBJECTIVE AND OBJECTIVE BOX
R3 OB Note    Patient seen and examined at bedside. No acute events overnight. Positional headache resolved. Denies visual changes. Denies chest pain and SOB. Pain well controlled with PO motrin, tylenol and Dilaudid PRN. Tolerating reg diet, no N/V.   Voiding spontaneously.     Vital Signs Last 24 Hrs  T(C): 36.7 (31 May 2018 01:00), Max: 36.9 (30 May 2018 13:14)  T(F): 98.1 (31 May 2018 01:00), Max: 98.5 (30 May 2018 21:40)  HR: 87 (31 May 2018 01:00) (65 - 90)  BP: 128/81 (31 May 2018 01:00) (122/81 - 140/79)  RR: 18 (31 May 2018 01:00) (18 - 18)  SpO2: 99% (31 May 2018 01:00) (94% - 99%) on room air    Gen: NAD  CV: RRR  Lungs: clear bilaterally  Abd: soft, gravid, non tender  Ext: Non tender to palpation, no edema       LABS:                        8.3    15.6  )-----------( 482      ( 30 May 2018 07:03 )             23.5     05-30    136  |  102  |  11  ----------------------------<  87  4.0   |  23  |  0.68    Ca    8.7      30 May 2018 07:03  Mg     6.5     05-29    TPro  6.6  /  Alb  2.3<L>  /  TBili  2.7<H>  /  DBili  x   /  AST  184<H>  /  ALT  91<H>  /  AlkPhos  222<H>  05-30      PT/INR - ( 30 May 2018 07:06 )   PT: 11.0 sec;   INR: 1.02 ratio         PTT - ( 30 May 2018 07:06 )  PTT:27.8 sec      ROCEDURE: Transthoracic echocardiogram with 2-D, M-Mode  and complete spectral and color flow Doppler.  INDICATION: Cardiomyopathy, unspecified (I42.9)  ------------------------------------------------------------------------  Dimensions:    Normal Values:  LA:     4.4    2.0 - 4.0 cm  Ao:     3.0    2.0 - 3.8 cm  SEPTUM: 0.9    0.6 - 1.2 cm  PWT:    0.9    0.6 - 1.1 cm  LVIDd:  4.9    3.0 - 5.6 cm  LVIDs:  3.3    1.8 - 4.0 cm  Derived variables:  LVMI: 101 g/m2  RWT: 0.36  Fractional short: 33 %  EF (Teicholtz): 61 %  ------------------------------------------------------------------------  Observations:  Mitral Valve: Normal mitral valve. Minimal mitral  regurgitation.  Aortic Valve/Aorta: Normal trileaflet aortic valve. Minimal  aortic regurgitation.  Peak left ventricular outflow tract  gradient equals 6 mm Hg, mean gradient is equal to 2 mm Hg,  LVOT velocity time integral [id number] cm.  Aortic Root: 3 cm.  Left Atrium: Moderately dilated left atrium.  LA volume  index = 47 cc/m2.  Left Ventricle: Normal left ventricular systolic function.  No segmental wall motion abnormalities. Normal left  ventricular internal dimensions and wall thicknesses.  Right Heart: Normal right atrium. Normal right ventricular  size and function. Normal tricuspid valve. Normal pulmonic  valve.  Pericardium/Pleura: Normal pericardium with no pericardial  effusion.  Hemodynamic: Estimated right atrial pressure is 8 mm Hg.  ------------------------------------------------------------------------  Conclusions:  1. Normal left ventricular internal dimensions and wall  thicknesses.  2. Normal left ventricular systolic function. No segmental  wallmotion abnormalities.  3. Normal right ventricular size and function.  *** No previous Echo exam.  ------------------------------------------------------------------------  Confirmed on  5/30/2018 - 15:10:38 by Denilson Franco M.D.  ------------------------------------------------------------------------    MEDICATIONS  (STANDING):  acetaminophen   Tablet. 975 milliGRAM(s) Oral every 6 hours  amoxicillin  500 milliGRAM(s)/clavulanate 1 Tablet(s) Oral every 12 hours  diphtheria/tetanus/pertussis (acellular) Vaccine (ADAcel) 0.5 milliLiter(s) IntraMuscular once  folic acid 0.8 milliGRAM(s) Oral daily  heparin  Injectable 5000 Unit(s) SubCutaneous every 12 hours  ibuprofen  Tablet 600 milliGRAM(s) Oral every 6 hours  NIFEdipine XL 30 milliGRAM(s) Oral daily  oxytocin Infusion 41.667 milliUNIT(s)/Min (125 mL/Hr) IV Continuous <Continuous>  oxytocin Infusion 41.667 milliUNIT(s)/Min (125 mL/Hr) IV Continuous <Continuous>  prenatal multivitamin 1 Tablet(s) Oral daily  prenatal multivitamin 1 Tablet(s) Oral daily  sodium chloride 0.65% Nasal 1 Spray(s) Both Nostrils two times a day  sodium chloride 0.9% lock flush 3 milliLiter(s) IV Push every 8 hours    MEDICATIONS  (PRN):  dibucaine 1% Ointment 1 Application(s) Topical every 4 hours PRN Perineal Discomfort  diphenhydrAMINE   Capsule 25 milliGRAM(s) Oral every 6 hours PRN Itching  docusate sodium 100 milliGRAM(s) Oral two times a day PRN Stool Softening  glycerin Suppository - Adult 1 Suppository(s) Rectal at bedtime PRN Constipation  hydrocortisone 1% Cream 1 Application(s) Topical every 4 hours PRN Moderate to Severe Perineal Pain  lanolin Ointment 1 Application(s) Topical every 6 hours PRN Sore Nipples  magnesium hydroxide Suspension 30 milliLiter(s) Oral two times a day PRN Constipation  pramoxine 1%/zinc 5% Cream 1 Application(s) Topical every 4 hours PRN Moderate to Severe Perineal Pain  simethicone 80 milliGRAM(s) Chew every 6 hours PRN Gas  witch hazel Pads 1 Application(s) Topical every 4 hours PRN Perineal Discomfort

## 2018-05-31 NOTE — PROGRESS NOTE ADULT - ASSESSMENT
A/P: 34 y/o  with h/o Sickle Cell (SF) disease s/p IOL at 33 wks for sPEC in setting of vaso-occlusive crisis, PPD #3 s/p      1. Neuro: Patient with ?spinal headache resolved, declined blood patch offered by anesthesia on PPD#2. Pain control with Tylenol, Motrin and Dilaudid PRN.  2. CV: HD stable. sPEC s/p Magnesium sulfate x24 hours for seizure prophylaxis. Procardia 30 XL for BP control. F/U AM HELLP.   TTE wnl with EF 61%  3. Lungs: LLL consolidation. Ceftriaxone (-), Azithromycin (-) transitioned to Augmentin (-) to complete a total 7 days course. Appreciate pulm recs  4. GI:Regular diet  5. : voiding spontaneously  6. Heme: Vaso-occlusive crisis improving. Venodynes for VTE prophylaxis, patient refusing chemoprophylaxis with HSQ  7. ID: Augmentin as above for LLL consolidation    8. Dispo: Eval for d/c home today     N Sample, PGY3

## 2018-06-04 ENCOUNTER — OUTPATIENT (OUTPATIENT)
Dept: OUTPATIENT SERVICES | Facility: HOSPITAL | Age: 33
LOS: 1 days | End: 2018-06-04
Payer: MEDICAID

## 2018-06-04 ENCOUNTER — APPOINTMENT (OUTPATIENT)
Dept: MATERNAL FETAL MEDICINE | Facility: CLINIC | Age: 33
End: 2018-06-04
Payer: MEDICAID

## 2018-06-04 VITALS
WEIGHT: 117 LBS | HEIGHT: 61 IN | SYSTOLIC BLOOD PRESSURE: 120 MMHG | BODY MASS INDEX: 22.09 KG/M2 | DIASTOLIC BLOOD PRESSURE: 70 MMHG

## 2018-06-04 DIAGNOSIS — Z87.59 PERSONAL HISTORY OF OTHER COMPLICATIONS OF PREGNANCY, CHILDBIRTH AND THE PUERPERIUM: ICD-10-CM

## 2018-06-04 PROCEDURE — 99213 OFFICE O/P EST LOW 20 MIN: CPT

## 2018-06-08 ENCOUNTER — APPOINTMENT (OUTPATIENT)
Dept: MATERNAL FETAL MEDICINE | Facility: CLINIC | Age: 33
End: 2018-06-08
Payer: MEDICAID

## 2018-06-25 ENCOUNTER — OUTPATIENT (OUTPATIENT)
Dept: OUTPATIENT SERVICES | Facility: HOSPITAL | Age: 33
LOS: 1 days | End: 2018-06-25
Payer: MEDICAID

## 2018-06-25 ENCOUNTER — APPOINTMENT (OUTPATIENT)
Dept: MATERNAL FETAL MEDICINE | Facility: CLINIC | Age: 33
End: 2018-06-25
Payer: MEDICAID

## 2018-06-25 VITALS
HEIGHT: 61 IN | DIASTOLIC BLOOD PRESSURE: 80 MMHG | BODY MASS INDEX: 22.28 KG/M2 | SYSTOLIC BLOOD PRESSURE: 130 MMHG | WEIGHT: 118 LBS

## 2018-06-25 DIAGNOSIS — R03.0 ELEVATED BLOOD-PRESSURE READING, WITHOUT DIAGNOSIS OF HYPERTENSION: ICD-10-CM

## 2018-06-25 PROCEDURE — G0463: CPT

## 2018-06-25 PROCEDURE — 87491 CHLMYD TRACH DNA AMP PROBE: CPT

## 2018-06-25 PROCEDURE — 99213 OFFICE O/P EST LOW 20 MIN: CPT

## 2018-06-25 PROCEDURE — 87624 HPV HI-RISK TYP POOLED RSLT: CPT

## 2018-06-25 PROCEDURE — 87591 N.GONORRHOEAE DNA AMP PROB: CPT

## 2018-06-26 LAB
C TRACH RRNA SPEC QL NAA+PROBE: SIGNIFICANT CHANGE UP
HPV HIGH+LOW RISK DNA PNL CVX: SIGNIFICANT CHANGE UP
N GONORRHOEA RRNA SPEC QL NAA+PROBE: SIGNIFICANT CHANGE UP
SPECIMEN SOURCE: SIGNIFICANT CHANGE UP

## 2018-06-27 LAB — CYTOLOGY SPEC DOC CYTO: SIGNIFICANT CHANGE UP

## 2018-08-02 DIAGNOSIS — O09.899 SUPERVISION OF OTHER HIGH RISK PREGNANCIES, UNSPECIFIED TRIMESTER: ICD-10-CM

## 2020-11-25 NOTE — DIETITIAN INITIAL EVALUATION ADULT. - ETIOLOGY
[Labia Majora] : normal [Labia Minora] : normal [IUD String] : an IUD string was noted [Normal] : normal [Uterine Adnexae] : normal increased demand for nutrients

## 2021-03-11 NOTE — DISCHARGE NOTE OB - FUNCTIONAL STATUS DATE
[FreeTextEntry1] : He is interested in proceeding with vasectomy. Different surgical techniques and their outcomes were discussed. Risks of the procedure discussed included but were not limited to bleeding, infection, testicular injury or atrophy, failure of the procedure and need for redo procedure, etc. Post-vasectomy pain syndrome was discussed. Also, studies discussing the link between vasectomy and prostate cancer were discussed. Use of local vs. general anesthesia was discussed. The procedure will be scheduled in the near future.  He will call us back to schedule when he is ready. 31-May-2018

## 2022-03-21 NOTE — PROGRESS NOTE ADULT - ASSESSMENT
32 y/o woman who is currently 32 weeks pregnant admitted for VOC.  She has no evidence of ACS though with small crackles at left base, CXR is clear and she is not hypoxic with no chest pain.  Her pain is better controlled on the IV dilaudid.  Continue IV hydration, folic acid.   Agree with transfusion of PRBC at this time.  Patient also has cholestasis of pregnancy, abdominal sonogram is unrevealing. Today she is asymtomatic.    Plan: continue hydration + pain medication + monitor CBC. 34 y/o woman who is currently 32 weeks pregnant admitted for VOC.  She has no evidence of ACS though with small crackles at left base, CXR is clear and she is not hypoxic with no chest pain.  Her pain is better controlled on the IV dilaudid.  Continue IV hydration, folic acid.   Agree with transfusion of PRBC at this time.  Patient also has cholestasis of pregnancy, abdominal sonogram is unrevealing. Today she is asymtomatic.    Plan: continue hydration + pain medication + monitor CBC.  Stable at present can be d/c from Hematology point of view. breath sounds equal/no rales/no rhonchi/no wheezes

## 2023-01-13 NOTE — PROGRESS NOTE ADULT - ASSESSMENT
"I spoke with Annie Oshea and updated pt on results/recommendations from provider.  Pt verbalized understanding.    Pt was curious if we could review the dates of 1/1/23 and 12/25/22.  Pt says that on Christmas morning about 10 am she felt a very strong episode of palpitations.  On New Years Day, she also experienced an elevated HR of 110-140s for several hours.  Pt did report that she had dran several drinks the evening before, but states that she was very mindful to stay hydrated throughout the night and \"didn't even get tipsy\".  She wanted to know what was going on for these 2 specific dates.    Thank you,    Telma Bailey RN  Triage [facility] [place]  01/13/23 08:57 EST      " A/P: 34 y/o  with h/o Sickle Cell (SF) disease s/p IOL at 33 wks for sPEC in setting of vaso-occlusive crisis, PPD #2 s/p    Neuro: ?Spinal headache improved, declined blood patch offered by anesthesia. Consider MRI if headache worsens. Patient with occasional oral paresthesias, nothing to do per neurology. Pain control with Tylenol, Motrin and Dilaudid PRN.  CV: HD stable. sPEC s/p Magnesium sulfate x24 hours for seizure prophylaxis. Procardia 30 XL for BP control. F/U AM HELLP.   TTE ordered to eval cardiac function and ?enlarged heart  Lungs: LLL consolidation. Ceftriaxone (-), Azithromycin (-) for treatment. No longer requiring supplemental O2 to maintain appropriate O2 sat. Will transition to Augmentin upon discharge home for total 7 day course of antibiotics. Appreciate pulm recs  GI: Regular diet  : voiding spontaneously  Heme: Vaso-occlusive crisis improving. Venodynes for VTE prophylaxis, patient refusing chemoprophylaxis with HSQ. Offered once a day Lovenox, patient still declines despite being counseled on increasing risk of VTE in post partum period given Sickle Cell disease and sedentary while in house  ID: Ceftriaxone and Azithromycin as above for LLL consolidation    Dispo: Eval for d/c home today or tomorrow pending discussion with Hematology and Pulmonology     N Sample, PGY3

## 2023-03-01 ENCOUNTER — NON-APPOINTMENT (OUTPATIENT)
Age: 38
End: 2023-03-01

## 2023-03-07 ENCOUNTER — LABORATORY RESULT (OUTPATIENT)
Age: 38
End: 2023-03-07

## 2023-03-07 ENCOUNTER — OUTPATIENT (OUTPATIENT)
Dept: OUTPATIENT SERVICES | Facility: HOSPITAL | Age: 38
LOS: 1 days | End: 2023-03-07
Payer: MEDICAID

## 2023-03-07 ENCOUNTER — APPOINTMENT (OUTPATIENT)
Dept: MATERNAL FETAL MEDICINE | Facility: CLINIC | Age: 38
End: 2023-03-07
Payer: MEDICAID

## 2023-03-07 ENCOUNTER — ASOB RESULT (OUTPATIENT)
Age: 38
End: 2023-03-07

## 2023-03-07 ENCOUNTER — APPOINTMENT (OUTPATIENT)
Dept: ANTEPARTUM | Facility: CLINIC | Age: 38
End: 2023-03-07
Payer: MEDICAID

## 2023-03-07 VITALS
WEIGHT: 132 LBS | OXYGEN SATURATION: 99 % | HEIGHT: 61 IN | DIASTOLIC BLOOD PRESSURE: 70 MMHG | BODY MASS INDEX: 24.92 KG/M2 | SYSTOLIC BLOOD PRESSURE: 102 MMHG | HEART RATE: 84 BPM

## 2023-03-07 DIAGNOSIS — O09.90 SUPERVISION OF HIGH RISK PREGNANCY, UNSPECIFIED, UNSPECIFIED TRIMESTER: ICD-10-CM

## 2023-03-07 DIAGNOSIS — D57.1 SICKLE-CELL DISEASE WITHOUT CRISIS: ICD-10-CM

## 2023-03-07 DIAGNOSIS — R74.01 ELEVATION OF LEVELS OF LIVER TRANSAMINASE LEVELS: ICD-10-CM

## 2023-03-07 DIAGNOSIS — O09.899 SUPERVISION OF OTHER HIGH RISK PREGNANCIES, UNSPECIFIED TRIMESTER: ICD-10-CM

## 2023-03-07 LAB
ALBUMIN SERPL ELPH-MCNC: 3.6 G/DL — SIGNIFICANT CHANGE UP (ref 3.3–5)
ALP SERPL-CCNC: 234 U/L — HIGH (ref 40–120)
ALT FLD-CCNC: 58 U/L — HIGH (ref 10–45)
ANION GAP SERPL CALC-SCNC: 15 MMOL/L — SIGNIFICANT CHANGE UP (ref 5–17)
AST SERPL-CCNC: 87 U/L — HIGH (ref 10–40)
BILIRUB SERPL-MCNC: 1.7 MG/DL — HIGH (ref 0.2–1.2)
BILIRUB UR QL STRIP: NORMAL
BUN SERPL-MCNC: 9 MG/DL — SIGNIFICANT CHANGE UP (ref 7–23)
CALCIUM SERPL-MCNC: 9.8 MG/DL — SIGNIFICANT CHANGE UP (ref 8.4–10.5)
CHLORIDE SERPL-SCNC: 101 MMOL/L — SIGNIFICANT CHANGE UP (ref 96–108)
CO2 SERPL-SCNC: 18 MMOL/L — LOW (ref 22–31)
COLLECTION METHOD: NORMAL
CREAT ?TM UR-MCNC: 69 MG/DL — SIGNIFICANT CHANGE UP
CREAT SERPL-MCNC: 0.55 MG/DL — SIGNIFICANT CHANGE UP (ref 0.5–1.3)
EGFR: 120 ML/MIN/1.73M2 — SIGNIFICANT CHANGE UP
GLUCOSE SERPL-MCNC: 70 MG/DL — SIGNIFICANT CHANGE UP (ref 70–99)
GLUCOSE UR-MCNC: NORMAL
HCG UR QL: 1 EU/DL
HGB UR QL STRIP.AUTO: NORMAL
KETONES UR-MCNC: NORMAL
LDH SERPL L TO P-CCNC: 601 U/L — HIGH (ref 50–242)
LEUKOCYTE ESTERASE UR QL STRIP: NORMAL
NITRITE UR QL STRIP: NORMAL
PH UR STRIP: 6.5
POTASSIUM SERPL-MCNC: 5.2 MMOL/L — SIGNIFICANT CHANGE UP (ref 3.5–5.3)
POTASSIUM SERPL-SCNC: 5.2 MMOL/L — SIGNIFICANT CHANGE UP (ref 3.5–5.3)
PROT ?TM UR-MCNC: 12 MG/DL — SIGNIFICANT CHANGE UP (ref 0–12)
PROT SERPL-MCNC: 7.6 G/DL — SIGNIFICANT CHANGE UP (ref 6–8.3)
PROT UR STRIP-MCNC: NORMAL
PROT/CREAT UR-RTO: 0.2 RATIO — SIGNIFICANT CHANGE UP (ref 0–0.2)
SODIUM SERPL-SCNC: 135 MMOL/L — SIGNIFICANT CHANGE UP (ref 135–145)
SP GR UR STRIP: 1.01
TSH SERPL-MCNC: 3.22 UIU/ML — SIGNIFICANT CHANGE UP (ref 0.27–4.2)
URATE SERPL-MCNC: 5.6 MG/DL — SIGNIFICANT CHANGE UP (ref 2.5–7)

## 2023-03-07 PROCEDURE — 81003 URINALYSIS AUTO W/O SCOPE: CPT

## 2023-03-07 PROCEDURE — 83615 LACTATE (LD) (LDH) ENZYME: CPT

## 2023-03-07 PROCEDURE — 86900 BLOOD TYPING SEROLOGIC ABO: CPT

## 2023-03-07 PROCEDURE — 80053 COMPREHEN METABOLIC PANEL: CPT

## 2023-03-07 PROCEDURE — 82570 ASSAY OF URINE CREATININE: CPT

## 2023-03-07 PROCEDURE — 85025 COMPLETE CBC W/AUTO DIFF WBC: CPT

## 2023-03-07 PROCEDURE — 76805 OB US >/= 14 WKS SNGL FETUS: CPT | Mod: 26

## 2023-03-07 PROCEDURE — G0463: CPT

## 2023-03-07 PROCEDURE — 83655 ASSAY OF LEAD: CPT

## 2023-03-07 PROCEDURE — 87086 URINE CULTURE/COLONY COUNT: CPT

## 2023-03-07 PROCEDURE — 86850 RBC ANTIBODY SCREEN: CPT

## 2023-03-07 PROCEDURE — 84443 ASSAY THYROID STIM HORMONE: CPT

## 2023-03-07 PROCEDURE — 83789 MASS SPECTROMETRY QUAL/QUAN: CPT

## 2023-03-07 PROCEDURE — 84550 ASSAY OF BLOOD/URIC ACID: CPT

## 2023-03-07 PROCEDURE — 86706 HEP B SURFACE ANTIBODY: CPT

## 2023-03-07 PROCEDURE — 86901 BLOOD TYPING SEROLOGIC RH(D): CPT

## 2023-03-07 PROCEDURE — 80074 ACUTE HEPATITIS PANEL: CPT

## 2023-03-07 PROCEDURE — 99203 OFFICE O/P NEW LOW 30 MIN: CPT | Mod: GC,25

## 2023-03-07 PROCEDURE — 84156 ASSAY OF PROTEIN URINE: CPT

## 2023-03-07 PROCEDURE — 82239 BILE ACIDS TOTAL: CPT

## 2023-03-08 LAB
ANISOCYTOSIS BLD QL: SIGNIFICANT CHANGE UP
BASOPHILS # BLD AUTO: 0.09 K/UL — SIGNIFICANT CHANGE UP (ref 0–0.2)
BASOPHILS NFR BLD AUTO: 0.5 % — SIGNIFICANT CHANGE UP (ref 0–2)
CULTURE RESULTS: SIGNIFICANT CHANGE UP
ELLIPTOCYTES BLD QL SMEAR: SLIGHT — SIGNIFICANT CHANGE UP
EOSINOPHIL # BLD AUTO: 0.43 K/UL — SIGNIFICANT CHANGE UP (ref 0–0.5)
EOSINOPHIL NFR BLD AUTO: 2.6 % — SIGNIFICANT CHANGE UP (ref 0–6)
HAV IGM SER-ACNC: SIGNIFICANT CHANGE UP
HBV CORE IGM SER-ACNC: SIGNIFICANT CHANGE UP
HBV SURFACE AB SER-ACNC: REACTIVE
HBV SURFACE AG SER-ACNC: SIGNIFICANT CHANGE UP
HCT VFR BLD CALC: 24.3 % — LOW (ref 34.5–45)
HCV AB S/CO SERPL IA: 0.05 S/CO — SIGNIFICANT CHANGE UP (ref 0–0.99)
HCV AB SERPL-IMP: SIGNIFICANT CHANGE UP
HGB BLD-MCNC: 8.1 G/DL — LOW (ref 11.5–15.5)
HOWELL-JOLLY BOD BLD QL SMEAR: PRESENT — SIGNIFICANT CHANGE UP
IMM GRANULOCYTES NFR BLD AUTO: 1 % — HIGH (ref 0–0.9)
LEAD BLD-MCNC: <1 UG/DL — SIGNIFICANT CHANGE UP (ref 0–3.4)
LYMPHOCYTES # BLD AUTO: 26.5 % — SIGNIFICANT CHANGE UP (ref 13–44)
LYMPHOCYTES # BLD AUTO: 4.37 K/UL — HIGH (ref 1–3.3)
MACROCYTES BLD QL: SLIGHT — SIGNIFICANT CHANGE UP
MANUAL SMEAR VERIFICATION: SIGNIFICANT CHANGE UP
MCHC RBC-ENTMCNC: 33.1 PG — SIGNIFICANT CHANGE UP (ref 27–34)
MCHC RBC-ENTMCNC: 33.3 GM/DL — SIGNIFICANT CHANGE UP (ref 32–36)
MCV RBC AUTO: 99.2 FL — SIGNIFICANT CHANGE UP (ref 80–100)
MICROCYTES BLD QL: SLIGHT — SIGNIFICANT CHANGE UP
MONOCYTES # BLD AUTO: 1.56 K/UL — HIGH (ref 0–0.9)
MONOCYTES NFR BLD AUTO: 9.5 % — SIGNIFICANT CHANGE UP (ref 2–14)
NEUTROPHILS # BLD AUTO: 9.88 K/UL — HIGH (ref 1.8–7.4)
NEUTROPHILS NFR BLD AUTO: 59.9 % — SIGNIFICANT CHANGE UP (ref 43–77)
NRBC # BLD: 2 /100 WBCS — HIGH (ref 0–0)
PLAT MORPH BLD: NORMAL — SIGNIFICANT CHANGE UP
PLATELET # BLD AUTO: 538 K/UL — HIGH (ref 150–400)
POIKILOCYTOSIS BLD QL AUTO: SIGNIFICANT CHANGE UP
POLYCHROMASIA BLD QL SMEAR: SIGNIFICANT CHANGE UP
RBC # BLD: 2.45 M/UL — LOW (ref 3.8–5.2)
RBC # FLD: 19.2 % — HIGH (ref 10.3–14.5)
RBC BLD AUTO: ABNORMAL
SICKLE CELLS BLD QL SMEAR: SLIGHT — SIGNIFICANT CHANGE UP
SPECIMEN SOURCE: SIGNIFICANT CHANGE UP
TARGETS BLD QL SMEAR: SLIGHT — SIGNIFICANT CHANGE UP
WBC # BLD: 16.5 K/UL — HIGH (ref 3.8–10.5)
WBC # FLD AUTO: 16.5 K/UL — HIGH (ref 3.8–10.5)

## 2023-03-09 ENCOUNTER — NON-APPOINTMENT (OUTPATIENT)
Age: 38
End: 2023-03-09

## 2023-03-09 ENCOUNTER — ASOB RESULT (OUTPATIENT)
Age: 38
End: 2023-03-09

## 2023-03-10 ENCOUNTER — APPOINTMENT (OUTPATIENT)
Dept: MATERNAL FETAL MEDICINE | Facility: CLINIC | Age: 38
End: 2023-03-10
Payer: MEDICAID

## 2023-03-10 LAB — BILE AC FLD-MCNC: 13 UMOL/L — HIGH (ref 0–10)

## 2023-03-10 PROCEDURE — 99442: CPT | Mod: 95

## 2023-03-14 RX ORDER — URSODIOL 300 MG/1
300 CAPSULE ORAL
Qty: 60 | Refills: 4 | Status: ACTIVE | COMMUNITY
Start: 2023-03-14 | End: 1900-01-01

## 2023-03-16 ENCOUNTER — NON-APPOINTMENT (OUTPATIENT)
Age: 38
End: 2023-03-16

## 2023-03-17 LAB
BILE AC SERPL-SCNC: 14 UMOL/L — HIGH
CDCAE SER-MCNC: 4.7 UMOL/L — SIGNIFICANT CHANGE UP
CHOLATE SER-MCNC: 7.3 UMOL/L — HIGH
DO-CHOLATE SER-MCNC: 1.6 UMOL/L — SIGNIFICANT CHANGE UP
URSODEOXYCHOLATE SERPL-SCNC: <0.1 UMOL/L — SIGNIFICANT CHANGE UP

## 2023-03-21 ENCOUNTER — OUTPATIENT (OUTPATIENT)
Dept: OUTPATIENT SERVICES | Facility: HOSPITAL | Age: 38
LOS: 1 days | End: 2023-03-21
Payer: MEDICAID

## 2023-03-21 ENCOUNTER — APPOINTMENT (OUTPATIENT)
Dept: MATERNAL FETAL MEDICINE | Facility: CLINIC | Age: 38
End: 2023-03-21
Payer: MEDICAID

## 2023-03-21 VITALS
HEART RATE: 83 BPM | WEIGHT: 138 LBS | BODY MASS INDEX: 26.06 KG/M2 | OXYGEN SATURATION: 96 % | HEIGHT: 61 IN | SYSTOLIC BLOOD PRESSURE: 120 MMHG | DIASTOLIC BLOOD PRESSURE: 70 MMHG

## 2023-03-21 DIAGNOSIS — O09.899 SUPERVISION OF OTHER HIGH RISK PREGNANCIES, UNSPECIFIED TRIMESTER: ICD-10-CM

## 2023-03-21 LAB
BILIRUB UR QL STRIP: NORMAL
GLUCOSE UR-MCNC: NORMAL
HCG UR QL: 4 EU/DL
HGB UR QL STRIP.AUTO: NORMAL
KETONES UR-MCNC: NORMAL
LEUKOCYTE ESTERASE UR QL STRIP: NORMAL
NITRITE UR QL STRIP: NORMAL
PH UR STRIP: 5
PROT UR STRIP-MCNC: NORMAL
SP GR UR STRIP: 1.01

## 2023-03-21 PROCEDURE — 99213 OFFICE O/P EST LOW 20 MIN: CPT | Mod: GC,25

## 2023-03-21 PROCEDURE — G0463: CPT

## 2023-03-21 PROCEDURE — 81003 URINALYSIS AUTO W/O SCOPE: CPT

## 2023-03-29 DIAGNOSIS — O09.292 SUPERVISION OF PREGNANCY WITH OTHER POOR REPRODUCTIVE OR OBSTETRIC HISTORY, SECOND TRIMESTER: ICD-10-CM

## 2023-03-29 DIAGNOSIS — O09.522 SUPERVISION OF ELDERLY MULTIGRAVIDA, SECOND TRIMESTER: ICD-10-CM

## 2023-03-29 DIAGNOSIS — O09.90 SUPERVISION OF HIGH RISK PREGNANCY, UNSPECIFIED, UNSPECIFIED TRIMESTER: ICD-10-CM

## 2023-03-29 DIAGNOSIS — O26.612 LIVER AND BILIARY TRACT DISORDERS IN PREGNANCY, SECOND TRIMESTER: ICD-10-CM

## 2023-04-04 ENCOUNTER — APPOINTMENT (OUTPATIENT)
Dept: MATERNAL FETAL MEDICINE | Facility: CLINIC | Age: 38
End: 2023-04-04
Payer: MEDICAID

## 2023-04-04 ENCOUNTER — LABORATORY RESULT (OUTPATIENT)
Age: 38
End: 2023-04-04

## 2023-04-04 ENCOUNTER — APPOINTMENT (OUTPATIENT)
Dept: ANTEPARTUM | Facility: CLINIC | Age: 38
End: 2023-04-04
Payer: MEDICAID

## 2023-04-04 ENCOUNTER — OUTPATIENT (OUTPATIENT)
Dept: OUTPATIENT SERVICES | Facility: HOSPITAL | Age: 38
LOS: 1 days | End: 2023-04-04
Payer: MEDICAID

## 2023-04-04 ENCOUNTER — ASOB RESULT (OUTPATIENT)
Age: 38
End: 2023-04-04

## 2023-04-04 VITALS
SYSTOLIC BLOOD PRESSURE: 119 MMHG | OXYGEN SATURATION: 98 % | DIASTOLIC BLOOD PRESSURE: 81 MMHG | BODY MASS INDEX: 25.49 KG/M2 | HEIGHT: 61 IN | WEIGHT: 135 LBS | HEART RATE: 88 BPM

## 2023-04-04 DIAGNOSIS — O35.8XX0 MATERNAL CARE FOR OTHER (SUSPECTED) FETAL ABNORMALITY AND DAMAGE, NOT APPLICABLE OR UNSPECIFIED: ICD-10-CM

## 2023-04-04 DIAGNOSIS — O99.019 ANEMIA COMPLICATING PREGNANCY, UNSPECIFIED TRIMESTER: ICD-10-CM

## 2023-04-04 DIAGNOSIS — O09.899 SUPERVISION OF OTHER HIGH RISK PREGNANCIES, UNSPECIFIED TRIMESTER: ICD-10-CM

## 2023-04-04 DIAGNOSIS — Z3A.19 19 WEEKS GESTATION OF PREGNANCY: ICD-10-CM

## 2023-04-04 DIAGNOSIS — O09.522 SUPERVISION OF ELDERLY MULTIGRAVIDA, SECOND TRIMESTER: ICD-10-CM

## 2023-04-04 LAB
BILIRUB UR QL STRIP: NORMAL
GLUCOSE UR-MCNC: NORMAL
HCG UR QL: 4 EU/DL
HGB UR QL STRIP.AUTO: NORMAL
KETONES UR-MCNC: NORMAL
LEUKOCYTE ESTERASE UR QL STRIP: NORMAL
NITRITE UR QL STRIP: NORMAL
PH UR STRIP: 6
PROT UR STRIP-MCNC: NORMAL
SP GR UR STRIP: 1.01

## 2023-04-04 PROCEDURE — 81003 URINALYSIS AUTO W/O SCOPE: CPT

## 2023-04-04 PROCEDURE — 76811 OB US DETAILED SNGL FETUS: CPT | Mod: 26

## 2023-04-04 PROCEDURE — 76811 OB US DETAILED SNGL FETUS: CPT

## 2023-04-04 PROCEDURE — 99213 OFFICE O/P EST LOW 20 MIN: CPT | Mod: GE,25

## 2023-04-04 PROCEDURE — G0463: CPT

## 2023-04-06 LAB — BACTERIA UR CULT: NORMAL

## 2023-04-18 DIAGNOSIS — O09.92 SUPERVISION OF HIGH RISK PREGNANCY, UNSPECIFIED, SECOND TRIMESTER: ICD-10-CM

## 2023-04-18 DIAGNOSIS — D57.1 SICKLE-CELL DISEASE WITHOUT CRISIS: ICD-10-CM

## 2023-04-18 DIAGNOSIS — O26.612 LIVER AND BILIARY TRACT DISORDERS IN PREGNANCY, SECOND TRIMESTER: ICD-10-CM

## 2023-04-24 ENCOUNTER — APPOINTMENT (OUTPATIENT)
Dept: ULTRASOUND IMAGING | Facility: HOSPITAL | Age: 38
End: 2023-04-24

## 2023-04-24 ENCOUNTER — RESULT REVIEW (OUTPATIENT)
Age: 38
End: 2023-04-24

## 2023-04-24 ENCOUNTER — OUTPATIENT (OUTPATIENT)
Dept: OUTPATIENT SERVICES | Facility: HOSPITAL | Age: 38
LOS: 1 days | End: 2023-04-24
Payer: MEDICAID

## 2023-04-24 DIAGNOSIS — D57.1 SICKLE-CELL DISEASE WITHOUT CRISIS: ICD-10-CM

## 2023-04-24 DIAGNOSIS — R74.01 ELEVATION OF LEVELS OF LIVER TRANSAMINASE LEVELS: ICD-10-CM

## 2023-04-24 DIAGNOSIS — O09.92 SUPERVISION OF HIGH RISK PREGNANCY, UNSPECIFIED, SECOND TRIMESTER: ICD-10-CM

## 2023-04-24 DIAGNOSIS — O26.612 LIVER AND BILIARY TRACT DISORDERS IN PREGNANCY, SECOND TRIMESTER: ICD-10-CM

## 2023-04-24 PROCEDURE — 76705 ECHO EXAM OF ABDOMEN: CPT

## 2023-04-25 ENCOUNTER — APPOINTMENT (OUTPATIENT)
Dept: MATERNAL FETAL MEDICINE | Facility: CLINIC | Age: 38
End: 2023-04-25
Payer: MEDICAID

## 2023-04-25 ENCOUNTER — OUTPATIENT (OUTPATIENT)
Dept: OUTPATIENT SERVICES | Facility: HOSPITAL | Age: 38
LOS: 1 days | End: 2023-04-25
Payer: MEDICAID

## 2023-04-25 VITALS
DIASTOLIC BLOOD PRESSURE: 80 MMHG | BODY MASS INDEX: 26.06 KG/M2 | HEART RATE: 79 BPM | HEIGHT: 61 IN | OXYGEN SATURATION: 96 % | SYSTOLIC BLOOD PRESSURE: 122 MMHG | WEIGHT: 138 LBS

## 2023-04-25 DIAGNOSIS — O09.899 SUPERVISION OF OTHER HIGH RISK PREGNANCIES, UNSPECIFIED TRIMESTER: ICD-10-CM

## 2023-04-25 PROCEDURE — 81003 URINALYSIS AUTO W/O SCOPE: CPT

## 2023-04-25 PROCEDURE — 85610 PROTHROMBIN TIME: CPT

## 2023-04-25 PROCEDURE — 99213 OFFICE O/P EST LOW 20 MIN: CPT | Mod: GE,25

## 2023-04-25 PROCEDURE — 85385 FIBRINOGEN ANTIGEN: CPT

## 2023-04-25 PROCEDURE — 85027 COMPLETE CBC AUTOMATED: CPT

## 2023-04-25 PROCEDURE — 80053 COMPREHEN METABOLIC PANEL: CPT

## 2023-04-25 PROCEDURE — 85730 THROMBOPLASTIN TIME PARTIAL: CPT

## 2023-04-25 PROCEDURE — 83789 MASS SPECTROMETRY QUAL/QUAN: CPT

## 2023-04-25 PROCEDURE — 82239 BILE ACIDS TOTAL: CPT

## 2023-04-25 PROCEDURE — G0463: CPT

## 2023-04-25 RX ORDER — URSODIOL 500 MG/1
500 TABLET ORAL 3 TIMES DAILY
Qty: 30 | Refills: 2 | Status: ACTIVE | COMMUNITY
Start: 2023-04-25 | End: 1900-01-01

## 2023-04-26 LAB
ALBUMIN SERPL ELPH-MCNC: 3.5 G/DL — SIGNIFICANT CHANGE UP (ref 3.3–5)
ALP SERPL-CCNC: 197 U/L — HIGH (ref 40–120)
ALT FLD-CCNC: 124 U/L — HIGH (ref 10–45)
ANION GAP SERPL CALC-SCNC: 15 MMOL/L — SIGNIFICANT CHANGE UP (ref 5–17)
ANISOCYTOSIS BLD QL: SIGNIFICANT CHANGE UP
AST SERPL-CCNC: 171 U/L — HIGH (ref 10–40)
BILIRUB SERPL-MCNC: 2.4 MG/DL — HIGH (ref 0.2–1.2)
BUN SERPL-MCNC: 9 MG/DL — SIGNIFICANT CHANGE UP (ref 7–23)
CALCIUM SERPL-MCNC: 10.2 MG/DL — SIGNIFICANT CHANGE UP (ref 8.4–10.5)
CHLORIDE SERPL-SCNC: 103 MMOL/L — SIGNIFICANT CHANGE UP (ref 96–108)
CO2 SERPL-SCNC: 18 MMOL/L — LOW (ref 22–31)
CREAT SERPL-MCNC: 0.6 MG/DL — SIGNIFICANT CHANGE UP (ref 0.5–1.3)
EGFR: 117 ML/MIN/1.73M2 — SIGNIFICANT CHANGE UP
ELLIPTOCYTES BLD QL SMEAR: SLIGHT — SIGNIFICANT CHANGE UP
GLUCOSE SERPL-MCNC: 81 MG/DL — SIGNIFICANT CHANGE UP (ref 70–99)
HCT VFR BLD CALC: 24.7 % — LOW (ref 34.5–45)
HGB BLD-MCNC: 8.6 G/DL — LOW (ref 11.5–15.5)
MACROCYTES BLD QL: SLIGHT — SIGNIFICANT CHANGE UP
MANUAL SMEAR VERIFICATION: SIGNIFICANT CHANGE UP
MCHC RBC-ENTMCNC: 34.8 GM/DL — SIGNIFICANT CHANGE UP (ref 32–36)
MCHC RBC-ENTMCNC: 35.2 PG — HIGH (ref 27–34)
MCV RBC AUTO: 101.2 FL — HIGH (ref 80–100)
NRBC # BLD: 12 /100 WBCS — HIGH (ref 0–0)
PLAT MORPH BLD: NORMAL — SIGNIFICANT CHANGE UP
PLATELET # BLD AUTO: 464 K/UL — HIGH (ref 150–400)
POIKILOCYTOSIS BLD QL AUTO: SIGNIFICANT CHANGE UP
POLYCHROMASIA BLD QL SMEAR: SIGNIFICANT CHANGE UP
POTASSIUM SERPL-MCNC: 4.8 MMOL/L — SIGNIFICANT CHANGE UP (ref 3.5–5.3)
POTASSIUM SERPL-SCNC: 4.8 MMOL/L — SIGNIFICANT CHANGE UP (ref 3.5–5.3)
PROT SERPL-MCNC: 7.3 G/DL — SIGNIFICANT CHANGE UP (ref 6–8.3)
RBC # BLD: 2.44 M/UL — LOW (ref 3.8–5.2)
RBC # FLD: 21.4 % — HIGH (ref 10.3–14.5)
RBC BLD AUTO: ABNORMAL
SICKLE CELLS BLD QL SMEAR: SLIGHT — SIGNIFICANT CHANGE UP
SODIUM SERPL-SCNC: 136 MMOL/L — SIGNIFICANT CHANGE UP (ref 135–145)
TARGETS BLD QL SMEAR: SLIGHT — SIGNIFICANT CHANGE UP
WBC # BLD: 15.57 K/UL — HIGH (ref 3.8–10.5)
WBC # FLD AUTO: 15.57 K/UL — HIGH (ref 3.8–10.5)

## 2023-04-28 LAB — BILE AC FLD-MCNC: 35.6 UMOL/L — HIGH (ref 0–10)

## 2023-04-29 LAB — FIBRINOGEN AG PPP IA-MCNC: 546 MG/DL — HIGH (ref 206–478)

## 2023-05-02 ENCOUNTER — NON-APPOINTMENT (OUTPATIENT)
Age: 38
End: 2023-05-02

## 2023-05-02 ENCOUNTER — APPOINTMENT (OUTPATIENT)
Dept: HEPATOLOGY | Facility: CLINIC | Age: 38
End: 2023-05-02
Payer: MEDICAID

## 2023-05-02 VITALS
HEART RATE: 92 BPM | TEMPERATURE: 97.8 F | RESPIRATION RATE: 16 BRPM | OXYGEN SATURATION: 95 % | BODY MASS INDEX: 25.11 KG/M2 | DIASTOLIC BLOOD PRESSURE: 81 MMHG | HEIGHT: 61 IN | WEIGHT: 133 LBS | SYSTOLIC BLOOD PRESSURE: 124 MMHG

## 2023-05-02 LAB
BILE AC SERPL-SCNC: 37 UMOL/L — HIGH
CDCAE SER-MCNC: 7.4 UMOL/L — HIGH
CHOLATE SER-MCNC: 9.3 UMOL/L — HIGH
DO-CHOLATE SER-MCNC: 2 UMOL/L — SIGNIFICANT CHANGE UP
URSODEOXYCHOLATE SERPL-SCNC: 18 UMOL/L — HIGH

## 2023-05-02 PROCEDURE — 99203 OFFICE O/P NEW LOW 30 MIN: CPT

## 2023-05-03 ENCOUNTER — NON-APPOINTMENT (OUTPATIENT)
Age: 38
End: 2023-05-03

## 2023-05-03 ENCOUNTER — OUTPATIENT (OUTPATIENT)
Dept: OUTPATIENT SERVICES | Facility: HOSPITAL | Age: 38
LOS: 1 days | Discharge: HOME | End: 2023-05-03

## 2023-05-03 DIAGNOSIS — D57.1 SICKLE-CELL DISEASE WITHOUT CRISIS: ICD-10-CM

## 2023-05-03 LAB
ACE BLD-CCNC: 49 U/L
ALBUMIN SERPL ELPH-MCNC: 3.6 G/DL
ALP BLD-CCNC: 215 U/L
ALT SERPL-CCNC: 147 U/L
ANION GAP SERPL CALC-SCNC: 15 MMOL/L
AST SERPL-CCNC: 202 U/L
BILIRUB SERPL-MCNC: 2.9 MG/DL
BUN SERPL-MCNC: 9 MG/DL
CALCIUM SERPL-MCNC: 10.2 MG/DL
CHLORIDE SERPL-SCNC: 103 MMOL/L
CO2 SERPL-SCNC: 18 MMOL/L
CREAT SERPL-MCNC: 0.68 MG/DL
DEPRECATED KAPPA LC FREE/LAMBDA SER: 1.45 RATIO
EGFR: 114 ML/MIN/1.73M2
GLUCOSE SERPL-MCNC: 89 MG/DL
IGA SER QL IEP: 770 MG/DL
IGG SER QL IEP: 1590 MG/DL
IGM SER QL IEP: 37 MG/DL
KAPPA LC CSF-MCNC: 3.87 MG/DL
KAPPA LC SERPL-MCNC: 5.63 MG/DL
POTASSIUM SERPL-SCNC: 4.4 MMOL/L
PROT SERPL-MCNC: 7.6 G/DL
SODIUM SERPL-SCNC: 136 MMOL/L

## 2023-05-03 NOTE — REVIEW OF SYSTEMS
[Feeling Tired] : feeling tired [Itching] : itching [Negative] : Heme/Lymph [Fever] : no fever [Chills] : no chills [Skin Lesions] : no skin lesions [Skin Wound] : no skin wound [Change In A Mole] : no change in a mole [Breast Pain] : no breast pain [Breast Lump] : no breast lump

## 2023-05-03 NOTE — HISTORY OF PRESENT ILLNESS
[FreeTextEntry1] : 38F 28W pregnant presenting for evaluation of elevated liver tests\par \par Hx of SCD - hx of crisis in February - gets every 2 years - with hospitalization - no hx of acute chest, hx of multiple blood transfusions\par No hx of autoimmune disease\par No FHX of liver disease\par No OTC Meds\par No herbals\par Had 2 prior children\par Liver tests chronically elevated prior to and after each pregnancy \par She denies any other acute complaints at this time\par Her pregnancy has otherwise been stable at this time without any acute issues but in past she may have cholestasis of Pregnancy in the past and has remained on Ursodiol \par \par Currently on PNV and Folic Acid -  Ursodiol / Benadryl . Procardia ASA\par \par Cholestasis of Pregnancy in past \par She has ongoing itching and is using Benadryl\par \par Catherine House MFM\par Josselin Guevara - OB GYN\par \par

## 2023-05-03 NOTE — ASSESSMENT
[FreeTextEntry1] : 38F with SCD and multiple complications with hx of cholestasis during pregnancy presenting for chronically elevated liver tests. She had recent US without any acute pathology. Liver test elevation does not appear to be correlative with pregnancy as they were elevated both before and after. Workup has been limited in past - DDX includes AIH vs cholestasis of SCD vs PSC vs secondary sclerosing cholangitis vs other viral infection. Given chronicity would be reasonable to obtain serologic testing for other forms of liver disease but ultimately may need better imaging and maybe even a biopsy\par \par Will confirm with OB if MRCP feasible at this time or if we should wait\par Will check AMA and ACE level as well\par \par Plan to RTC after delivery\par Synthetic function appears to be preserved for the most part\par Can continue Ursodiol and current meds

## 2023-05-03 NOTE — PHYSICAL EXAM
[Sclera] : the sclera and conjunctiva were normal [PERRL With Normal Accommodation] : pupils were equal in size, round, and reactive to light [Extraocular Movements] : extraocular movements were intact [Outer Ear] : the ears and nose were normal in appearance [Oropharynx] : the oropharynx was normal [Auscultation Breath Sounds / Voice Sounds] : lungs were clear to auscultation bilaterally [Heart Rate And Rhythm] : heart rate was normal and rhythm regular [Heart Sounds] : normal S1 and S2 [Heart Sounds Gallop] : no gallops [Murmurs] : no murmurs [Heart Sounds Pericardial Friction Rub] : no pericardial rub [Full Pulse] : the pedal pulses are present [Edema] : there was no peripheral edema [No CVA Tenderness] : no ~M costovertebral angle tenderness [No Spinal Tenderness] : no spinal tenderness [Abnormal Walk] : normal gait [Nail Clubbing] : no clubbing  or cyanosis of the fingernails [Musculoskeletal - Swelling] : no joint swelling seen [Motor Tone] : muscle strength and tone were normal [Skin Color & Pigmentation] : normal skin color and pigmentation [Skin Turgor] : normal skin turgor [] : no rash [Oriented To Time, Place, And Person] : oriented to person, place, and time [Impaired Insight] : insight and judgment were intact [Affect] : the affect was normal [FreeTextEntry1] : Gravid Uterus

## 2023-05-04 ENCOUNTER — NON-APPOINTMENT (OUTPATIENT)
Age: 38
End: 2023-05-04

## 2023-05-04 DIAGNOSIS — O09.92 SUPERVISION OF HIGH RISK PREGNANCY, UNSPECIFIED, SECOND TRIMESTER: ICD-10-CM

## 2023-05-05 LAB
ANA SER IF-ACNC: NEGATIVE
BILE AC SER-MCNC: 34.2 UMOL/L
MITOCHONDRIA AB SER IF-ACNC: NORMAL
SMOOTH MUSCLE AB SER QL IF: NORMAL

## 2023-05-09 ENCOUNTER — ASOB RESULT (OUTPATIENT)
Age: 38
End: 2023-05-09

## 2023-05-09 ENCOUNTER — NON-APPOINTMENT (OUTPATIENT)
Age: 38
End: 2023-05-09

## 2023-05-09 ENCOUNTER — APPOINTMENT (OUTPATIENT)
Dept: MATERNAL FETAL MEDICINE | Facility: CLINIC | Age: 38
End: 2023-05-09
Payer: MEDICAID

## 2023-05-09 ENCOUNTER — APPOINTMENT (OUTPATIENT)
Dept: ANTEPARTUM | Facility: CLINIC | Age: 38
End: 2023-05-09
Payer: MEDICAID

## 2023-05-09 ENCOUNTER — OUTPATIENT (OUTPATIENT)
Dept: OUTPATIENT SERVICES | Facility: HOSPITAL | Age: 38
LOS: 1 days | End: 2023-05-09
Payer: MEDICAID

## 2023-05-09 VITALS
BODY MASS INDEX: 25.68 KG/M2 | OXYGEN SATURATION: 96 % | HEIGHT: 61 IN | HEART RATE: 58 BPM | DIASTOLIC BLOOD PRESSURE: 82 MMHG | WEIGHT: 136 LBS | SYSTOLIC BLOOD PRESSURE: 119 MMHG

## 2023-05-09 DIAGNOSIS — O09.899 SUPERVISION OF OTHER HIGH RISK PREGNANCIES, UNSPECIFIED TRIMESTER: ICD-10-CM

## 2023-05-09 PROCEDURE — 76819 FETAL BIOPHYS PROFIL W/O NST: CPT | Mod: 26,59

## 2023-05-09 PROCEDURE — 87086 URINE CULTURE/COLONY COUNT: CPT

## 2023-05-09 PROCEDURE — 76816 OB US FOLLOW-UP PER FETUS: CPT | Mod: 26

## 2023-05-09 PROCEDURE — G0463: CPT

## 2023-05-09 PROCEDURE — 99214 OFFICE O/P EST MOD 30 MIN: CPT | Mod: 25

## 2023-05-09 PROCEDURE — 81003 URINALYSIS AUTO W/O SCOPE: CPT

## 2023-05-10 ENCOUNTER — LABORATORY RESULT (OUTPATIENT)
Age: 38
End: 2023-05-10

## 2023-05-10 LAB
BILEACIDS T: 39 UMOL/L
BILIRUB UR QL STRIP: NORMAL
CHENDEOXYCHOLIC ACID: 15 UMOL/L
CHOLIC ACID: 21 UMOL/L
COLLECTION METHOD: NORMAL
DEOXYCHOLIC ACID: 2.4 UMOL/L
GLUCOSE UR-MCNC: NORMAL
HCG UR QL: 8 EU/DL
HGB UR QL STRIP.AUTO: NORMAL
KETONES UR-MCNC: NORMAL
LEUKOCYTE ESTERASE UR QL STRIP: NORMAL
NITRITE UR QL STRIP: NORMAL
PH UR STRIP: 6
PROT UR STRIP-MCNC: 30
SP GR UR STRIP: 1.01
URSODEOXYCH: 0.5 UMOL/L

## 2023-05-11 LAB
CULTURE RESULTS: NO GROWTH — SIGNIFICANT CHANGE UP
SPECIMEN SOURCE: SIGNIFICANT CHANGE UP

## 2023-05-12 ENCOUNTER — RESULT REVIEW (OUTPATIENT)
Age: 38
End: 2023-05-12

## 2023-05-12 ENCOUNTER — OUTPATIENT (OUTPATIENT)
Dept: OUTPATIENT SERVICES | Facility: HOSPITAL | Age: 38
LOS: 1 days | End: 2023-05-12
Payer: MEDICAID

## 2023-05-12 ENCOUNTER — NON-APPOINTMENT (OUTPATIENT)
Age: 38
End: 2023-05-12

## 2023-05-12 ENCOUNTER — APPOINTMENT (OUTPATIENT)
Dept: HEMATOLOGY ONCOLOGY | Facility: CLINIC | Age: 38
End: 2023-05-12

## 2023-05-12 ENCOUNTER — APPOINTMENT (OUTPATIENT)
Dept: HEMATOLOGY ONCOLOGY | Facility: CLINIC | Age: 38
End: 2023-05-12
Payer: MEDICAID

## 2023-05-12 VITALS
SYSTOLIC BLOOD PRESSURE: 130 MMHG | HEIGHT: 61 IN | RESPIRATION RATE: 16 BRPM | TEMPERATURE: 97.8 F | HEART RATE: 70 BPM | WEIGHT: 134.99 LBS | DIASTOLIC BLOOD PRESSURE: 83 MMHG | BODY MASS INDEX: 25.49 KG/M2 | OXYGEN SATURATION: 96 %

## 2023-05-12 DIAGNOSIS — D57.1 SICKLE-CELL DISEASE WITHOUT CRISIS: ICD-10-CM

## 2023-05-12 DIAGNOSIS — Z87.19 PERSONAL HISTORY OF OTHER COMPLICATIONS OF PREGNANCY, CHILDBIRTH AND THE PUERPERIUM: ICD-10-CM

## 2023-05-12 DIAGNOSIS — Z87.59 PERSONAL HISTORY OF OTHER COMPLICATIONS OF PREGNANCY, CHILDBIRTH AND THE PUERPERIUM: ICD-10-CM

## 2023-05-12 LAB
ANISOCYTOSIS BLD QL: SLIGHT — SIGNIFICANT CHANGE UP
BASOPHILS # BLD AUTO: 0.08 K/UL — SIGNIFICANT CHANGE UP (ref 0–0.2)
BASOPHILS NFR BLD AUTO: 0.5 % — SIGNIFICANT CHANGE UP (ref 0–2)
ELLIPTOCYTES BLD QL SMEAR: SLIGHT — SIGNIFICANT CHANGE UP
EOSINOPHIL # BLD AUTO: 0.08 K/UL — SIGNIFICANT CHANGE UP (ref 0–0.5)
EOSINOPHIL NFR BLD AUTO: 0.5 % — SIGNIFICANT CHANGE UP (ref 0–6)
GIANT PLATELETS BLD QL SMEAR: PRESENT — SIGNIFICANT CHANGE UP
HCT VFR BLD CALC: 23.1 % — LOW (ref 34.5–45)
HGB BLD-MCNC: 8.2 G/DL — LOW (ref 11.5–15.5)
HYPOCHROMIA BLD QL: SLIGHT — SIGNIFICANT CHANGE UP
LG PLATELETS BLD QL AUTO: SLIGHT — SIGNIFICANT CHANGE UP
LYMPHOCYTES # BLD AUTO: 19.5 % — SIGNIFICANT CHANGE UP (ref 13–44)
LYMPHOCYTES # BLD AUTO: 3.01 K/UL — SIGNIFICANT CHANGE UP (ref 1–3.3)
MCHC RBC-ENTMCNC: 34.7 PG — HIGH (ref 27–34)
MCHC RBC-ENTMCNC: 35.5 G/DL — SIGNIFICANT CHANGE UP (ref 32–36)
MCV RBC AUTO: 97.9 FL — SIGNIFICANT CHANGE UP (ref 80–100)
METAMYELOCYTES # FLD: 0.5 % — HIGH (ref 0–0)
MONOCYTES # BLD AUTO: 1.47 K/UL — HIGH (ref 0–0.9)
MONOCYTES NFR BLD AUTO: 9.5 % — SIGNIFICANT CHANGE UP (ref 2–14)
MYELOCYTES NFR BLD: 1 % — HIGH (ref 0–0)
NEUTROPHILS # BLD AUTO: 10.58 K/UL — HIGH (ref 1.8–7.4)
NEUTROPHILS NFR BLD AUTO: 68.5 % — SIGNIFICANT CHANGE UP (ref 43–77)
NRBC # BLD: 26 /100 — HIGH (ref 0–0)
NRBC # BLD: SIGNIFICANT CHANGE UP /100 WBCS (ref 0–0)
PLAT MORPH BLD: ABNORMAL
PLATELET # BLD AUTO: 445 K/UL — HIGH (ref 150–400)
POIKILOCYTOSIS BLD QL AUTO: SIGNIFICANT CHANGE UP
POLYCHROMASIA BLD QL SMEAR: SLIGHT — SIGNIFICANT CHANGE UP
RBC # BLD: 2.36 M/UL — LOW (ref 3.8–5.2)
RBC # FLD: 24.3 % — HIGH (ref 10.3–14.5)
RBC BLD AUTO: ABNORMAL
RETICS #: SIGNIFICANT CHANGE UP K/UL (ref 25–125)
RETICS/RBC NFR: SIGNIFICANT CHANGE UP % (ref 0.5–2.5)
SCHISTOCYTES BLD QL AUTO: SLIGHT — SIGNIFICANT CHANGE UP
SICKLE CELLS BLD QL SMEAR: SIGNIFICANT CHANGE UP
TARGETS BLD QL SMEAR: SLIGHT — SIGNIFICANT CHANGE UP
WBC # BLD: 15.44 K/UL — HIGH (ref 3.8–10.5)
WBC # FLD AUTO: 15.44 K/UL — HIGH (ref 3.8–10.5)

## 2023-05-12 PROCEDURE — 99204 OFFICE O/P NEW MOD 45 MIN: CPT

## 2023-05-12 RX ORDER — DIPHENHYDRAMINE HCL 25 MG/1
25 CAPSULE ORAL
Qty: 30 | Refills: 2 | Status: DISCONTINUED | COMMUNITY
Start: 2023-03-14 | End: 2023-05-12

## 2023-05-12 RX ORDER — NIFEDIPINE 30 MG/1
30 TABLET, FILM COATED, EXTENDED RELEASE ORAL DAILY
Qty: 30 | Refills: 1 | Status: DISCONTINUED | COMMUNITY
Start: 2018-06-04 | End: 2023-05-12

## 2023-05-12 RX ORDER — FOLIC ACID 1 MG/1
1 TABLET ORAL
Refills: 0 | Status: ACTIVE | COMMUNITY

## 2023-05-15 ENCOUNTER — APPOINTMENT (OUTPATIENT)
Dept: INFUSION THERAPY | Facility: HOSPITAL | Age: 38
End: 2023-05-15

## 2023-05-15 NOTE — REVIEW OF SYSTEMS
[Muscle Pain] : muscle pain [Negative] : Allergic/Immunologic [Joint Pain] : no joint pain [Joint Stiffness] : no joint stiffness [Muscle Weakness] : no muscle weakness [FreeTextEntry9] : muscle pain to shoulders- no injuries  [de-identified] : intermittent numbness/tingling to b/l arms and 4th and 5th digit for several years

## 2023-05-15 NOTE — PHYSICAL EXAM
[Fully active, able to carry on all pre-disease performance without restriction] : Status 0 - Fully active, able to carry on all pre-disease performance without restriction [Normal] : affect appropriate [de-identified] : b/l icteric sclera  [de-identified] : pregnant  [de-identified] : tenderness to trapezius muscles

## 2023-05-15 NOTE — ASSESSMENT
[FreeTextEntry1] : Suzi is a 38 year old female with history of cholestasis of pregnancy, here for further evaluation of sickle cell disease while pregnant. She was referred by SHIRA Guevara from High risk/Maternal fetal medicine. Suzi is currently 24 weeks and 6 days pregnant, RISHI 8/26/23. She has history of sickle cell disease and last crisis was in February. She received 1 unit PRBC, IV hydration, and pain medications in [facility]. She usually has a SC crisis every 2 weeks with hospitalization. Follows up with Hematologist MD Skyler Riojas in [place] [facility], last visit was 2 weeks ago with no acute changes. Reports being pregnant twice before and had pain crises in both. She was transfused with PRBC's and was induced at 36 weeks for first pregnancy and at 7 months for second pregnancy. She has history of Cholestasis of Pregnancy in past and currently has ongoing itching and is using Benadryl cream. Labs from 5/2018- Hemoglobin electrophoresis reports HGB S% 90.1\par 4/25/23- WBC 15.57, H/H 8.6/24.7, .2, RDW 21.4, PLTS 464,\par 5/2/23- Elevated bilirubin 2.9, Alk phos 215, AST//147, IgA 770, Immunoelectrophoresis kappa free light chain 5.63, Lamda free light chain 3.87, normal ratio 1.45\par \par Plan:\par -Draw CBC, Retic, LDH, CMP, B12/Folate, Ferritin, Iron studies, (2) T&C, Hemoglobin electrophoresis. Will call patient with results. \par -Will transfuse 1 unit PRBC for HGB < 9 g/dL to decrease chance of pain crisis during pregnancy. Patient agreed and blood transfusion consent was obtained. \par \par Patient seen and evaluated, case and management discussed with MD Mikey Jc\par \par Attending Attestation\par Patient was seen and examined with FLAVIO Costa.  I was present in all pertinent parts of the interview and this impression is my own. \par Patient with history of sickle cell, now pregnant into 2nd trimester. 3 months ago she suffered a pain crisis episode for the first time in a while early in pregnancy.  Offered transfusional support to decrease rate of ischemic pain reactions in the setting fof pregnancy.  Patient agreed, will have patient set up for monthly transfusions. Given baseline Hg 8.5 we are limited in the number of transfusions as rasing HCT too high can cause vasoocclusive complication as well, would aim to keep the Hg under 10.  \par \par \par

## 2023-05-15 NOTE — HISTORY OF PRESENT ILLNESS
[Date: ____________] : Patient's last distress assessment performed on [unfilled]. [0 - No Distress] : Distress Level: 0 [100: Normal, no complaints, no evidence of disease.] : 100: Normal, no complaints, no evidence of disease. [ECOG Performance Status: 0 - Fully active, able to carry on all pre-disease performance without restriction] : Performance Status: 0 - Fully active, able to carry on all pre-disease performance without restriction [de-identified] : Suzi is a 38 year old female with history of cholestasis of pregnancy, here for further evaluation of sickle cell disease while pregnant. She was referred by SHIRA Guevara from High risk/Maternal fetal medicine. Suzi is currently 24 weeks and 6 days pregnant, RISHI 8/26/23. She has history of sickle cell disease and last crisis was in February. She received 1 unit PRBC, IV hydration, and pain medications in [facility]. She usually has a SC crisis every 2 weeks with hospitalization. Follows up with Hematologist MD Skyler Riojas in [place] [facility], last visit was 2 weeks ago with no acute changes. Reports being pregnant twice before and had pain crises in both. She was transfused with PRBC's and was induced at 36 weeks for first pregnancy and at 7 months for second pregnancy. She has history of Cholestasis of Pregnancy in past and currently has ongoing itching and is using Benadryl cream. \par \par Overall, she feels well. Denies any fever/chills, nigh sweats, unintentional nigh sweats, fatigue, SOB, CP, palpitations. Pruritus since 2 months of pregnancy, muscle pain to shoulders for about a week, intermittent numbness/tingling to RUE for several years (denies any injuries). \par \par Labs\par 5/2018- Hemoglobin electrophoresis reports HGB S% 90.1\par 4/25/23- WBC 15.57, H/H 8.6/24.7, .2, RDW 21.4, PLTS 464,\par 5/2/23- Elevated bilirubin 2.9, Alk phos 215, AST//147, IgA 770, Immunoelectrophoresis kappa free light chain 5.63, Lamda free light chain 3.87, normal ratio 1.45\par

## 2023-05-16 ENCOUNTER — APPOINTMENT (OUTPATIENT)
Dept: ANTEPARTUM | Facility: CLINIC | Age: 38
End: 2023-05-16

## 2023-05-16 DIAGNOSIS — Z51.89 ENCOUNTER FOR OTHER SPECIFIED AFTERCARE: ICD-10-CM

## 2023-05-17 ENCOUNTER — NON-APPOINTMENT (OUTPATIENT)
Age: 38
End: 2023-05-17

## 2023-05-17 LAB
ALBUMIN SERPL ELPH-MCNC: 3.4 G/DL
ALP BLD-CCNC: 235 U/L
ALT SERPL-CCNC: 141 U/L
ANION GAP SERPL CALC-SCNC: 14 MMOL/L
AST SERPL-CCNC: 236 U/L
BILIRUB SERPL-MCNC: 3.6 MG/DL
BUN SERPL-MCNC: 10 MG/DL
CALCIUM SERPL-MCNC: 9.8 MG/DL
CHLORIDE SERPL-SCNC: 99 MMOL/L
CO2 SERPL-SCNC: 18 MMOL/L
CREAT SERPL-MCNC: 0.6 MG/DL
EGFR: 117 ML/MIN/1.73M2
FERRITIN SERPL-MCNC: 413 NG/ML
FOLATE SERPL-MCNC: >20 NG/ML
GLUCOSE SERPL-MCNC: 92 MG/DL
HGB A MFR BLD: 11.4 %
HGB A2 MFR BLD: 2.9 %
HGB F MFR BLD: 6.6 %
HGB FRACT BLD-IMP: NORMAL
HGB S MFR BLD: 79.1 %
IRON SATN MFR SERPL: 56 %
IRON SERPL-MCNC: 278 UG/DL
LDH SERPL-CCNC: 1118 U/L
POTASSIUM SERPL-SCNC: 4.7 MMOL/L
PROT SERPL-MCNC: 7.4 G/DL
SODIUM SERPL-SCNC: 131 MMOL/L
TIBC SERPL-MCNC: 495 UG/DL
UIBC SERPL-MCNC: 217 UG/DL
VIT B12 SERPL-MCNC: 709 PG/ML

## 2023-05-18 ENCOUNTER — NON-APPOINTMENT (OUTPATIENT)
Age: 38
End: 2023-05-18

## 2023-05-19 DIAGNOSIS — O99.019 ANEMIA COMPLICATING PREGNANCY, UNSPECIFIED TRIMESTER: ICD-10-CM

## 2023-05-19 DIAGNOSIS — O09.90 SUPERVISION OF HIGH RISK PREGNANCY, UNSPECIFIED, UNSPECIFIED TRIMESTER: ICD-10-CM

## 2023-05-19 DIAGNOSIS — O09.522 SUPERVISION OF ELDERLY MULTIGRAVIDA, SECOND TRIMESTER: ICD-10-CM

## 2023-05-19 DIAGNOSIS — O26.612 LIVER AND BILIARY TRACT DISORDERS IN PREGNANCY, SECOND TRIMESTER: ICD-10-CM

## 2023-05-23 ENCOUNTER — NON-APPOINTMENT (OUTPATIENT)
Age: 38
End: 2023-05-23

## 2023-05-23 ENCOUNTER — APPOINTMENT (OUTPATIENT)
Dept: ANTEPARTUM | Facility: CLINIC | Age: 38
End: 2023-05-23

## 2023-05-23 ENCOUNTER — APPOINTMENT (OUTPATIENT)
Dept: MATERNAL FETAL MEDICINE | Facility: CLINIC | Age: 38
End: 2023-05-23

## 2023-05-26 ENCOUNTER — NON-APPOINTMENT (OUTPATIENT)
Age: 38
End: 2023-05-26

## 2023-05-30 ENCOUNTER — APPOINTMENT (OUTPATIENT)
Dept: ANTEPARTUM | Facility: CLINIC | Age: 38
End: 2023-05-30
Payer: MEDICAID

## 2023-05-30 ENCOUNTER — ASOB RESULT (OUTPATIENT)
Age: 38
End: 2023-05-30

## 2023-05-30 ENCOUNTER — APPOINTMENT (OUTPATIENT)
Dept: MATERNAL FETAL MEDICINE | Facility: CLINIC | Age: 38
End: 2023-05-30
Payer: MEDICAID

## 2023-05-30 ENCOUNTER — OUTPATIENT (OUTPATIENT)
Dept: OUTPATIENT SERVICES | Facility: HOSPITAL | Age: 38
LOS: 1 days | End: 2023-05-30
Payer: MEDICAID

## 2023-05-30 VITALS
HEART RATE: 89 BPM | WEIGHT: 137 LBS | BODY MASS INDEX: 25.86 KG/M2 | SYSTOLIC BLOOD PRESSURE: 108 MMHG | OXYGEN SATURATION: 99 % | DIASTOLIC BLOOD PRESSURE: 71 MMHG | HEIGHT: 61 IN

## 2023-05-30 DIAGNOSIS — O09.899 SUPERVISION OF OTHER HIGH RISK PREGNANCIES, UNSPECIFIED TRIMESTER: ICD-10-CM

## 2023-05-30 LAB
INR BLD: 0.99 RATIO — SIGNIFICANT CHANGE UP (ref 0.88–1.16)
PROTHROM AB SERPL-ACNC: 11.5 SEC — SIGNIFICANT CHANGE UP (ref 10.5–13.4)

## 2023-05-30 PROCEDURE — 85027 COMPLETE CBC AUTOMATED: CPT

## 2023-05-30 PROCEDURE — 99214 OFFICE O/P EST MOD 30 MIN: CPT | Mod: GC

## 2023-05-30 PROCEDURE — 76818 FETAL BIOPHYS PROFILE W/NST: CPT | Mod: 26,59

## 2023-05-30 PROCEDURE — 86850 RBC ANTIBODY SCREEN: CPT

## 2023-05-30 PROCEDURE — 84550 ASSAY OF BLOOD/URIC ACID: CPT

## 2023-05-30 PROCEDURE — 86900 BLOOD TYPING SEROLOGIC ABO: CPT

## 2023-05-30 PROCEDURE — 76816 OB US FOLLOW-UP PER FETUS: CPT | Mod: 26

## 2023-05-30 PROCEDURE — 85610 PROTHROMBIN TIME: CPT

## 2023-05-30 PROCEDURE — 86765 RUBEOLA ANTIBODY: CPT

## 2023-05-30 PROCEDURE — 80053 COMPREHEN METABOLIC PANEL: CPT

## 2023-05-30 PROCEDURE — G0463: CPT

## 2023-05-30 PROCEDURE — 82950 GLUCOSE TEST: CPT

## 2023-05-30 PROCEDURE — 83615 LACTATE (LD) (LDH) ENZYME: CPT

## 2023-05-31 LAB
ALBUMIN SERPL ELPH-MCNC: 2.8 G/DL — LOW (ref 3.3–5)
ALP SERPL-CCNC: 236 U/L — HIGH (ref 40–120)
ALT FLD-CCNC: 150 U/L — HIGH (ref 10–45)
ANION GAP SERPL CALC-SCNC: 14 MMOL/L — SIGNIFICANT CHANGE UP (ref 5–17)
AST SERPL-CCNC: 222 U/L — HIGH (ref 10–40)
BILIRUB SERPL-MCNC: 2.9 MG/DL — HIGH (ref 0.2–1.2)
BUN SERPL-MCNC: 8 MG/DL — SIGNIFICANT CHANGE UP (ref 7–23)
CALCIUM SERPL-MCNC: 9.6 MG/DL — SIGNIFICANT CHANGE UP (ref 8.4–10.5)
CHLORIDE SERPL-SCNC: 103 MMOL/L — SIGNIFICANT CHANGE UP (ref 96–108)
CO2 SERPL-SCNC: 18 MMOL/L — LOW (ref 22–31)
CREAT SERPL-MCNC: 0.67 MG/DL — SIGNIFICANT CHANGE UP (ref 0.5–1.3)
EGFR: 114 ML/MIN/1.73M2 — SIGNIFICANT CHANGE UP
GLUCOSE 1H P MEAL SERPL-MCNC: 96 MG/DL — SIGNIFICANT CHANGE UP (ref 70–134)
GLUCOSE SERPL-MCNC: 96 MG/DL — SIGNIFICANT CHANGE UP (ref 70–99)
HCT VFR BLD CALC: 24.9 % — LOW (ref 34.5–45)
HGB BLD-MCNC: 8.4 G/DL — LOW (ref 11.5–15.5)
LDH SERPL L TO P-CCNC: 1148 U/L — HIGH (ref 50–242)
MCHC RBC-ENTMCNC: 33.7 GM/DL — SIGNIFICANT CHANGE UP (ref 32–36)
MCHC RBC-ENTMCNC: 34.9 PG — HIGH (ref 27–34)
MCV RBC AUTO: 103.3 FL — HIGH (ref 80–100)
MEV IGG SER-ACNC: 274 AU/ML — SIGNIFICANT CHANGE UP
MEV IGG+IGM SER-IMP: POSITIVE — SIGNIFICANT CHANGE UP
PLATELET # BLD AUTO: 435 K/UL — HIGH (ref 150–400)
POTASSIUM SERPL-MCNC: 4.4 MMOL/L — SIGNIFICANT CHANGE UP (ref 3.5–5.3)
POTASSIUM SERPL-SCNC: 4.4 MMOL/L — SIGNIFICANT CHANGE UP (ref 3.5–5.3)
PROT SERPL-MCNC: 7 G/DL — SIGNIFICANT CHANGE UP (ref 6–8.3)
RBC # BLD: 2.41 M/UL — LOW (ref 3.8–5.2)
RBC # FLD: 21.8 % — HIGH (ref 10.3–14.5)
SODIUM SERPL-SCNC: 135 MMOL/L — SIGNIFICANT CHANGE UP (ref 135–145)
URATE SERPL-MCNC: 8.9 MG/DL — HIGH (ref 2.5–7)

## 2023-06-01 LAB
NRBC # BLD: 34 /100 WBCS — HIGH (ref 0–0)
WBC # BLD: 14.62 K/UL — HIGH (ref 3.8–10.5)
WBC # FLD AUTO: 14.62 K/UL — HIGH (ref 3.8–10.5)

## 2023-06-06 ENCOUNTER — OUTPATIENT (OUTPATIENT)
Dept: OUTPATIENT SERVICES | Facility: HOSPITAL | Age: 38
LOS: 1 days | End: 2023-06-06
Payer: MEDICAID

## 2023-06-06 ENCOUNTER — APPOINTMENT (OUTPATIENT)
Dept: MATERNAL FETAL MEDICINE | Facility: CLINIC | Age: 38
End: 2023-06-06
Payer: MEDICAID

## 2023-06-06 VITALS
BODY MASS INDEX: 25.86 KG/M2 | HEIGHT: 61 IN | WEIGHT: 137 LBS | DIASTOLIC BLOOD PRESSURE: 80 MMHG | OXYGEN SATURATION: 97 % | SYSTOLIC BLOOD PRESSURE: 120 MMHG | HEART RATE: 83 BPM

## 2023-06-06 DIAGNOSIS — O26.619 LIVER AND BILIARY TRACT DISORDERS IN PREGNANCY, UNSPECIFIED TRIMESTER: ICD-10-CM

## 2023-06-06 DIAGNOSIS — O09.90 SUPERVISION OF HIGH RISK PREGNANCY, UNSPECIFIED, UNSPECIFIED TRIMESTER: ICD-10-CM

## 2023-06-06 DIAGNOSIS — O99.019 ANEMIA COMPLICATING PREGNANCY, UNSPECIFIED TRIMESTER: ICD-10-CM

## 2023-06-06 DIAGNOSIS — Z23 ENCOUNTER FOR IMMUNIZATION: ICD-10-CM

## 2023-06-06 DIAGNOSIS — O09.899 SUPERVISION OF OTHER HIGH RISK PREGNANCIES, UNSPECIFIED TRIMESTER: ICD-10-CM

## 2023-06-06 LAB
BILIRUB UR QL STRIP: NORMAL
COLLECTION METHOD: NORMAL
GLUCOSE UR-MCNC: NORMAL
HCG UR QL: 2 EU/DL
HGB UR QL STRIP.AUTO: NORMAL
KETONES UR-MCNC: NORMAL
LEUKOCYTE ESTERASE UR QL STRIP: NORMAL
NITRITE UR QL STRIP: NORMAL
PH UR STRIP: 5.5
PROT UR STRIP-MCNC: 100
SP GR UR STRIP: 1.01

## 2023-06-06 PROCEDURE — G0463: CPT

## 2023-06-06 PROCEDURE — 90715 TDAP VACCINE 7 YRS/> IM: CPT

## 2023-06-06 PROCEDURE — 90471 IMMUNIZATION ADMIN: CPT

## 2023-06-06 PROCEDURE — 81003 URINALYSIS AUTO W/O SCOPE: CPT

## 2023-06-06 PROCEDURE — 90471 IMMUNIZATION ADMIN: CPT | Mod: NC

## 2023-06-06 PROCEDURE — 99213 OFFICE O/P EST LOW 20 MIN: CPT | Mod: 25,GE

## 2023-06-08 DIAGNOSIS — Z87.51 PERSONAL HISTORY OF PRE-TERM LABOR: ICD-10-CM

## 2023-06-08 DIAGNOSIS — O26.619 LIVER AND BILIARY TRACT DISORDERS IN PREGNANCY, UNSPECIFIED TRIMESTER: ICD-10-CM

## 2023-06-08 DIAGNOSIS — O09.90 SUPERVISION OF HIGH RISK PREGNANCY, UNSPECIFIED, UNSPECIFIED TRIMESTER: ICD-10-CM

## 2023-06-08 DIAGNOSIS — D57.1 SICKLE-CELL DISEASE WITHOUT CRISIS: ICD-10-CM

## 2023-06-09 ENCOUNTER — RESULT REVIEW (OUTPATIENT)
Age: 38
End: 2023-06-09

## 2023-06-09 ENCOUNTER — APPOINTMENT (OUTPATIENT)
Dept: HEMATOLOGY ONCOLOGY | Facility: CLINIC | Age: 38
End: 2023-06-09

## 2023-06-09 LAB
ANISOCYTOSIS BLD QL: SLIGHT — SIGNIFICANT CHANGE UP
BASOPHILS # BLD AUTO: 0 K/UL — SIGNIFICANT CHANGE UP (ref 0–0.2)
BASOPHILS NFR BLD AUTO: 0 % — SIGNIFICANT CHANGE UP (ref 0–2)
ELLIPTOCYTES BLD QL SMEAR: SLIGHT — SIGNIFICANT CHANGE UP
EOSINOPHIL # BLD AUTO: 0.25 K/UL — SIGNIFICANT CHANGE UP (ref 0–0.5)
EOSINOPHIL NFR BLD AUTO: 2 % — SIGNIFICANT CHANGE UP (ref 0–6)
GIANT PLATELETS BLD QL SMEAR: PRESENT — SIGNIFICANT CHANGE UP
HCT VFR BLD CALC: 23.4 % — LOW (ref 34.5–45)
HGB BLD-MCNC: 8.3 G/DL — LOW (ref 11.5–15.5)
HYPOCHROMIA BLD QL: SLIGHT — SIGNIFICANT CHANGE UP
LG PLATELETS BLD QL AUTO: SLIGHT — SIGNIFICANT CHANGE UP
LYMPHOCYTES # BLD AUTO: 24 % — SIGNIFICANT CHANGE UP (ref 13–44)
LYMPHOCYTES # BLD AUTO: 3.04 K/UL — SIGNIFICANT CHANGE UP (ref 1–3.3)
MCHC RBC-ENTMCNC: 34.4 PG — HIGH (ref 27–34)
MCHC RBC-ENTMCNC: 35.5 G/DL — SIGNIFICANT CHANGE UP (ref 32–36)
MCV RBC AUTO: 97.1 FL — SIGNIFICANT CHANGE UP (ref 80–100)
MONOCYTES # BLD AUTO: 1.14 K/UL — HIGH (ref 0–0.9)
MONOCYTES NFR BLD AUTO: 9 % — SIGNIFICANT CHANGE UP (ref 2–14)
MYELOCYTES NFR BLD: 4 % — HIGH (ref 0–0)
NEUTROPHILS # BLD AUTO: 7.72 K/UL — HIGH (ref 1.8–7.4)
NEUTROPHILS NFR BLD AUTO: 61 % — SIGNIFICANT CHANGE UP (ref 43–77)
NRBC # BLD: 75 /100 — HIGH (ref 0–0)
NRBC # BLD: SIGNIFICANT CHANGE UP /100 WBCS (ref 0–0)
PLAT MORPH BLD: ABNORMAL
PLATELET # BLD AUTO: 384 K/UL — SIGNIFICANT CHANGE UP (ref 150–400)
POIKILOCYTOSIS BLD QL AUTO: SIGNIFICANT CHANGE UP
POLYCHROMASIA BLD QL SMEAR: SLIGHT — SIGNIFICANT CHANGE UP
RBC # BLD: 2.41 M/UL — LOW (ref 3.8–5.2)
RBC # FLD: 24.1 % — HIGH (ref 10.3–14.5)
RBC BLD AUTO: ABNORMAL
SCHISTOCYTES BLD QL AUTO: SLIGHT — SIGNIFICANT CHANGE UP
SICKLE CELLS BLD QL SMEAR: SIGNIFICANT CHANGE UP
TARGETS BLD QL SMEAR: SLIGHT — SIGNIFICANT CHANGE UP
WBC # BLD: 12.65 K/UL — HIGH (ref 3.8–10.5)
WBC # FLD AUTO: 12.65 K/UL — HIGH (ref 3.8–10.5)

## 2023-06-12 ENCOUNTER — OUTPATIENT (OUTPATIENT)
Dept: OUTPATIENT SERVICES | Facility: HOSPITAL | Age: 38
LOS: 1 days | End: 2023-06-12

## 2023-06-12 ENCOUNTER — APPOINTMENT (OUTPATIENT)
Dept: INFUSION THERAPY | Facility: HOSPITAL | Age: 38
End: 2023-06-12

## 2023-06-12 DIAGNOSIS — D57.1 SICKLE-CELL DISEASE WITHOUT CRISIS: ICD-10-CM

## 2023-06-12 PROCEDURE — 86923 COMPATIBILITY TEST ELECTRIC: CPT

## 2023-06-12 PROCEDURE — 86902 BLOOD TYPE ANTIGEN DONOR EA: CPT

## 2023-06-12 PROCEDURE — 86900 BLOOD TYPING SEROLOGIC ABO: CPT

## 2023-06-12 PROCEDURE — 86901 BLOOD TYPING SEROLOGIC RH(D): CPT

## 2023-06-12 PROCEDURE — 86850 RBC ANTIBODY SCREEN: CPT

## 2023-06-13 ENCOUNTER — NON-APPOINTMENT (OUTPATIENT)
Age: 38
End: 2023-06-13

## 2023-06-13 ENCOUNTER — APPOINTMENT (OUTPATIENT)
Dept: CARDIOLOGY | Facility: CLINIC | Age: 38
End: 2023-06-13
Payer: MEDICAID

## 2023-06-13 DIAGNOSIS — R74.01 ELEVATION OF LEVELS OF LIVER TRANSAMINASE LEVELS: ICD-10-CM

## 2023-06-13 DIAGNOSIS — O99.019 ANEMIA COMPLICATING PREGNANCY, UNSPECIFIED TRIMESTER: ICD-10-CM

## 2023-06-13 DIAGNOSIS — K83.1 LIVER AND BILIARY TRACT DISORDERS IN PREGNANCY, UNSPECIFIED TRIMESTER: ICD-10-CM

## 2023-06-13 DIAGNOSIS — Z87.51 PERSONAL HISTORY OF PRE-TERM LABOR: ICD-10-CM

## 2023-06-13 DIAGNOSIS — Z83.3 FAMILY HISTORY OF DIABETES MELLITUS: ICD-10-CM

## 2023-06-13 DIAGNOSIS — O26.619 LIVER AND BILIARY TRACT DISORDERS IN PREGNANCY, UNSPECIFIED TRIMESTER: ICD-10-CM

## 2023-06-13 DIAGNOSIS — R03.0 ELEVATED BLOOD-PRESSURE READING, W/OUT DIAGNOSIS OF HYPERTENSION: ICD-10-CM

## 2023-06-13 DIAGNOSIS — D57.1 ANEMIA COMPLICATING PREGNANCY, UNSPECIFIED TRIMESTER: ICD-10-CM

## 2023-06-13 DIAGNOSIS — Z82.49 FAMILY HISTORY OF ISCHEMIC HEART DISEASE AND OTHER DISEASES OF THE CIRCULATORY SYSTEM: ICD-10-CM

## 2023-06-13 PROCEDURE — 99215 OFFICE O/P EST HI 40 MIN: CPT | Mod: 95

## 2023-06-15 ENCOUNTER — NON-APPOINTMENT (OUTPATIENT)
Age: 38
End: 2023-06-15

## 2023-06-16 ENCOUNTER — APPOINTMENT (OUTPATIENT)
Dept: ANTEPARTUM | Facility: CLINIC | Age: 38
End: 2023-06-16

## 2023-06-16 ENCOUNTER — APPOINTMENT (OUTPATIENT)
Dept: ANTEPARTUM | Facility: CLINIC | Age: 38
End: 2023-06-16
Payer: MEDICAID

## 2023-06-16 ENCOUNTER — ASOB RESULT (OUTPATIENT)
Age: 38
End: 2023-06-16

## 2023-06-16 PROCEDURE — 76818 FETAL BIOPHYS PROFILE W/NST: CPT | Mod: 26

## 2023-06-18 PROBLEM — Z87.51 HISTORY OF PRETERM DELIVERY: Status: RESOLVED | Noted: 2018-06-04 | Resolved: 2023-06-18

## 2023-06-18 PROBLEM — R74.01 TRANSAMINITIS: Status: ACTIVE | Noted: 2023-03-07

## 2023-06-19 ENCOUNTER — TRANSCRIPTION ENCOUNTER (OUTPATIENT)
Age: 38
End: 2023-06-19

## 2023-06-20 ENCOUNTER — NON-APPOINTMENT (OUTPATIENT)
Age: 38
End: 2023-06-20

## 2023-06-20 ENCOUNTER — INPATIENT (INPATIENT)
Facility: HOSPITAL | Age: 38
LOS: 4 days | Discharge: HOME | End: 2023-06-25
Attending: OBSTETRICS & GYNECOLOGY | Admitting: OBSTETRICS & GYNECOLOGY
Payer: MEDICAID

## 2023-06-20 ENCOUNTER — ASOB RESULT (OUTPATIENT)
Age: 38
End: 2023-06-20

## 2023-06-20 ENCOUNTER — APPOINTMENT (OUTPATIENT)
Dept: MATERNAL FETAL MEDICINE | Facility: CLINIC | Age: 38
End: 2023-06-20
Payer: MEDICAID

## 2023-06-20 ENCOUNTER — OUTPATIENT (OUTPATIENT)
Dept: OUTPATIENT SERVICES | Facility: HOSPITAL | Age: 38
LOS: 1 days | End: 2023-06-20
Payer: MEDICAID

## 2023-06-20 ENCOUNTER — APPOINTMENT (OUTPATIENT)
Dept: ANTEPARTUM | Facility: CLINIC | Age: 38
End: 2023-06-20
Payer: MEDICAID

## 2023-06-20 VITALS — OXYGEN SATURATION: 94 %

## 2023-06-20 DIAGNOSIS — Z90.49 ACQUIRED ABSENCE OF OTHER SPECIFIED PARTS OF DIGESTIVE TRACT: Chronic | ICD-10-CM

## 2023-06-20 DIAGNOSIS — Z87.59 PERSONAL HISTORY OF OTHER COMPLICATIONS OF PREGNANCY, CHILDBIRTH AND THE PUERPERIUM: Chronic | ICD-10-CM

## 2023-06-20 DIAGNOSIS — Z34.80 ENCOUNTER FOR SUPERVISION OF OTHER NORMAL PREGNANCY, UNSPECIFIED TRIMESTER: ICD-10-CM

## 2023-06-20 DIAGNOSIS — O26.899 OTHER SPECIFIED PREGNANCY RELATED CONDITIONS, UNSPECIFIED TRIMESTER: ICD-10-CM

## 2023-06-20 DIAGNOSIS — O09.899 SUPERVISION OF OTHER HIGH RISK PREGNANCIES, UNSPECIFIED TRIMESTER: ICD-10-CM

## 2023-06-20 PROBLEM — O26.619 CHOLESTASIS DURING PREGNANCY, ANTEPARTUM: Status: ACTIVE | Noted: 2023-03-14

## 2023-06-20 PROBLEM — Z82.49 FAMILY HISTORY OF ESSENTIAL HYPERTENSION: Status: ACTIVE | Noted: 2023-06-18

## 2023-06-20 PROBLEM — Z83.3 FAMILY HISTORY OF TYPE 2 DIABETES MELLITUS: Status: ACTIVE | Noted: 2023-06-18

## 2023-06-20 PROBLEM — R03.0 ELEVATED BLOOD PRESSURE READING WITHOUT DIAGNOSIS OF HYPERTENSION: Status: ACTIVE | Noted: 2018-06-25

## 2023-06-20 PROBLEM — O99.019 MATERNAL SICKLE CELL ANEMIA AFFECTING PREGNANCY, ANTEPARTUM: Status: ACTIVE | Noted: 2023-05-09

## 2023-06-20 LAB
ALBUMIN SERPL ELPH-MCNC: 2.8 G/DL — LOW (ref 3.3–5)
ALP SERPL-CCNC: 275 U/L — HIGH (ref 40–120)
ALT FLD-CCNC: 89 U/L — HIGH (ref 10–45)
ANION GAP SERPL CALC-SCNC: 17 MMOL/L — SIGNIFICANT CHANGE UP (ref 5–17)
ANISOCYTOSIS BLD QL: SIGNIFICANT CHANGE UP
APPEARANCE UR: CLEAR — SIGNIFICANT CHANGE UP
APTT BLD: 25.1 SEC — LOW (ref 27.5–35.5)
AST SERPL-CCNC: 162 U/L — HIGH (ref 10–40)
BACTERIA # UR AUTO: NEGATIVE — SIGNIFICANT CHANGE UP
BASOPHILS # BLD AUTO: 0 K/UL — SIGNIFICANT CHANGE UP (ref 0–0.2)
BASOPHILS NFR BLD AUTO: 0 % — SIGNIFICANT CHANGE UP (ref 0–2)
BILIRUB SERPL-MCNC: 4.4 MG/DL — HIGH (ref 0.2–1.2)
BILIRUB UR-MCNC: NEGATIVE — SIGNIFICANT CHANGE UP
BUN SERPL-MCNC: 8 MG/DL — SIGNIFICANT CHANGE UP (ref 7–23)
CALCIUM SERPL-MCNC: 9.1 MG/DL — SIGNIFICANT CHANGE UP (ref 8.4–10.5)
CHLORIDE SERPL-SCNC: 108 MMOL/L — SIGNIFICANT CHANGE UP (ref 96–108)
CO2 SERPL-SCNC: 15 MMOL/L — LOW (ref 22–31)
COLOR SPEC: YELLOW — SIGNIFICANT CHANGE UP
CREAT ?TM UR-MCNC: 19 MG/DL — SIGNIFICANT CHANGE UP
CREAT SERPL-MCNC: 0.7 MG/DL — SIGNIFICANT CHANGE UP (ref 0.5–1.3)
DIFF PNL FLD: ABNORMAL
EGFR: 113 ML/MIN/1.73M2 — SIGNIFICANT CHANGE UP
EOSINOPHIL # BLD AUTO: 0 K/UL — SIGNIFICANT CHANGE UP (ref 0–0.5)
EOSINOPHIL NFR BLD AUTO: 0 % — SIGNIFICANT CHANGE UP (ref 0–6)
EPI CELLS # UR: 1 /HPF — SIGNIFICANT CHANGE UP
FIBRINOGEN PPP-MCNC: 641 MG/DL — HIGH (ref 200–445)
GLUCOSE SERPL-MCNC: 77 MG/DL — SIGNIFICANT CHANGE UP (ref 70–99)
GLUCOSE UR QL: NEGATIVE — SIGNIFICANT CHANGE UP
HCT VFR BLD CALC: 26.1 % — LOW (ref 34.5–45)
HGB BLD-MCNC: 9.1 G/DL — LOW (ref 11.5–15.5)
HYALINE CASTS # UR AUTO: 2 /LPF — SIGNIFICANT CHANGE UP (ref 0–2)
INR BLD: 0.96 RATIO — SIGNIFICANT CHANGE UP (ref 0.88–1.16)
KETONES UR-MCNC: NEGATIVE — SIGNIFICANT CHANGE UP
LDH SERPL L TO P-CCNC: 1364 U/L — HIGH (ref 50–242)
LEUKOCYTE ESTERASE UR-ACNC: NEGATIVE — SIGNIFICANT CHANGE UP
LYMPHOCYTES # BLD AUTO: 2.92 K/UL — SIGNIFICANT CHANGE UP (ref 1–3.3)
LYMPHOCYTES # BLD AUTO: 23.4 % — SIGNIFICANT CHANGE UP (ref 13–44)
MACROCYTES BLD QL: SIGNIFICANT CHANGE UP
MANUAL SMEAR VERIFICATION: SIGNIFICANT CHANGE UP
MCHC RBC-ENTMCNC: 33.3 PG — SIGNIFICANT CHANGE UP (ref 27–34)
MCHC RBC-ENTMCNC: 34.9 GM/DL — SIGNIFICANT CHANGE UP (ref 32–36)
MCV RBC AUTO: 95.6 FL — SIGNIFICANT CHANGE UP (ref 80–100)
MONOCYTES # BLD AUTO: 1.46 K/UL — HIGH (ref 0–0.9)
MONOCYTES NFR BLD AUTO: 11.7 % — SIGNIFICANT CHANGE UP (ref 2–14)
MYELOCYTES NFR BLD: 0.9 % — HIGH (ref 0–0)
NEUTROPHILS # BLD AUTO: 7.98 K/UL — HIGH (ref 1.8–7.4)
NEUTROPHILS NFR BLD AUTO: 64 % — SIGNIFICANT CHANGE UP (ref 43–77)
NITRITE UR-MCNC: NEGATIVE — SIGNIFICANT CHANGE UP
NRBC # BLD: 94 /100 — HIGH (ref 0–0)
OVALOCYTES BLD QL SMEAR: SLIGHT — SIGNIFICANT CHANGE UP
PAPPENHEIMER BOD BLD QL SMEAR: PRESENT — SIGNIFICANT CHANGE UP
PH UR: 6.5 — SIGNIFICANT CHANGE UP (ref 5–8)
PLAT MORPH BLD: ABNORMAL
PLATELET # BLD AUTO: 396 K/UL — SIGNIFICANT CHANGE UP (ref 150–400)
POIKILOCYTOSIS BLD QL AUTO: SIGNIFICANT CHANGE UP
POLYCHROMASIA BLD QL SMEAR: SIGNIFICANT CHANGE UP
POTASSIUM SERPL-MCNC: 4.3 MMOL/L — SIGNIFICANT CHANGE UP (ref 3.5–5.3)
POTASSIUM SERPL-SCNC: 4.3 MMOL/L — SIGNIFICANT CHANGE UP (ref 3.5–5.3)
PROT ?TM UR-MCNC: 66 MG/DL — HIGH (ref 0–12)
PROT SERPL-MCNC: 7.3 G/DL — SIGNIFICANT CHANGE UP (ref 6–8.3)
PROT UR-MCNC: ABNORMAL
PROT/CREAT UR-RTO: 3.5 RATIO — HIGH (ref 0–0.2)
PROTHROM AB SERPL-ACNC: 11 SEC — SIGNIFICANT CHANGE UP (ref 10.5–13.4)
RBC # BLD: 2.73 M/UL — LOW (ref 3.8–5.2)
RBC # FLD: 24.6 % — HIGH (ref 10.3–14.5)
RBC BLD AUTO: ABNORMAL
RBC CASTS # UR COMP ASSIST: 2 /HPF — SIGNIFICANT CHANGE UP (ref 0–4)
SICKLE CELLS BLD QL SMEAR: SIGNIFICANT CHANGE UP
SODIUM SERPL-SCNC: 140 MMOL/L — SIGNIFICANT CHANGE UP (ref 135–145)
SP GR SPEC: 1.01 — LOW (ref 1.01–1.02)
URATE SERPL-MCNC: 8.5 MG/DL — HIGH (ref 2.5–7)
UROBILINOGEN FLD QL: ABNORMAL
WBC # BLD: 12.47 K/UL — HIGH (ref 3.8–10.5)
WBC # FLD AUTO: 12.47 K/UL — HIGH (ref 3.8–10.5)
WBC UR QL: 1 /HPF — SIGNIFICANT CHANGE UP (ref 0–5)

## 2023-06-20 PROCEDURE — 59514 CESAREAN DELIVERY ONLY: CPT | Mod: U7

## 2023-06-20 PROCEDURE — 76820 UMBILICAL ARTERY ECHO: CPT | Mod: 26,59

## 2023-06-20 PROCEDURE — 76818 FETAL BIOPHYS PROFILE W/NST: CPT | Mod: 26,59

## 2023-06-20 PROCEDURE — 76816 OB US FOLLOW-UP PER FETUS: CPT | Mod: 26

## 2023-06-20 PROCEDURE — 88307 TISSUE EXAM BY PATHOLOGIST: CPT | Mod: 26

## 2023-06-20 PROCEDURE — G0463: CPT

## 2023-06-20 PROCEDURE — 99213 OFFICE O/P EST LOW 20 MIN: CPT

## 2023-06-20 DEVICE — INTERCEED 3 X 4": Type: IMPLANTABLE DEVICE | Status: FUNCTIONAL

## 2023-06-20 RX ORDER — MAGNESIUM SULFATE 500 MG/ML
4 VIAL (ML) INJECTION ONCE
Refills: 0 | Status: COMPLETED | OUTPATIENT
Start: 2023-06-20 | End: 2023-06-20

## 2023-06-20 RX ORDER — HYDROMORPHONE HYDROCHLORIDE 2 MG/ML
30 INJECTION INTRAMUSCULAR; INTRAVENOUS; SUBCUTANEOUS
Refills: 0 | Status: DISCONTINUED | OUTPATIENT
Start: 2023-06-20 | End: 2023-06-22

## 2023-06-20 RX ORDER — IBUPROFEN 200 MG
600 TABLET ORAL EVERY 6 HOURS
Refills: 0 | Status: COMPLETED | OUTPATIENT
Start: 2023-06-20 | End: 2024-05-18

## 2023-06-20 RX ORDER — TETANUS TOXOID, REDUCED DIPHTHERIA TOXOID AND ACELLULAR PERTUSSIS VACCINE, ADSORBED 5; 2.5; 8; 8; 2.5 [IU]/.5ML; [IU]/.5ML; UG/.5ML; UG/.5ML; UG/.5ML
0.5 SUSPENSION INTRAMUSCULAR ONCE
Refills: 0 | Status: DISCONTINUED | OUTPATIENT
Start: 2023-06-20 | End: 2023-06-25

## 2023-06-20 RX ORDER — CEFAZOLIN SODIUM 1 G
2000 VIAL (EA) INJECTION EVERY 8 HOURS
Refills: 0 | Status: DISCONTINUED | OUTPATIENT
Start: 2023-06-20 | End: 2023-06-20

## 2023-06-20 RX ORDER — SODIUM CHLORIDE 9 MG/ML
1000 INJECTION, SOLUTION INTRAVENOUS
Refills: 0 | Status: DISCONTINUED | OUTPATIENT
Start: 2023-06-20 | End: 2023-06-25

## 2023-06-20 RX ORDER — MAGNESIUM HYDROXIDE 400 MG/1
30 TABLET, CHEWABLE ORAL
Refills: 0 | Status: DISCONTINUED | OUTPATIENT
Start: 2023-06-20 | End: 2023-06-25

## 2023-06-20 RX ORDER — ACETAMINOPHEN 500 MG
1000 TABLET ORAL ONCE
Refills: 0 | Status: COMPLETED | OUTPATIENT
Start: 2023-06-20 | End: 2023-06-20

## 2023-06-20 RX ORDER — OXYTOCIN 10 UNIT/ML
333.33 VIAL (ML) INJECTION
Qty: 20 | Refills: 0 | Status: DISCONTINUED | OUTPATIENT
Start: 2023-06-20 | End: 2023-06-25

## 2023-06-20 RX ORDER — LANOLIN
1 OINTMENT (GRAM) TOPICAL EVERY 6 HOURS
Refills: 0 | Status: DISCONTINUED | OUTPATIENT
Start: 2023-06-20 | End: 2023-06-25

## 2023-06-20 RX ORDER — ACETAMINOPHEN 500 MG
975 TABLET ORAL
Refills: 0 | Status: DISCONTINUED | OUTPATIENT
Start: 2023-06-20 | End: 2023-06-25

## 2023-06-20 RX ORDER — SIMETHICONE 80 MG/1
80 TABLET, CHEWABLE ORAL EVERY 4 HOURS
Refills: 0 | Status: DISCONTINUED | OUTPATIENT
Start: 2023-06-20 | End: 2023-06-25

## 2023-06-20 RX ORDER — OXYCODONE HYDROCHLORIDE 5 MG/1
5 TABLET ORAL
Refills: 0 | Status: DISCONTINUED | OUTPATIENT
Start: 2023-06-20 | End: 2023-06-25

## 2023-06-20 RX ORDER — HYDROMORPHONE HYDROCHLORIDE 2 MG/ML
0.5 INJECTION INTRAMUSCULAR; INTRAVENOUS; SUBCUTANEOUS
Refills: 0 | Status: DISCONTINUED | OUTPATIENT
Start: 2023-06-20 | End: 2023-06-22

## 2023-06-20 RX ORDER — SODIUM CHLORIDE 9 MG/ML
1000 INJECTION, SOLUTION INTRAVENOUS
Refills: 0 | Status: DISCONTINUED | OUTPATIENT
Start: 2023-06-20 | End: 2023-06-20

## 2023-06-20 RX ORDER — NIFEDIPINE 30 MG
10 TABLET, EXTENDED RELEASE 24 HR ORAL ONCE
Refills: 0 | Status: COMPLETED | OUTPATIENT
Start: 2023-06-20 | End: 2023-06-20

## 2023-06-20 RX ORDER — HYDROMORPHONE HYDROCHLORIDE 2 MG/ML
2 INJECTION INTRAMUSCULAR; INTRAVENOUS; SUBCUTANEOUS EVERY 6 HOURS
Refills: 0 | Status: DISCONTINUED | OUTPATIENT
Start: 2023-06-20 | End: 2023-06-25

## 2023-06-20 RX ORDER — ONDANSETRON 8 MG/1
4 TABLET, FILM COATED ORAL EVERY 6 HOURS
Refills: 0 | Status: DISCONTINUED | OUTPATIENT
Start: 2023-06-20 | End: 2023-06-22

## 2023-06-20 RX ORDER — KETOROLAC TROMETHAMINE 30 MG/ML
30 SYRINGE (ML) INJECTION EVERY 6 HOURS
Refills: 0 | Status: DISCONTINUED | OUTPATIENT
Start: 2023-06-20 | End: 2023-06-21

## 2023-06-20 RX ORDER — URSODIOL 250 MG/1
500 TABLET, FILM COATED ORAL EVERY 8 HOURS
Refills: 0 | Status: DISCONTINUED | OUTPATIENT
Start: 2023-06-20 | End: 2023-06-20

## 2023-06-20 RX ORDER — MAGNESIUM SULFATE 500 MG/ML
2 VIAL (ML) INJECTION
Qty: 40 | Refills: 0 | Status: DISCONTINUED | OUTPATIENT
Start: 2023-06-20 | End: 2023-06-21

## 2023-06-20 RX ORDER — NALOXONE HYDROCHLORIDE 4 MG/.1ML
0.1 SPRAY NASAL
Refills: 0 | Status: DISCONTINUED | OUTPATIENT
Start: 2023-06-20 | End: 2023-06-22

## 2023-06-20 RX ORDER — SODIUM CHLORIDE 9 MG/ML
1000 INJECTION INTRAMUSCULAR; INTRAVENOUS; SUBCUTANEOUS
Refills: 0 | Status: DISCONTINUED | OUTPATIENT
Start: 2023-06-20 | End: 2023-06-25

## 2023-06-20 RX ORDER — ACETAMINOPHEN 500 MG
1000 TABLET ORAL ONCE
Refills: 0 | Status: DISCONTINUED | OUTPATIENT
Start: 2023-06-20 | End: 2023-06-20

## 2023-06-20 RX ORDER — DIPHENOXYLATE HCL/ATROPINE 2.5-.025MG
2 TABLET ORAL ONCE
Refills: 0 | Status: DISCONTINUED | OUTPATIENT
Start: 2023-06-20 | End: 2023-06-20

## 2023-06-20 RX ORDER — OXYTOCIN 10 UNIT/ML
333.33 VIAL (ML) INJECTION
Qty: 20 | Refills: 0 | Status: DISCONTINUED | OUTPATIENT
Start: 2023-06-20 | End: 2023-06-20

## 2023-06-20 RX ORDER — CEFAZOLIN SODIUM 1 G
2000 VIAL (EA) INJECTION EVERY 8 HOURS
Refills: 0 | Status: COMPLETED | OUTPATIENT
Start: 2023-06-20 | End: 2023-06-21

## 2023-06-20 RX ORDER — NALBUPHINE HYDROCHLORIDE 10 MG/ML
2.5 INJECTION, SOLUTION INTRAMUSCULAR; INTRAVENOUS; SUBCUTANEOUS EVERY 6 HOURS
Refills: 0 | Status: DISCONTINUED | OUTPATIENT
Start: 2023-06-20 | End: 2023-06-22

## 2023-06-20 RX ORDER — HEPARIN SODIUM 5000 [USP'U]/ML
5000 INJECTION INTRAVENOUS; SUBCUTANEOUS
Refills: 0 | Status: DISCONTINUED | OUTPATIENT
Start: 2023-06-20 | End: 2023-06-25

## 2023-06-20 RX ORDER — OXYCODONE HYDROCHLORIDE 5 MG/1
5 TABLET ORAL ONCE
Refills: 0 | Status: DISCONTINUED | OUTPATIENT
Start: 2023-06-20 | End: 2023-06-25

## 2023-06-20 RX ORDER — NIFEDIPINE 30 MG
30 TABLET, EXTENDED RELEASE 24 HR ORAL DAILY
Refills: 0 | Status: DISCONTINUED | OUTPATIENT
Start: 2023-06-20 | End: 2023-06-25

## 2023-06-20 RX ORDER — DIPHENHYDRAMINE HCL 50 MG
25 CAPSULE ORAL EVERY 6 HOURS
Refills: 0 | Status: DISCONTINUED | OUTPATIENT
Start: 2023-06-20 | End: 2023-06-25

## 2023-06-20 RX ADMIN — Medication 30 MILLIGRAM(S): at 21:50

## 2023-06-20 RX ADMIN — Medication 50 GM/HR: at 19:48

## 2023-06-20 RX ADMIN — Medication 10 MILLIGRAM(S): at 16:18

## 2023-06-20 RX ADMIN — HYDROMORPHONE HYDROCHLORIDE 30 MILLILITER(S): 2 INJECTION INTRAMUSCULAR; INTRAVENOUS; SUBCUTANEOUS at 19:50

## 2023-06-20 RX ADMIN — Medication 300 GRAM(S): at 16:33

## 2023-06-20 RX ADMIN — Medication 10 MILLIGRAM(S): at 16:39

## 2023-06-20 RX ADMIN — Medication 2 TABLET(S): at 19:58

## 2023-06-20 RX ADMIN — Medication 30 MILLIGRAM(S): at 21:28

## 2023-06-20 RX ADMIN — Medication 30 MILLIGRAM(S): at 16:39

## 2023-06-20 RX ADMIN — Medication 400 MILLIGRAM(S): at 23:41

## 2023-06-20 RX ADMIN — Medication 50 GM/HR: at 16:57

## 2023-06-20 NOTE — HISTORY OF PRESENT ILLNESS
[FreeTextEntry1] : Ms. Cevallos is a 38 year old female that is 29 weeks pregnancy with a complicated medical history and presents for a cardiovascular evaluation.\par \par Patient carries a history of cholestasis of pregnancy, elevated LFTs, transaminitis and sickle cell disease. \par Her most recent crisis was in 2023. She received (1) unit of PRBC, IV hydration and pain medications at [facility]. \par \par Ms. Becerra follow up with hematologist Dr. Skyler Riojas in [place] [facility] and is now being treated by Dr. Sharee Costa at [facility]. \par \par Patient has had two prior pregnancy\par \par 1st pregnancy-   , 36 weeks gestation-> induced, -> secondary to cholestasis\par  \par 2nd pregnancy-  , induced at 33 weeks gestation secondary to preeclampsia\par \par 3rd pregnancy- , \par \par DUE DATE:   2023\par LMP:            November 15, 2022\par \par Of not, she has chronically elevated liver tests prior to and after each pregnancy.\par She denies any other acute complaints at this time.  patient has ongoing itching and is using Benadryl. \par \par Review of Hepatology notes: Given the chronicity of her elevated LFTS, suggesting serologic testing for other forms of liver disease and better imaging, biopsy post partum. \par \par Patient reports some "occasional" elevations in blood pressure. Most recently documented BP on 2023 was :   120/80.\par \par \par \par \par

## 2023-06-20 NOTE — OB RN INTRAOPERATIVE NOTE - NS_ADDITIONALPROCINFO1_OBGYN_ALL_OB_FT
consent signed by MD Jeronimo, to be signed by MD Padilla- pt unable to sign due to general anesthesia

## 2023-06-20 NOTE — OB PROVIDER LABOR PROGRESS NOTE - NS_SUBJECTIVE/OBJECTIVE_OBGYN_ALL_OB_FT
called to bed  for fetal bradycardia in somnolent  state found to be hypotensive--not responsive to IV bolus and ephedrine dose from anesthesia.     transabd son by MFM--FHR noted in 80s peristently

## 2023-06-20 NOTE — OB PROVIDER DELIVERY SUMMARY - NSPROVIDERDELIVERYNOTE_OBGYN_ALL_OB_FT
primary LTCS, crash,  under GETA, uncomplicated  viable female infant, breech presentation, cord gasses sent  Grossly normal fallopian tubes, uterus, and ovaries  Uterus closed in 2 layers   Intercede placed over hysterotomy     EBL: 950  IVF: 2000  UOP: 250    Dictation #: [id number]    Veronika Ferro, PGY2

## 2023-06-20 NOTE — OB PROVIDER DELIVERY SUMMARY - NSSELHIDDEN_OBGYN_ALL_OB_FT
[NS_DeliveryAttending1_OBGYN_ALL_OB_FT:[id number]==],[NS_DeliveryAttending2_OBGYN_ALL_OB_FT:[id number]==],[NS_DeliveryAssist1_OBGYN_ALL_OB_FT:[id number]],[NS_DeliveryRN_OBGYN_ALL_OB_FT:[id number]]

## 2023-06-20 NOTE — OB PROVIDER LABOR PROGRESS NOTE - ASSESSMENT
Fetal bradycardia not repossive to meds x > 10 min  In OR remained in 80s w maternal HR in 120-140s    Called for Delivery by MFM and self    emergent S/C done  no discussion on sterilization w pt

## 2023-06-20 NOTE — OB RN PATIENT PROFILE - NSICDXPASTSURGICALHX_GEN_ALL_CORE_FT
PAST SURGICAL HISTORY:  History of cholecystectomy     History of colon resection     History of miscarriage

## 2023-06-20 NOTE — ASSESSMENT
[FreeTextEntry1] : Ms. Cevallos is a 38 year old female that is 29 weeks pregnancy with a complicated medical history and presents for a cardiovascular evaluation.\par \par Patient carries a history of cholestasis of pregnancy, elevated LFTs, transaminitis and sickle cell disease. \par Her most recent crisis was in 2023. She received (1) unit of PRBC, IV hydration and pain medications at [facility]. \par \par Of note, patient reports some "occasional" elevations in blood pressure. Most recently documented BP on 2023 was :   120/80.\par \par \par #Elevated blood pressure readings without diagnosis of hypertension\par   Review of recent BP measurements are within range\par   Recommended to monitor BP at home once daily and record. If systolic pressures rise above 140, to \par   notify cardio OB for treatment.\par \par \par #Adverse Cardiovascular Risk Factors\par \par  Personal history of preeclampsia,  labor,  cholestasis of pregnancy, elevated LFTs, transaminitis and sickle cell disease. \par Her most recent crisis was in 2023. She received (1) unit of PRBC, IV hydration and pain medications at [facility]. \par \par Family history of hypertension, type 2 diabetes\par \par Recommending a baseline cardiovascular evaluation \par In-office physical examination and 12 lead EKG\par Echocardiogram to assess cardiac structure and function\par \par - Encouraged patient to participate in healthy activity (walking) and  eating habits, focusing on a Mediterranean style of eating.\par \par - Encouraged the patient to find healthy outlets and coping mechanisms to help manage stress, such as reducing workload if possible, spending time with family and friends, engaging in an enjoyable hobby, or using meditation or mindfulness techniques.\par \par \par \par \par \par   \par

## 2023-06-20 NOTE — OB PROVIDER TRIAGE NOTE - NSOBPROVIDERNOTE_OBGYN_ALL_OB_FT
Assessment  37yo  with PMHx of sickle cell anemia (w/ last hospitalization for pain crisis on ) at 30w3d presents for 10/10 severe headache and elevated blood pressures in clinic, r/o PEC    Plan  - Patient received nifedipine in the clinic. Pressures in the clinic at 190s SBP. Pressures upon triage in 150s/80s. Continue to monitor BPs.  - IVF  - IV Tylenol for pain management (headache). Reassess pain following medication.  - HELLP labs  - Continuous EFM    Sowmya Marrufo, MS4

## 2023-06-20 NOTE — PROVIDER CONTACT NOTE (OTHER) - ACTION/TREATMENT ORDERED:
Dr. Sykes came to bedside to assess patient. IV fluid warmer initiated. Patient given Toradol per Dr. Sykes. Will continue to monitor.

## 2023-06-20 NOTE — OB PROVIDER TRIAGE NOTE - HISTORY OF PRESENT ILLNESS
Subjective  HPI: 37yo  with PMHx of sickle cell anemia (w/ last hospitalization for pain crisis on ) at 30w3d presents for 10/10 severe headache and elevated blood pressures in clinic. +FM. -LOF. -CTXs. -VB. Patient reports that she was seen at [facility] yesterday  due to pain crisis in the setting of her sickle cell disease. She was not admitted, but was managed with pain medication and IV fluids, which has improved her symptoms, which predominantly consisted of back pain. Today, she began to have a 10/10 constant headache around 130pm. She went to the clinic at the time for her scheduled OB visit and was found to have elevated BPs to 190s/80s on multiple readings. She was managed with nifedipine and transferred to [facility] L&D. Patient denies visual changes such as black spots, floaters, or blurry vision. She denies SOB/difficulty breathing, RUQ pain, N/V. Patient reports mild to moderate swelling of the ankles and knees. She denies any other concerns.     PNC: Blood pressures reportedly wnl until today in this current pregnancy. Diagnosed with cholestasis of pregnancy (managed on ursodiol 500mg daily); prior hx of PEC in last 2 pregnancies (induced due to severe range pressures). GBS unknown.  EFW 1500g by paulino.  OBHx: SAB w/ D&C (); elective termination w/ D&C (); elective termination w/ medication ();  () at 36w IOL d/t PEC;  () at 28w IOL d/t PEC  GynHx: denies hx of fibroids, cysts, or polyps; abnormal pap w/ + HPV (procedure completed for management) (); +hx of chlamydia in adolescence (treated with antibiotics at the time); denies hx of other STD/STIs  PMH: sickle cell disease (most recent pain crisis yesterday ; prior pain crisis 2023 managed with pain medications, IVF, and transfusion)  PSH: lap cholecystectomy (); lap colectomy d/t volvulus ()  Meds: PNV, folic acid 1 mg daily, ursodiol 500mg TID, ASA 81mg  Allergies: NKDA  Social: denies substance use, lives at home with two children; stay at home mom  Will accept blood transfusions? Yes

## 2023-06-20 NOTE — OB PROVIDER H&P - ASSESSMENT
39yo  with PMHx of sickle cell anemia (w/ last hospitalization for pain crisis on ) at 30w3d presents for 10/10 severe headache and elevated blood pressures in clinic.     Plan   - Patient received nifedipine in the clinic. Pressures in the clinic at 190s SBO. Pressures upon triage in 150s/80s. Continue to monitor BPs.   - IVF  - IV Tylenol for pain management (headache). Reassess pain following medication.   - HELLP labs  - Continuous EFM  - Administer BTX  - Start Mg   37yo  with PMHx of sickle cell anemia (w/ last hospitalization for pain crisis on ) at 30w3d presents with pre eclampsia with severe features. s/p Proc 10 in office for sustained severe range BPs. History of ICP this pregnancy with elevated liver enzymes.       #sPEC  - Mg for seizure ppx  - s/p Procardia 10 IR in clinic  - s/p Procardia 20 IR upon arrival to triage   - Procardia 30XL for BP control  - f/u STAT HELLP labs   - AM HELLP labs  - Monitor BPs  - BMZ for FLM    #Fetal wellbeing  - BMZ/Monitoring as above  - NICU consult  - f/u GBS    #Maternal wellbeing  - NPO   - holding HSQ   - PNV      Veronika Ferro, PGY3

## 2023-06-20 NOTE — PROGRESS NOTE ADULT - SUBJECTIVE AND OBJECTIVE BOX
[facility] Admission     37yo  with a history of sickle cell anemia, early diagnosis of ICP and history of sPEC in 2 prior pregnancies, now 30w3d presenting for and elevated blood pressures in clinic. Patient reports that she was seen yesterday in the ER at [facility] for headache and body pain and given IV pain medications for a presumed sickle cell crisis. Today she continued to have the headache. She states this feels different and she was worried. She denies any visual changes or abdominal pain.     Interval events: Patient had received 10 procardia IR at [facility] today   Upon arrival to L&D she had two severe range BP and received 10 procardia IR     ATU (): breech, posterior placenta, EFW 1399g EFW 12%, AC 10%, ESPERANZA 9.9, elevated dopplers 4.89, BPP 8/8    OBHx: SAB w/ D&C (); elective termination w/ D&C (); elective termination w/ medication ();  () at 34w IOL d/t sPEC and complicated by ICP;  () at 33w IOL d/t sPEC and complicated by ICP    PMH: sickle cell disease (most recent pain crisis yesterday ; prior pain crisis 2023 managed with pain medications, IVF, and transfusion) Patient has a baseline hemoglobin of 8.     PSH: lap cholecystectomy (); lap colectomy d/t volvulus ()    Meds: PNV, folic acid 1 mg daily, ursodiol 500mg TID, ASA 81mg      ICU Vital Signs Last 24 Hrs  T(C): 36.6 (2023 14:23), Max: 36.6 (2023 14:23)  T(F): 97.9 (2023 14:23), Max: 97.9 (2023 14:23)  HR: 139 (2023 17:23) (60 - 146)  BP: 113/59 (2023 17:23) (102/57 - 174/88)  RR: 16 (2023 14:23) (16 - 16)  SpO2: 94% (2023 17:22) (90% - 96%)

## 2023-06-20 NOTE — OB PROVIDER TRIAGE NOTE - NSMATERNALFETALCONCERNS_OBGYN_ALL_OB_FT
Maternal/Fetal Alert  4/26/23 - Maternal sickle cell.  FOB declined testing.  Notify pediatrician. -Ifeoma Acevedo RNC

## 2023-06-20 NOTE — OB RN INTRAOPERATIVE NOTE - NSSELHIDDEN_OBGYN_ALL_OB_FT
[NS_DeliveryAttending1_OBGYN_ALL_OB_FT:[id number]],[NS_DeliveryAttending2_OBGYN_ALL_OB_FT:[id number]],[NS_DeliveryAssist1_OBGYN_ALL_OB_FT:[id number]],[NS_DeliveryRN_OBGYN_ALL_OB_FT:[id number]]

## 2023-06-20 NOTE — OB RN DELIVERY SUMMARY - NSSELHIDDEN_OBGYN_ALL_OB_FT
[NS_DeliveryAttending1_OBGYN_ALL_OB_FT:[id number]],[NS_DeliveryAttending2_OBGYN_ALL_OB_FT:[id number]],[NS_DeliveryAssist1_OBGYN_ALL_OB_FT:[id number]],[NS_DeliveryRN_OBGYN_ALL_OB_FT:[id number]]

## 2023-06-20 NOTE — CHART NOTE - NSCHARTNOTEFT_GEN_A_CORE
Called to bedside as fetal bradycardia noted along with maternal hypotension. Anesthesia requested at bedside to assist with BP management. Ultrasound used to confirm fetal bradycardia.     Syed given and maternal BP improved, however fetal bradycardia still noted. Decision made to proceed to the OR for emergency  section. Patient counseled on need for emergent delivery while moving to the OR and verbalized understanding and consent to emergency  section.   Carly Hirschberg, MFM Fellow   Patient seen at bedside with [facility] Attending and Dr. Jeronimo,

## 2023-06-20 NOTE — OB NEONATOLOGY/PEDIATRICIAN DELIVERY SUMMARY - NSPEDSNEONOTESA_OBGYN_ALL_OB_FT
Baby girl born at 30 3/7 wks via STAT C/s to a 37 y/o  mother who is O+ blood type all other labs pending at this time. Maternal history of sickle cell anemia. Prenatal history of ICP and sPEC on ursodiol. AROM at delivery with clear fluids and terminal mec. Infant emerged with poor respiratory effort and good tone. Infant brought to warmer and placed in poncho. PPV  25/6 initiated for 1-2 mins with good chest rise. Infant transported to NICU in a warmer on CPAP 6 30%

## 2023-06-20 NOTE — OB PROVIDER TRIAGE NOTE - NSHPPHYSICALEXAM_GEN_ALL_CORE
Objective  VS  T(C): 36.6 (06-20-23 @ 14:23)  HR: 73 (06-20-23 @ 15:44)  BP: 151/85 (06-20-23 @ 15:30)  RR: 16 (06-20-23 @ 14:23)  SpO2: 96% (06-20-23 @ 15:44)    PE:   CV: clinically well perfused  Pulm: breathing comfortably on RA  Abd: gravid, nontender  Extr: moving all extremities with ease, mild swelling ankles b/l

## 2023-06-20 NOTE — OB PROVIDER H&P - HISTORY OF PRESENT ILLNESS
Subjective  HPI: 37yo  with PMHx of sickle cell anemia (w/ last hospitalization for pain crisis on ) at 30w3d presents for 10/10 severe headache and elevated blood pressures in clinic. +FM. -LOF. -CTXs. -VB. Patient reports that she was seen at [facility] yesterday  due to pain crisis in the setting of her sickle cell disease. She was not admitted, but was managed with pain medication and IV fluids, which has improved her symptoms, which predominantly consisted of back pain. Today, she began to have a 10/10 constant headache around 130pm. She went to the clinic at the time for her scheduled OB visit and was found to have elevated BPs to 190s/80s on multiple readings. She was managed with nifedipine and transferred to [facility] L&D. Patient denies visual changes such as black spots, floaters, or blurry vision. She denies SOB/difficulty breathing, RUQ pain, N/V. Patient reports mild to moderate swelling of the ankles and knees. She denies any other concerns.     PNC: Blood pressures reportedly wnl until today in this current pregnancy. Diagnosed with cholestasis of pregnancy (managed on ursodiol 500mg daily); prior hx of PEC in last 2 pregnancies (induced due to severe range pressures). GBS unknown.  EFW 1500g by paulino.  OBHx: SAB w/ D&C (); elective termination w/ D&C (); elective termination w/ medication ();  () at 36w IOL d/t PEC;  () at 28w IOL d/t PEC  GynHx: denies hx of fibroids, cysts, or polyps; abnormal pap w/ + HPV (procedure completed for management) (); +hx of chlamydia in adolescence (treated with antibiotics at the time); denies hx of other STD/STIs  PMH: sickle cell disease (most recent pain crisis yesterday ; prior pain crisis 2023 managed with pain medications, IVF, and transfusion)  PSH: lap cholecystectomy (); lap colectomy d/t volvulus ()  Meds: PNV, folic acid 1 mg daily, ursodiol 500mg TID, ASA 81mg  Allergies: NKDA  Social: denies substance use, lives at home with two children; stay at home mom  Will accept blood transfusions? Yes             HPI: 37yo  with PMHx of sickle cell anemia (w/ last hospitalization for pain crisis on ) at 30w3d presents for and elevated blood pressures in clinic. +FM. -LOF. -CTXs. -VB. Patient reports that she was seen at [facility] yesterday  due to sickle cell pain crisis in the setting of her sickle cell disease. She was not admitted, but was managed with pain medication and IV fluids, which has improved her symptoms, which predominantly consisted of back pain. Today, she began to have a 10/10 constant headache around 130pm. She went to the clinic at the time for her scheduled OB visit and was found to have elevated BPs to 190s/80s on multiple readings. She was managed with Procardia 10IR and transferred to [facility] L&D. Patient denies visual changes such as black spots, floaters, or blurry vision. She denies SOB/difficulty breathing, RUQ pain, N/V. Patient reports mild to moderate swelling of the ankles and knees. She denies any other concerns.     ATU (): breech, posterior placenta, EFW 1399g EFW 12%, AC 10%, ESPERANZA 9.9, elevated dopplers 4.89, BPP 8/8  PNC:   - ICP( managed on ursodiol 500mg daily), elevated liver enzymes;   - prior hx of PEC in last 2 pregnancies (induced due to severe range pressures).   - sickle cell disease - last crisis currently     GBS unknown.    EFW 1399g by paulino.  OBHx: SAB w/ D&C (); elective termination w/ D&C (); elective termination w/ medication ();  () at 34w IOL d/t PEC;  () at 33w IOL d/t PEC  GynHx: denies hx of fibroids, cysts, or polyps; abnormal pap w/ + HPV (procedure completed for management) (); +hx of chlamydia in adolescence (treated with antibiotics at the time); denies hx of other STD/STIs  PMH: sickle cell disease (most recent pain crisis yesterday ; prior pain crisis 2023 managed with pain medications, IVF, and transfusion)  PSH: lap cholecystectomy (); lap colectomy d/t volvulus ()  Meds: PNV, folic acid 1 mg daily, ursodiol 500mg TID, ASA 81mg  Allergies: NKDA  Social: denies substance use, lives at home with two children; stay at home mom  Will accept blood transfusions? Yes

## 2023-06-20 NOTE — OB RN INTRAOPERATIVE NOTE - NS_ADDITIONALPERSONNEL_OBGYN_ALL_OB_FT
Zach VILLALBA, Kurt VILLALBA, Todd HERRERA Zach VILLALBA, Kurt VILLALBA, Todd HERRERA, Valery CRUZ, Hirschbergn MD

## 2023-06-21 LAB
ALBUMIN SERPL ELPH-MCNC: 2.6 G/DL — LOW (ref 3.3–5)
ALP SERPL-CCNC: 230 U/L — HIGH (ref 40–120)
ALT FLD-CCNC: 69 U/L — HIGH (ref 10–45)
ANION GAP SERPL CALC-SCNC: 14 MMOL/L — SIGNIFICANT CHANGE UP (ref 5–17)
APTT BLD: 24.6 SEC — LOW (ref 27.5–35.5)
AST SERPL-CCNC: 122 U/L — HIGH (ref 10–40)
BILIRUB SERPL-MCNC: 4.4 MG/DL — HIGH (ref 0.2–1.2)
BUN SERPL-MCNC: 7 MG/DL — SIGNIFICANT CHANGE UP (ref 7–23)
CALCIUM SERPL-MCNC: 8.3 MG/DL — LOW (ref 8.4–10.5)
CHLORIDE SERPL-SCNC: 104 MMOL/L — SIGNIFICANT CHANGE UP (ref 96–108)
CO2 SERPL-SCNC: 17 MMOL/L — LOW (ref 22–31)
CREAT SERPL-MCNC: 0.66 MG/DL — SIGNIFICANT CHANGE UP (ref 0.5–1.3)
EGFR: 114 ML/MIN/1.73M2 — SIGNIFICANT CHANGE UP
FIBRINOGEN PPP-MCNC: 603 MG/DL — HIGH (ref 200–445)
GLUCOSE SERPL-MCNC: 131 MG/DL — HIGH (ref 70–99)
HCT VFR BLD CALC: 25 % — LOW (ref 34.5–45)
HGB BLD-MCNC: 8.7 G/DL — LOW (ref 11.5–15.5)
INR BLD: 0.82 RATIO — LOW (ref 0.88–1.16)
LDH SERPL L TO P-CCNC: 1160 U/L — HIGH (ref 50–242)
MAGNESIUM SERPL-MCNC: 6.4 MG/DL — HIGH (ref 1.6–2.6)
MAGNESIUM SERPL-MCNC: 7.3 MG/DL — HIGH (ref 1.6–2.6)
MAGNESIUM SERPL-MCNC: 8.1 MG/DL — HIGH (ref 1.6–2.6)
MCHC RBC-ENTMCNC: 33.7 PG — SIGNIFICANT CHANGE UP (ref 27–34)
MCHC RBC-ENTMCNC: 34.8 GM/DL — SIGNIFICANT CHANGE UP (ref 32–36)
MCV RBC AUTO: 96.9 FL — SIGNIFICANT CHANGE UP (ref 80–100)
NRBC # BLD: 49 /100 WBCS — HIGH (ref 0–0)
PLATELET # BLD AUTO: 362 K/UL — SIGNIFICANT CHANGE UP (ref 150–400)
POTASSIUM SERPL-MCNC: 4.4 MMOL/L — SIGNIFICANT CHANGE UP (ref 3.5–5.3)
POTASSIUM SERPL-SCNC: 4.4 MMOL/L — SIGNIFICANT CHANGE UP (ref 3.5–5.3)
PROT SERPL-MCNC: 6.3 G/DL — SIGNIFICANT CHANGE UP (ref 6–8.3)
PROTHROM AB SERPL-ACNC: 9.5 SEC — LOW (ref 10.5–13.4)
RBC # BLD: 2.58 M/UL — LOW (ref 3.8–5.2)
RBC # FLD: 24.4 % — HIGH (ref 10.3–14.5)
SODIUM SERPL-SCNC: 135 MMOL/L — SIGNIFICANT CHANGE UP (ref 135–145)
URATE SERPL-MCNC: 7.9 MG/DL — HIGH (ref 2.5–7)
WBC # BLD: 18.49 K/UL — HIGH (ref 3.8–10.5)
WBC # FLD AUTO: 18.49 K/UL — HIGH (ref 3.8–10.5)

## 2023-06-21 PROCEDURE — 71275 CT ANGIOGRAPHY CHEST: CPT | Mod: 26

## 2023-06-21 RX ORDER — ACETAMINOPHEN 500 MG
1000 TABLET ORAL ONCE
Refills: 0 | Status: COMPLETED | OUTPATIENT
Start: 2023-06-21 | End: 2023-06-21

## 2023-06-21 RX ORDER — IBUPROFEN 200 MG
600 TABLET ORAL EVERY 6 HOURS
Refills: 0 | Status: DISCONTINUED | OUTPATIENT
Start: 2023-06-21 | End: 2023-06-25

## 2023-06-21 RX ORDER — MAGNESIUM SULFATE 500 MG/ML
1 VIAL (ML) INJECTION
Qty: 40 | Refills: 0 | Status: DISCONTINUED | OUTPATIENT
Start: 2023-06-21 | End: 2023-06-25

## 2023-06-21 RX ADMIN — Medication 1000 MILLIGRAM(S): at 13:00

## 2023-06-21 RX ADMIN — Medication 1000 MILLIGRAM(S): at 00:20

## 2023-06-21 RX ADMIN — Medication 1000 MILLIGRAM(S): at 18:40

## 2023-06-21 RX ADMIN — Medication 400 MILLIGRAM(S): at 18:00

## 2023-06-21 RX ADMIN — HYDROMORPHONE HYDROCHLORIDE 30 MILLILITER(S): 2 INJECTION INTRAMUSCULAR; INTRAVENOUS; SUBCUTANEOUS at 19:51

## 2023-06-21 RX ADMIN — Medication 100 MILLIGRAM(S): at 03:24

## 2023-06-21 RX ADMIN — Medication 30 MILLIGRAM(S): at 15:30

## 2023-06-21 RX ADMIN — Medication 30 MILLIGRAM(S): at 14:58

## 2023-06-21 RX ADMIN — Medication 975 MILLIGRAM(S): at 05:42

## 2023-06-21 RX ADMIN — Medication 100 MILLIGRAM(S): at 12:24

## 2023-06-21 RX ADMIN — HYDROMORPHONE HYDROCHLORIDE 30 MILLILITER(S): 2 INJECTION INTRAMUSCULAR; INTRAVENOUS; SUBCUTANEOUS at 01:23

## 2023-06-21 RX ADMIN — HYDROMORPHONE HYDROCHLORIDE 30 MILLILITER(S): 2 INJECTION INTRAMUSCULAR; INTRAVENOUS; SUBCUTANEOUS at 07:23

## 2023-06-21 RX ADMIN — Medication 400 MILLIGRAM(S): at 12:24

## 2023-06-21 RX ADMIN — Medication 975 MILLIGRAM(S): at 06:12

## 2023-06-21 RX ADMIN — Medication 30 MILLIGRAM(S): at 08:57

## 2023-06-21 RX ADMIN — Medication 30 MILLIGRAM(S): at 03:18

## 2023-06-21 RX ADMIN — HYDROMORPHONE HYDROCHLORIDE 30 MILLILITER(S): 2 INJECTION INTRAMUSCULAR; INTRAVENOUS; SUBCUTANEOUS at 00:28

## 2023-06-21 RX ADMIN — Medication 30 MILLIGRAM(S): at 02:48

## 2023-06-21 RX ADMIN — Medication 30 MILLIGRAM(S): at 09:15

## 2023-06-21 RX ADMIN — Medication 1000 MILLIUNIT(S)/MIN: at 00:40

## 2023-06-21 RX ADMIN — Medication 600 MILLIGRAM(S): at 22:28

## 2023-06-21 RX ADMIN — HEPARIN SODIUM 5000 UNIT(S): 5000 INJECTION INTRAVENOUS; SUBCUTANEOUS at 09:44

## 2023-06-21 RX ADMIN — Medication 100 MILLIGRAM(S): at 20:39

## 2023-06-21 RX ADMIN — Medication 600 MILLIGRAM(S): at 21:44

## 2023-06-21 RX ADMIN — SIMETHICONE 80 MILLIGRAM(S): 80 TABLET, CHEWABLE ORAL at 08:56

## 2023-06-21 RX ADMIN — HEPARIN SODIUM 5000 UNIT(S): 5000 INJECTION INTRAVENOUS; SUBCUTANEOUS at 21:44

## 2023-06-21 RX ADMIN — Medication 30 MILLIGRAM(S): at 16:29

## 2023-06-21 NOTE — PROGRESS NOTE ADULT - SUBJECTIVE AND OBJECTIVE BOX
Day 1 of Anesthesia Pain Management Service    SUBJECTIVE: I'm doing ok    Pain Scale Score:	[X] Refer to charted pain scores    THERAPY:    [ ] IV PCA Morphine		[ ] 5 mg/mL	[ ] 1 mg/mL  [X] IV PCA Hydromorphone	[ ] 5 mg/mL	[X] 1 mg/mL  [ ] IV PCA Fentanyl		[ ] 50 micrograms/mL    Demand dose: 0.2 mg     Lockout: 6 minutes   Continuous Rate: 0 mg/hr  4 Hour Limit: 4 mg    MEDICATIONS  (STANDING):  acetaminophen     Tablet .. 975 milliGRAM(s) Oral <User Schedule>  ceFAZolin   IVPB 2000 milliGRAM(s) IV Intermittent every 8 hours  diphtheria/tetanus/pertussis (acellular) Vaccine (Adacel) 0.5 milliLiter(s) IntraMuscular once  heparin   Injectable 5000 Unit(s) SubCutaneous two times a day  HYDROmorphone PCA (1 mG/mL) 30 milliLiter(s) PCA Continuous PCA Continuous  ibuprofen  Tablet. 600 milliGRAM(s) Oral every 6 hours  ketorolac   Injectable 30 milliGRAM(s) IV Push every 6 hours  lactated ringers. 1000 milliLiter(s) (125 mL/Hr) IV Continuous <Continuous>  NIFEdipine XL 30 milliGRAM(s) Oral daily  oxytocin Infusion 333.333 milliUNIT(s)/Min (1000 mL/Hr) IV Continuous <Continuous>  sodium chloride 0.9%. 1000 milliLiter(s) (125 mL/Hr) IV Continuous <Continuous>    MEDICATIONS  (PRN):  diphenhydrAMINE 25 milliGRAM(s) Oral every 6 hours PRN Pruritus  HYDROmorphone   Tablet 2 milliGRAM(s) Oral every 6 hours PRN Severe Pain (7 - 10)  HYDROmorphone PCA (1 mG/mL) Rescue Clinician Bolus 0.5 milliGRAM(s) IV Push every 15 minutes PRN for Pain Scale GREATER THAN 6  lanolin Ointment 1 Application(s) Topical every 6 hours PRN Sore Nipples  magnesium hydroxide Suspension 30 milliLiter(s) Oral two times a day PRN Constipation  nalbuphine Injectable 2.5 milliGRAM(s) IV Push every 6 hours PRN Pruritus  naloxone Injectable 0.1 milliGRAM(s) IV Push every 3 minutes PRN For ANY of the following changes in patient status:  A. RR LESS THAN 10 breaths per minute, B. Oxygen saturation LESS THAN 90%, C. Sedation score of 6  ondansetron Injectable 4 milliGRAM(s) IV Push every 6 hours PRN Nausea  oxyCODONE    IR 5 milliGRAM(s) Oral once PRN Moderate to Severe Pain (4-10)  oxyCODONE    IR 5 milliGRAM(s) Oral every 3 hours PRN Moderate to Severe Pain (4-10)  simethicone 80 milliGRAM(s) Chew every 4 hours PRN Gas      OBJECTIVE:    Sedation Score:	[ X] Alert 	[ ] Drowsy 	[ ] Arousable	[ ] Asleep	[ ] Unresponsive    Side Effects:	[X ] None	[ ] Nausea	[ ] Vomiting	[ ] Pruritus  		[ ] Other:    Vital Signs Last 24 Hrs  T(C): 36.5 (21 Jun 2023 08:53), Max: 36.8 (20 Jun 2023 17:44)  T(F): 97.7 (21 Jun 2023 08:53), Max: 98.2 (20 Jun 2023 17:44)  HR: 91 (21 Jun 2023 08:53) (60 - 146)  BP: 111/68 (21 Jun 2023 08:53) (102/57 - 174/88)  BP(mean): 83 (20 Jun 2023 23:50) (83 - 108)  RR: 15 (21 Jun 2023 08:53) (15 - 18)  SpO2: 89% (21 Jun 2023 08:53) (89% - 99%)    Parameters below as of 21 Jun 2023 05:26  Patient On (Oxygen Delivery Method): room air        ASSESSMENT/ PLAN    Therapy to  be:               [X] Continued   [ ] Discontinued   [ ] Changed to PRN Analgesics    Documentation and Verification of current medications:   [X] Done	[ ] Not done, not eligible    Comments: On Magnesium gtt. Total PCA use 6.4mg / 13 hours, Reeducated to use.

## 2023-06-21 NOTE — CONSULT NOTE ADULT - ASSESSMENT
HPI: 39yo  with PMHx of sickle cell anemia (w/ last hospitalization for pain crisis on ) at 30w3d presents for and elevated blood pressures in clinic    SCD w/ crisis  --follows with Dr Riojas of [facility]  --c/w folic acid  --on PCA, IVF  --lungs clear on CTA chest done    --trend LDH, retic count  --h/h stable, transfuse for  Hgb <7  --ongoing care after discharge      will follow,    Monica Sandy NP  Hematology/ Oncology  [facility]  [phone] (office)  [phone] (alt office)  Evenings and weekends please call MD on call or office   HPI: 37yo  with PMHx of sickle cell anemia (w/ last hospitalization for pain crisis on ) at 30w3d presents for and elevated blood pressures in clinic    SCD w/ crisis  --follows with Dr Riojas of [facility]  --c/w folic acid  --on PCA, IVF  --lungs clear on CTA chest done    --trend LDH, retic count  --h/h stable, transfuse for  Hgb <7  --ongoing care after discharge    SOB  - cta negative for PE   - no consolidation       will follow,    Monica Sandy NP  Hematology/ Oncology  [facility]  [phone] (office)  [phone] (alt office)  Evenings and weekends please call MD on call or office

## 2023-06-21 NOTE — CONSULT NOTE ADULT - NS ATTEND AMEND GEN_ALL_CORE FT
pt with sickle cell disease disease. hg stable. no signs of acute chest syndrome. spo2 93% on room air. Has abd pain likely from C section. cont o2 therapy. will cont to follow cbc .

## 2023-06-21 NOTE — PROVIDER CONTACT NOTE (OTHER) - RECOMMENDATIONS
Dr. Campbell recommends ambulate patient. RN advised Dr. Campbell patient feeling weak, tired and vomiting. Mag level is pending. Requested waiting until next set of Vitals.
MD to come assess patient.
Notify MD
MD notified
Notify MD

## 2023-06-21 NOTE — PROVIDER CONTACT NOTE (OTHER) - REASON
Patinet with improved temperature now vomited 300cc of dark green fluid. Michael with improved temperature but low oxygen saturation at rest

## 2023-06-21 NOTE — PROGRESS NOTE ADULT - SUBJECTIVE AND OBJECTIVE BOX
Patient seen and examined at bedside, no acute overnight events following transfer to postpartum floor. No acute complaints, pain well controlled. Patient has not ambulated or passed flatus. Passed small amount of regular diet. Paniagua is still in place. Denies CP, SOB, N/V, HA, blurred vision, epigastric pain.    Vital Signs Last 24 Hours  T(C): 36.4 (06-21-23 @ 03:30), Max: 36.8 (06-20-23 @ 17:44)  HR: 75 (06-21-23 @ 03:30) (60 - 146)  BP: 135/80 (06-21-23 @ 03:30) (102/57 - 174/88)  RR: 16 (06-21-23 @ 03:30) (16 - 18)  SpO2: 92% (06-21-23 @ 03:30) (90% - 99%)    I&O's Summary    20 Jun 2023 07:01  -  21 Jun 2023 03:34  --------------------------------------------------------  IN: 725 mL / OUT: 5300 mL / NET: -4575 mL    Physical Exam:  General: NAD  Abdomen: Soft, non-tender, non-distended, fundus firm  Incision: Pfannenstiel incision CDI, subcuticular suture closure  Pelvic: Lochia wnl, external exam of perineum clean and dry without swelling  : paniagua draining clear yellow urine    Labs:  Blood Type: O Positive  Antibody Screen: Negative               9.1    12.47 )-----------( 396      ( 06-20 @ 16:49 )             26.1                8.4    14.62 )-----------( 435      ( 05-30 @ 12:50 )             24.9     MEDICATIONS  (STANDING):  acetaminophen     Tablet .. 975 milliGRAM(s) Oral <User Schedule>  ceFAZolin   IVPB 2000 milliGRAM(s) IV Intermittent every 8 hours  diphtheria/tetanus/pertussis (acellular) Vaccine (Adacel) 0.5 milliLiter(s) IntraMuscular once  heparin   Injectable 5000 Unit(s) SubCutaneous two times a day  HYDROmorphone PCA (1 mG/mL) 30 milliLiter(s) PCA Continuous PCA Continuous  ibuprofen  Tablet. 600 milliGRAM(s) Oral every 6 hours  ketorolac   Injectable 30 milliGRAM(s) IV Push every 6 hours  lactated ringers. 1000 milliLiter(s) (125 mL/Hr) IV Continuous <Continuous>  NIFEdipine XL 30 milliGRAM(s) Oral daily  oxytocin Infusion 333.333 milliUNIT(s)/Min (1000 mL/Hr) IV Continuous <Continuous>  sodium chloride 0.9%. 1000 milliLiter(s) (125 mL/Hr) IV Continuous <Continuous>    MEDICATIONS  (PRN):  diphenhydrAMINE 25 milliGRAM(s) Oral every 6 hours PRN Pruritus  HYDROmorphone   Tablet 2 milliGRAM(s) Oral every 6 hours PRN Severe Pain (7 - 10)  HYDROmorphone PCA (1 mG/mL) Rescue Clinician Bolus 0.5 milliGRAM(s) IV Push every 15 minutes PRN for Pain Scale GREATER THAN 6  lanolin Ointment 1 Application(s) Topical every 6 hours PRN Sore Nipples  magnesium hydroxide Suspension 30 milliLiter(s) Oral two times a day PRN Constipation  nalbuphine Injectable 2.5 milliGRAM(s) IV Push every 6 hours PRN Pruritus  naloxone Injectable 0.1 milliGRAM(s) IV Push every 3 minutes PRN For ANY of the following changes in patient status:  A. RR LESS THAN 10 breaths per minute, B. Oxygen saturation LESS THAN 90%, C. Sedation score of 6  ondansetron Injectable 4 milliGRAM(s) IV Push every 6 hours PRN Nausea  oxyCODONE    IR 5 milliGRAM(s) Oral once PRN Moderate to Severe Pain (4-10)  oxyCODONE    IR 5 milliGRAM(s) Oral every 3 hours PRN Moderate to Severe Pain (4-10)  simethicone 80 milliGRAM(s) Chew every 4 hours PRN Gas Patient seen and examined at bedside, no acute overnight events following transfer to postpartum floor. No acute complaints, pain improved with medications. Patient has not ambulated or passed flatus. Passed small amount of liquids without nausea/vomiting. Paniagua is still in place. Denies CP, SOB, N/V, HA, blurred vision, epigastric pain.    Vital Signs Last 24 Hours  T(C): 36.4 (06-21-23 @ 03:30), Max: 36.8 (06-20-23 @ 17:44)  HR: 75 (06-21-23 @ 03:30) (60 - 146)  BP: 135/80 (06-21-23 @ 03:30) (102/57 - 174/88)  RR: 16 (06-21-23 @ 03:30) (16 - 18)  SpO2: 92% (06-21-23 @ 03:30) (90% - 99%)    I&O's Summary    20 Jun 2023 07:01  -  21 Jun 2023 03:34  --------------------------------------------------------  IN: 725 mL / OUT: 5300 mL / NET: -4575 mL    Physical Exam:  General: NAD  Abdomen: Soft, non-tender, non-distended, fundus firm  Incision: Pfannenstiel incision CDI, subcuticular suture closure  Pelvic: Lochia wnl, external exam of perineum clean and dry without swelling  : paniagua draining clear yellow urine    Labs:  Blood Type: O Positive  Antibody Screen: Negative               9.1    12.47 )-----------( 396      ( 06-20 @ 16:49 )             26.1                8.4    14.62 )-----------( 435      ( 05-30 @ 12:50 )             24.9     MEDICATIONS  (STANDING):  acetaminophen     Tablet .. 975 milliGRAM(s) Oral <User Schedule>  ceFAZolin   IVPB 2000 milliGRAM(s) IV Intermittent every 8 hours  diphtheria/tetanus/pertussis (acellular) Vaccine (Adacel) 0.5 milliLiter(s) IntraMuscular once  heparin   Injectable 5000 Unit(s) SubCutaneous two times a day  HYDROmorphone PCA (1 mG/mL) 30 milliLiter(s) PCA Continuous PCA Continuous  ibuprofen  Tablet. 600 milliGRAM(s) Oral every 6 hours  ketorolac   Injectable 30 milliGRAM(s) IV Push every 6 hours  lactated ringers. 1000 milliLiter(s) (125 mL/Hr) IV Continuous <Continuous>  NIFEdipine XL 30 milliGRAM(s) Oral daily  oxytocin Infusion 333.333 milliUNIT(s)/Min (1000 mL/Hr) IV Continuous <Continuous>  sodium chloride 0.9%. 1000 milliLiter(s) (125 mL/Hr) IV Continuous <Continuous>    MEDICATIONS  (PRN):  diphenhydrAMINE 25 milliGRAM(s) Oral every 6 hours PRN Pruritus  HYDROmorphone   Tablet 2 milliGRAM(s) Oral every 6 hours PRN Severe Pain (7 - 10)  HYDROmorphone PCA (1 mG/mL) Rescue Clinician Bolus 0.5 milliGRAM(s) IV Push every 15 minutes PRN for Pain Scale GREATER THAN 6  lanolin Ointment 1 Application(s) Topical every 6 hours PRN Sore Nipples  magnesium hydroxide Suspension 30 milliLiter(s) Oral two times a day PRN Constipation  nalbuphine Injectable 2.5 milliGRAM(s) IV Push every 6 hours PRN Pruritus  naloxone Injectable 0.1 milliGRAM(s) IV Push every 3 minutes PRN For ANY of the following changes in patient status:  A. RR LESS THAN 10 breaths per minute, B. Oxygen saturation LESS THAN 90%, C. Sedation score of 6  ondansetron Injectable 4 milliGRAM(s) IV Push every 6 hours PRN Nausea  oxyCODONE    IR 5 milliGRAM(s) Oral once PRN Moderate to Severe Pain (4-10)  oxyCODONE    IR 5 milliGRAM(s) Oral every 3 hours PRN Moderate to Severe Pain (4-10)  simethicone 80 milliGRAM(s) Chew every 4 hours PRN Gas

## 2023-06-21 NOTE — PROGRESS NOTE ADULT - SUBJECTIVE AND OBJECTIVE BOX
[facility] Progress Note     Patient seen and examined at bedside. She reports not feeling great. At this time her pain is all incisional. She denies body aches. Patient reports that her headache resolved since delivery.     ICU Vital Signs Last 24 Hrs  T(C): 36.5 (21 Jun 2023 08:53), Max: 36.8 (20 Jun 2023 17:44)  T(F): 97.7 (21 Jun 2023 08:53), Max: 98.2 (20 Jun 2023 17:44)  HR: 91 (21 Jun 2023 08:53) (60 - 146)  BP: 111/68 (21 Jun 2023 08:53) (102/57 - 174/88)  BP(mean): 83 (20 Jun 2023 23:50) (83 - 108)  RR: 15 (21 Jun 2023 08:53) (15 - 18)  SpO2: 89% (21 Jun 2023 08:53) (89% - 99%)    O2 Parameters below as of 21 Jun 2023 05:26  Patient On (Oxygen Delivery Method): room air                          8.7    18.49 )-----------( 362      ( 21 Jun 2023 07:33 )             25.0   06-21    135  |  104  |  7   ----------------------------<  131<H>  4.4   |  17<L>  |  0.66    Ca    8.3<L>      21 Jun 2023 07:33  Mg     8.1     06-21    TPro  6.3  /  Alb  2.6<L>  /  TBili  4.4<H>  /  DBili  x   /  AST  122<H>  /  ALT  69<H>  /  AlkPhos  230<H>  06-21   Patient is a 77y old Female subacute SDH right to left midline shift , extensive  pmh  of HTN, type II DM, CAD, A-Fib (on Eliquis), HFpEF, endocarditis, COPD (on 2 L home O2), DVT, CKD, GERD, and depression who presented with a chief complaint of Progressive SOB and lower extremity edema. Pt. comes from nursing home s/p fall on Eliquis 2.5mg.        ED: In  the  ED the patient was noted to be hypotensive, tachycardic, tachypneic, and hypoxic. She was placed on a NRB mask and subsequently switched to a venturi mask where her SpO2 was ~93%. She received nebulizer treatment, Lasix 40 mg IV, Zosyn, Vancomycin, and Azithromycin in the ED.      On 18  pt  was admitted to the ICU where she was given a single does of PCC for  anti coag reversal. CT  head showed subacute right-sided SDH extending into anterior and posterior falx and right cerebellar region 1.6 cm in size, with right to left midline shift of 6 mm.     On 18  over night their was a change in mental status. Pt initially was lethargic but arousable. Pt was only responsive to painful stimuli. Eyes fixed b/l. Neurosurgery called to reeval pt. Neurosurgery attending and PA spoke to son and he refused to give consent for surgery. Pt then became hypoxic and desaturated to 80's on BiPAP. Decision was made to intubate pt for airway protection and HRF. Overnight resident spoke with daya Cross and he agreed to intubation.      18 -Currently hemodynamically stable intubated and sedated on 0.5mg of fentanyl. She remains in  ICU on MV, Right eye fixed and dilated with no pupillary reflex. S/p positive Urinary Catheter culture. Urinary Catheter was changed after positive culture specimen drawn on 18. Patient has very poor prognosis. Palliative care has spoken with son about extubation liberation on 18. Patient was extubated under palliative car team supervision and later .

## 2023-06-22 DIAGNOSIS — O26.619 LIVER AND BILIARY TRACT DISORDERS IN PREGNANCY, UNSPECIFIED TRIMESTER: ICD-10-CM

## 2023-06-22 DIAGNOSIS — R03.0 ELEVATED BLOOD-PRESSURE READING, WITHOUT DIAGNOSIS OF HYPERTENSION: ICD-10-CM

## 2023-06-22 DIAGNOSIS — D57.1 SICKLE-CELL DISEASE WITHOUT CRISIS: ICD-10-CM

## 2023-06-22 DIAGNOSIS — O09.299 SUPERVISION OF PREGNANCY WITH OTHER POOR REPRODUCTIVE OR OBSTETRIC HISTORY, UNSPECIFIED TRIMESTER: ICD-10-CM

## 2023-06-22 DIAGNOSIS — O09.529 SUPERVISION OF ELDERLY MULTIGRAVIDA, UNSPECIFIED TRIMESTER: ICD-10-CM

## 2023-06-22 LAB
ALBUMIN SERPL ELPH-MCNC: 2.3 G/DL — LOW (ref 3.3–5)
ALP SERPL-CCNC: 196 U/L — HIGH (ref 40–120)
ALT FLD-CCNC: 45 U/L — SIGNIFICANT CHANGE UP (ref 10–45)
ANION GAP SERPL CALC-SCNC: 16 MMOL/L — SIGNIFICANT CHANGE UP (ref 5–17)
ANISOCYTOSIS BLD QL: SIGNIFICANT CHANGE UP
APTT BLD: 25.7 SEC — LOW (ref 27.5–35.5)
AST SERPL-CCNC: 97 U/L — HIGH (ref 10–40)
BASOPHILS # BLD AUTO: 0 K/UL — SIGNIFICANT CHANGE UP (ref 0–0.2)
BASOPHILS NFR BLD AUTO: 0 % — SIGNIFICANT CHANGE UP (ref 0–2)
BILIRUB SERPL-MCNC: 2.6 MG/DL — HIGH (ref 0.2–1.2)
BUN SERPL-MCNC: 8 MG/DL — SIGNIFICANT CHANGE UP (ref 7–23)
CALCIUM SERPL-MCNC: 7.7 MG/DL — LOW (ref 8.4–10.5)
CHLORIDE SERPL-SCNC: 105 MMOL/L — SIGNIFICANT CHANGE UP (ref 96–108)
CO2 SERPL-SCNC: 21 MMOL/L — LOW (ref 22–31)
CREAT SERPL-MCNC: 0.81 MG/DL — SIGNIFICANT CHANGE UP (ref 0.5–1.3)
EGFR: 95 ML/MIN/1.73M2 — SIGNIFICANT CHANGE UP
ELLIPTOCYTES BLD QL SMEAR: SLIGHT — SIGNIFICANT CHANGE UP
EOSINOPHIL # BLD AUTO: 0.15 K/UL — SIGNIFICANT CHANGE UP (ref 0–0.5)
EOSINOPHIL NFR BLD AUTO: 0.9 % — SIGNIFICANT CHANGE UP (ref 0–6)
FIBRINOGEN PPP-MCNC: 524 MG/DL — HIGH (ref 200–445)
GLUCOSE SERPL-MCNC: 82 MG/DL — SIGNIFICANT CHANGE UP (ref 70–99)
HCT VFR BLD CALC: 21.8 % — LOW (ref 34.5–45)
HGB BLD-MCNC: 7.4 G/DL — LOW (ref 11.5–15.5)
HYPOCHROMIA BLD QL: SLIGHT — SIGNIFICANT CHANGE UP
INR BLD: 0.93 RATIO — SIGNIFICANT CHANGE UP (ref 0.88–1.16)
LDH SERPL L TO P-CCNC: 978 U/L — HIGH (ref 50–242)
LYMPHOCYTES # BLD AUTO: 16.2 % — SIGNIFICANT CHANGE UP (ref 13–44)
LYMPHOCYTES # BLD AUTO: 2.67 K/UL — SIGNIFICANT CHANGE UP (ref 1–3.3)
MACROCYTES BLD QL: SIGNIFICANT CHANGE UP
MANUAL SMEAR VERIFICATION: SIGNIFICANT CHANGE UP
MCHC RBC-ENTMCNC: 33.8 PG — SIGNIFICANT CHANGE UP (ref 27–34)
MCHC RBC-ENTMCNC: 33.9 GM/DL — SIGNIFICANT CHANGE UP (ref 32–36)
MCV RBC AUTO: 99.5 FL — SIGNIFICANT CHANGE UP (ref 80–100)
MONOCYTES # BLD AUTO: 1.48 K/UL — HIGH (ref 0–0.9)
MONOCYTES NFR BLD AUTO: 9 % — SIGNIFICANT CHANGE UP (ref 2–14)
NEUTROPHILS # BLD AUTO: 12.18 K/UL — HIGH (ref 1.8–7.4)
NEUTROPHILS NFR BLD AUTO: 73.9 % — SIGNIFICANT CHANGE UP (ref 43–77)
NRBC # BLD: 78 /100 — HIGH (ref 0–0)
PAPPENHEIMER BOD BLD QL SMEAR: PRESENT — SIGNIFICANT CHANGE UP
PLAT MORPH BLD: NORMAL — SIGNIFICANT CHANGE UP
PLATELET # BLD AUTO: 391 K/UL — SIGNIFICANT CHANGE UP (ref 150–400)
POIKILOCYTOSIS BLD QL AUTO: SIGNIFICANT CHANGE UP
POLYCHROMASIA BLD QL SMEAR: SIGNIFICANT CHANGE UP
POTASSIUM SERPL-MCNC: 4 MMOL/L — SIGNIFICANT CHANGE UP (ref 3.5–5.3)
POTASSIUM SERPL-SCNC: 4 MMOL/L — SIGNIFICANT CHANGE UP (ref 3.5–5.3)
PROT SERPL-MCNC: 6 G/DL — SIGNIFICANT CHANGE UP (ref 6–8.3)
PROTHROM AB SERPL-ACNC: 10.7 SEC — SIGNIFICANT CHANGE UP (ref 10.5–13.4)
RBC # BLD: 2.19 M/UL — LOW (ref 3.8–5.2)
RBC # FLD: 25.8 % — HIGH (ref 10.3–14.5)
RBC BLD AUTO: ABNORMAL
SICKLE CELLS BLD QL SMEAR: SIGNIFICANT CHANGE UP
SODIUM SERPL-SCNC: 142 MMOL/L — SIGNIFICANT CHANGE UP (ref 135–145)
TARGETS BLD QL SMEAR: SIGNIFICANT CHANGE UP
URATE SERPL-MCNC: 8.9 MG/DL — HIGH (ref 2.5–7)
WBC # BLD: 16.48 K/UL — HIGH (ref 3.8–10.5)
WBC # FLD AUTO: 16.48 K/UL — HIGH (ref 3.8–10.5)

## 2023-06-22 RX ADMIN — Medication 600 MILLIGRAM(S): at 17:00

## 2023-06-22 RX ADMIN — Medication 975 MILLIGRAM(S): at 02:28

## 2023-06-22 RX ADMIN — Medication 975 MILLIGRAM(S): at 02:12

## 2023-06-22 RX ADMIN — Medication 600 MILLIGRAM(S): at 11:35

## 2023-06-22 RX ADMIN — Medication 975 MILLIGRAM(S): at 21:30

## 2023-06-22 RX ADMIN — Medication 975 MILLIGRAM(S): at 20:57

## 2023-06-22 RX ADMIN — Medication 975 MILLIGRAM(S): at 08:39

## 2023-06-22 RX ADMIN — HYDROMORPHONE HYDROCHLORIDE 30 MILLILITER(S): 2 INJECTION INTRAMUSCULAR; INTRAVENOUS; SUBCUTANEOUS at 07:33

## 2023-06-22 RX ADMIN — HEPARIN SODIUM 5000 UNIT(S): 5000 INJECTION INTRAVENOUS; SUBCUTANEOUS at 22:00

## 2023-06-22 RX ADMIN — Medication 975 MILLIGRAM(S): at 15:15

## 2023-06-22 RX ADMIN — Medication 975 MILLIGRAM(S): at 14:37

## 2023-06-22 RX ADMIN — Medication 600 MILLIGRAM(S): at 16:05

## 2023-06-22 RX ADMIN — Medication 975 MILLIGRAM(S): at 09:00

## 2023-06-22 RX ADMIN — Medication 600 MILLIGRAM(S): at 04:35

## 2023-06-22 RX ADMIN — Medication 600 MILLIGRAM(S): at 10:59

## 2023-06-22 RX ADMIN — HEPARIN SODIUM 5000 UNIT(S): 5000 INJECTION INTRAVENOUS; SUBCUTANEOUS at 10:59

## 2023-06-22 RX ADMIN — Medication 30 MILLIGRAM(S): at 16:06

## 2023-06-22 NOTE — PROVIDER CONTACT NOTE (OTHER) - SITUATION
Pt feeling dizzy and Elevated BP
patient with improved temperature, patient with some drainage at incision site. Patient with low oxygen saturation, not improving past 92
Patient's temperature is 36.5 C. Patient's O2 sat is 92-93 % room air at rest.
removed bearkilliangger from patient for patient request as VSS now. Patient feeling tired and weak. RN educated patient on heparin and patient now agrees to take.
Patient's temp is 35.7. Patient complains of severe pain despite being on PCA Dilaudid.
Rectal temp 35.5 C and temporal temp is 36.2 C
Temperature is 35.7 C
Pt hypothermic to 94.3 rectally
Pt refusing heparin
Pt satting bw 88-92% on room air

## 2023-06-22 NOTE — PROVIDER CONTACT NOTE (OTHER) - BACKGROUND
P C/S crash for fetal bradycardia on PCA Dilaudid and mg 2g. Ancef q8 prophylaxis, hypothermic in RR. Baby in NICU for GA 30.3 weeks, no support at home.
S/p C/S delivery. Patient currently in sickle cell crisis.
S/p C/S delivery. Patient in sickle cell crisis and on mag sulfate for sPEC. Patient's temp has been low since entering OB PACU.
crash section
C/S s/p Mg day 3
Crash CS on PCA Dilaudid and on mg. Pt had low temps while in RR
S/p C/S delivery. Patient currently in sickle cell crisis.
S/p C/S delivery and currently in sickle cell crisis. Patient on mag sulfate for sPEC and patient on a PCA Dilaudid pump d/t being under General anesthesia.

## 2023-06-22 NOTE — PROGRESS NOTE ADULT - SUBJECTIVE AND OBJECTIVE BOX
Patient is a 38y old  Female who presents with a chief complaint of     Patient seen and examined at bedside. Reports she feels better, pain is 3/10. She used PCA pump 1x within the last day.    MEDICATIONS  (STANDING):  acetaminophen     Tablet .. 975 milliGRAM(s) Oral <User Schedule>  diphtheria/tetanus/pertussis (acellular) Vaccine (Adacel) 0.5 milliLiter(s) IntraMuscular once  heparin   Injectable 5000 Unit(s) SubCutaneous two times a day  HYDROmorphone PCA (1 mG/mL) 30 milliLiter(s) PCA Continuous PCA Continuous  ibuprofen  Tablet. 600 milliGRAM(s) Oral every 6 hours  lactated ringers. 1000 milliLiter(s) (125 mL/Hr) IV Continuous <Continuous>  magnesium sulfate Infusion 1 Gm/Hr (25 mL/Hr) IV Continuous <Continuous>  NIFEdipine XL 30 milliGRAM(s) Oral daily  oxytocin Infusion 333.333 milliUNIT(s)/Min (1000 mL/Hr) IV Continuous <Continuous>  sodium chloride 0.9%. 1000 milliLiter(s) (125 mL/Hr) IV Continuous <Continuous>    MEDICATIONS  (PRN):  diphenhydrAMINE 25 milliGRAM(s) Oral every 6 hours PRN Pruritus  HYDROmorphone   Tablet 2 milliGRAM(s) Oral every 6 hours PRN Severe Pain (7 - 10)  HYDROmorphone PCA (1 mG/mL) Rescue Clinician Bolus 0.5 milliGRAM(s) IV Push every 15 minutes PRN for Pain Scale GREATER THAN 6  lanolin Ointment 1 Application(s) Topical every 6 hours PRN Sore Nipples  magnesium hydroxide Suspension 30 milliLiter(s) Oral two times a day PRN Constipation  nalbuphine Injectable 2.5 milliGRAM(s) IV Push every 6 hours PRN Pruritus  naloxone Injectable 0.1 milliGRAM(s) IV Push every 3 minutes PRN For ANY of the following changes in patient status:  A. RR LESS THAN 10 breaths per minute, B. Oxygen saturation LESS THAN 90%, C. Sedation score of 6  ondansetron Injectable 4 milliGRAM(s) IV Push every 6 hours PRN Nausea  oxyCODONE    IR 5 milliGRAM(s) Oral every 3 hours PRN Moderate to Severe Pain (4-10)  oxyCODONE    IR 5 milliGRAM(s) Oral once PRN Moderate to Severe Pain (4-10)  simethicone 80 milliGRAM(s) Chew every 4 hours PRN Gas      Vital Signs Last 24 Hrs  T(C): 36.7 (22 Jun 2023 08:23), Max: 37 (22 Jun 2023 00:57)  T(F): 98.1 (22 Jun 2023 08:23), Max: 98.6 (22 Jun 2023 00:57)  HR: 81 (22 Jun 2023 08:23) (77 - 94)  BP: 147/88 (22 Jun 2023 08:23) (120/76 - 153/87)  BP(mean): --  RR: 18 (22 Jun 2023 08:23) (15 - 18)  SpO2: 94% (22 Jun 2023 08:23) (86% - 94%)    Parameters below as of 22 Jun 2023 08:23  Patient On (Oxygen Delivery Method): nasal cannula  O2 Flow (L/min): 4      PE  NAD  Awake, alert  Anicteric, MMM  No c/c/e  No rash grossly  FROM                          7.4    16.48 )-----------( 391      ( 22 Jun 2023 06:57 )             21.8       06-22    142  |  105  |  8   ----------------------------<  82  4.0   |  21<L>  |  0.81    Ca    7.7<L>      22 Jun 2023 06:54  Mg     7.3     06-21    TPro  6.0  /  Alb  2.3<L>  /  TBili  2.6<H>  /  DBili  x   /  AST  97<H>  /  ALT  45  /  AlkPhos  196<H>  06-22

## 2023-06-22 NOTE — PROGRESS NOTE ADULT - SUBJECTIVE AND OBJECTIVE BOX
Patient seen and examined at bedside, no acute overnight events. No acute complaints, pain well controlled, reports only needing to use PCA once. Patient is ambulating and tolerating regular diet. Has not yet passed flatus. Voiding spontaneously. Denies CP, SOB, N/V, HA, blurred vision, epigastric pain.    Vital Signs Last 24 Hours  T(C): 37 (06-22-23 @ 00:57), Max: 37 (06-22-23 @ 00:57)  HR: 94 (06-22-23 @ 00:57) (76 - 94)  BP: 144/83 (06-22-23 @ 00:57) (111/68 - 153/87)  RR: 16 (06-22-23 @ 00:57) (15 - 18)  SpO2: 91% (06-22-23 @ 00:57) (86% - 94%)    I&O's Summary    20 Jun 2023 07:01  -  21 Jun 2023 07:00  --------------------------------------------------------  IN: 2645 mL / OUT: 6700 mL / NET: -4055 mL    21 Jun 2023 07:01  -  22 Jun 2023 04:20  --------------------------------------------------------  IN: 1075 mL / OUT: 5250 mL / NET: -4175 mL    Physical Exam:  General: NAD  Abdomen: Soft, non-tender, non-distended, fundus firm  Incision: Pfannenstiel incision CDI, subcuticular suture closure  Pelvic: Lochia wnl, external exam of perineum clean and dry without swelling    Labs:  Blood Type: O Positive  Antibody Screen: Negative               8.7    18.49 )-----------( 362      ( 06-21 @ 07:33 )             25.0                9.1    12.47 )-----------( 396      ( 06-20 @ 16:49 )             26.1                8.4    14.62 )-----------( 435      ( 05-30 @ 12:50 )             24.9     MEDICATIONS  (STANDING):  acetaminophen     Tablet .. 975 milliGRAM(s) Oral <User Schedule>  diphtheria/tetanus/pertussis (acellular) Vaccine (Adacel) 0.5 milliLiter(s) IntraMuscular once  heparin   Injectable 5000 Unit(s) SubCutaneous two times a day  HYDROmorphone PCA (1 mG/mL) 30 milliLiter(s) PCA Continuous PCA Continuous  ibuprofen  Tablet. 600 milliGRAM(s) Oral every 6 hours  lactated ringers. 1000 milliLiter(s) (125 mL/Hr) IV Continuous <Continuous>  magnesium sulfate Infusion 1 Gm/Hr (25 mL/Hr) IV Continuous <Continuous>  NIFEdipine XL 30 milliGRAM(s) Oral daily  oxytocin Infusion 333.333 milliUNIT(s)/Min (1000 mL/Hr) IV Continuous <Continuous>  sodium chloride 0.9%. 1000 milliLiter(s) (125 mL/Hr) IV Continuous <Continuous>    MEDICATIONS  (PRN):  diphenhydrAMINE 25 milliGRAM(s) Oral every 6 hours PRN Pruritus  HYDROmorphone   Tablet 2 milliGRAM(s) Oral every 6 hours PRN Severe Pain (7 - 10)  HYDROmorphone PCA (1 mG/mL) Rescue Clinician Bolus 0.5 milliGRAM(s) IV Push every 15 minutes PRN for Pain Scale GREATER THAN 6  lanolin Ointment 1 Application(s) Topical every 6 hours PRN Sore Nipples  magnesium hydroxide Suspension 30 milliLiter(s) Oral two times a day PRN Constipation  nalbuphine Injectable 2.5 milliGRAM(s) IV Push every 6 hours PRN Pruritus  naloxone Injectable 0.1 milliGRAM(s) IV Push every 3 minutes PRN For ANY of the following changes in patient status:  A. RR LESS THAN 10 breaths per minute, B. Oxygen saturation LESS THAN 90%, C. Sedation score of 6  ondansetron Injectable 4 milliGRAM(s) IV Push every 6 hours PRN Nausea  oxyCODONE    IR 5 milliGRAM(s) Oral every 3 hours PRN Moderate to Severe Pain (4-10)  oxyCODONE    IR 5 milliGRAM(s) Oral once PRN Moderate to Severe Pain (4-10)  simethicone 80 milliGRAM(s) Chew every 4 hours PRN Gas

## 2023-06-22 NOTE — PROVIDER CONTACT NOTE (OTHER) - DATE AND TIME:
20-Jun-2023 22:35
21-Jun-2023 06:30
22-Jun-2023 23:59
21-Jun-2023 05:30
21-Jun-2023 07:30
21-Jun-2023 11:15
21-Jun-2023 14:04
21-Jun-2023 07:30
21-Jun-2023 08:56
21-Jun-2023 18:44
20-Jun-2023 23:45
21-Jun-2023 02:00
20-Jun-2023 20:00
20-Jun-2023 20:42

## 2023-06-22 NOTE — PROVIDER CONTACT NOTE (OTHER) - ASSESSMENT
Patient's other V/S stable. Fundus firm, VB scant and incision dressing  WNL.
Patient verbalizes feeling better. Fundus firm, VB scant and incision dressing WNL. Lungs clear on auscultation bilaterally.
all other vitals stable, pt slightly jaundice in sclera and experience ringing in the ears
Fundus firm and VB scant. Incision dressing WNL.
Pt in sickle cell crisis and refusing to be stuck. RN educated pt on importance of DVT prophylaxis, but pt still refuses. SCD's on.
Fundus firm and VB scant. Incision dressing WNL.
Unable to get oral or axillary temp on pt. Rectal temp 94.3. Pt states to not feel cold and is warm to touch. Pt covered in blankets and hot packs and room temperature raised.
Pt satting 88-92% on room air, RR 16. Pt asymptomatic otherwise and taught how to use incentive spirometer

## 2023-06-23 ENCOUNTER — TRANSCRIPTION ENCOUNTER (OUTPATIENT)
Age: 38
End: 2023-06-23

## 2023-06-23 LAB
ALBUMIN SERPL ELPH-MCNC: 2.5 G/DL — LOW (ref 3.3–5)
ALP SERPL-CCNC: 184 U/L — HIGH (ref 40–120)
ALT FLD-CCNC: 37 U/L — SIGNIFICANT CHANGE UP (ref 10–45)
ANION GAP SERPL CALC-SCNC: 13 MMOL/L — SIGNIFICANT CHANGE UP (ref 5–17)
APTT BLD: 28.2 SEC — SIGNIFICANT CHANGE UP (ref 27.5–35.5)
AST SERPL-CCNC: 83 U/L — HIGH (ref 10–40)
BASOPHILS # BLD AUTO: 0.05 K/UL — SIGNIFICANT CHANGE UP (ref 0–0.2)
BASOPHILS NFR BLD AUTO: 0.3 % — SIGNIFICANT CHANGE UP (ref 0–2)
BILIRUB SERPL-MCNC: 2.3 MG/DL — HIGH (ref 0.2–1.2)
BUN SERPL-MCNC: 10 MG/DL — SIGNIFICANT CHANGE UP (ref 7–23)
CALCIUM SERPL-MCNC: 8.6 MG/DL — SIGNIFICANT CHANGE UP (ref 8.4–10.5)
CHLORIDE SERPL-SCNC: 104 MMOL/L — SIGNIFICANT CHANGE UP (ref 96–108)
CO2 SERPL-SCNC: 21 MMOL/L — LOW (ref 22–31)
CREAT SERPL-MCNC: 0.69 MG/DL — SIGNIFICANT CHANGE UP (ref 0.5–1.3)
CULTURE RESULTS: NO GROWTH — SIGNIFICANT CHANGE UP
EGFR: 113 ML/MIN/1.73M2 — SIGNIFICANT CHANGE UP
EOSINOPHIL # BLD AUTO: 0.24 K/UL — SIGNIFICANT CHANGE UP (ref 0–0.5)
EOSINOPHIL NFR BLD AUTO: 1.5 % — SIGNIFICANT CHANGE UP (ref 0–6)
FIBRINOGEN PPP-MCNC: 535 MG/DL — HIGH (ref 200–445)
GLUCOSE SERPL-MCNC: 75 MG/DL — SIGNIFICANT CHANGE UP (ref 70–99)
HCT VFR BLD CALC: 23.3 % — LOW (ref 34.5–45)
HGB BLD-MCNC: 8 G/DL — LOW (ref 11.5–15.5)
IMM GRANULOCYTES NFR BLD AUTO: 1.5 % — HIGH (ref 0–0.9)
INR BLD: 0.95 RATIO — SIGNIFICANT CHANGE UP (ref 0.88–1.16)
LDH SERPL L TO P-CCNC: 887 U/L — HIGH (ref 50–242)
LYMPHOCYTES # BLD AUTO: 20.9 % — SIGNIFICANT CHANGE UP (ref 13–44)
LYMPHOCYTES # BLD AUTO: 3.43 K/UL — HIGH (ref 1–3.3)
MCHC RBC-ENTMCNC: 34.3 GM/DL — SIGNIFICANT CHANGE UP (ref 32–36)
MCHC RBC-ENTMCNC: 34.6 PG — HIGH (ref 27–34)
MCV RBC AUTO: 100.9 FL — HIGH (ref 80–100)
MONOCYTES # BLD AUTO: 1.33 K/UL — HIGH (ref 0–0.9)
MONOCYTES NFR BLD AUTO: 8.1 % — SIGNIFICANT CHANGE UP (ref 2–14)
NEUTROPHILS # BLD AUTO: 11.09 K/UL — HIGH (ref 1.8–7.4)
NEUTROPHILS NFR BLD AUTO: 67.7 % — SIGNIFICANT CHANGE UP (ref 43–77)
NRBC # BLD: 44 /100 WBCS — HIGH (ref 0–0)
PLATELET # BLD AUTO: 435 K/UL — HIGH (ref 150–400)
POTASSIUM SERPL-MCNC: 4.1 MMOL/L — SIGNIFICANT CHANGE UP (ref 3.5–5.3)
POTASSIUM SERPL-SCNC: 4.1 MMOL/L — SIGNIFICANT CHANGE UP (ref 3.5–5.3)
PROT SERPL-MCNC: 6.4 G/DL — SIGNIFICANT CHANGE UP (ref 6–8.3)
PROTHROM AB SERPL-ACNC: 11 SEC — SIGNIFICANT CHANGE UP (ref 10.5–13.4)
RBC # BLD: 2.31 M/UL — LOW (ref 3.8–5.2)
RBC # FLD: 24.5 % — HIGH (ref 10.3–14.5)
SODIUM SERPL-SCNC: 138 MMOL/L — SIGNIFICANT CHANGE UP (ref 135–145)
SPECIMEN SOURCE: SIGNIFICANT CHANGE UP
URATE SERPL-MCNC: 7.7 MG/DL — HIGH (ref 2.5–7)
WBC # BLD: 16.39 K/UL — HIGH (ref 3.8–10.5)
WBC # FLD AUTO: 16.39 K/UL — HIGH (ref 3.8–10.5)

## 2023-06-23 RX ADMIN — Medication 600 MILLIGRAM(S): at 05:46

## 2023-06-23 RX ADMIN — Medication 600 MILLIGRAM(S): at 11:43

## 2023-06-23 RX ADMIN — Medication 975 MILLIGRAM(S): at 09:03

## 2023-06-23 RX ADMIN — Medication 975 MILLIGRAM(S): at 21:45

## 2023-06-23 RX ADMIN — HEPARIN SODIUM 5000 UNIT(S): 5000 INJECTION INTRAVENOUS; SUBCUTANEOUS at 09:04

## 2023-06-23 RX ADMIN — HEPARIN SODIUM 5000 UNIT(S): 5000 INJECTION INTRAVENOUS; SUBCUTANEOUS at 21:45

## 2023-06-23 RX ADMIN — Medication 600 MILLIGRAM(S): at 17:39

## 2023-06-23 RX ADMIN — Medication 975 MILLIGRAM(S): at 03:34

## 2023-06-23 RX ADMIN — Medication 975 MILLIGRAM(S): at 15:19

## 2023-06-23 RX ADMIN — Medication 600 MILLIGRAM(S): at 06:14

## 2023-06-23 RX ADMIN — Medication 30 MILLIGRAM(S): at 11:38

## 2023-06-23 RX ADMIN — Medication 975 MILLIGRAM(S): at 04:05

## 2023-06-23 RX ADMIN — Medication 975 MILLIGRAM(S): at 22:15

## 2023-06-23 NOTE — DISCHARGE NOTE OB - NS MD DC FALL RISK RISK
For information on Fall & Injury Prevention, visit: [email or web address] OR  [email or web address] OR  https://www.cdc.gov/steadi/patient.html

## 2023-06-23 NOTE — DISCHARGE NOTE OB - ADDITIONAL INSTRUCTIONS
Instructions:  Make your follow-up appointment with your doctor as ordered.   No heavy lifting, driving, or strenuous activity for 6 weeks.   Nothing per vagina such as tampons, intercourse, douches or tub baths for 6 weeks or until you see your doctor.   Call your doctor with any signs and symptoms of infection such as fever, chills, nausea or vomiting. Call your doctor with redness or swelling at the incision site, fluid leakage or wound separation. Call your doctor if you're unable to tolerate food, increase in vaginal bleeding, or have difficulty urinating. Call your doctor if you have pain that is not releived by your prescribed medications. Notify your doctor with any of concerns.    Take your blood pressure at home twice a day. Call your doctor if your blood pressure is greater than or equal to 160 systolic (top number) or 110 diastolic (bottom number), or you experience a headache unrelieved by OTC medications, blurred vision, or difficulty breathing.    Follow up:  Please f/u in 2 weeks for an incision check and for a postpartum appointment in 4-6 weeks @ the [facility] located at [address] [place] [address], [place], [address]. Please call the office for an appointment ([phone]).

## 2023-06-23 NOTE — DISCHARGE NOTE OB - PATIENT PORTAL LINK FT
You can access the FollowMyHealth Patient Portal offered by [facility] by registering at the following website: [email or web address]. By joining [facility]’s FollowMyHealth portal, you will also be able to view your health information using other applications (apps) compatible with our system.

## 2023-06-23 NOTE — DISCHARGE NOTE OB - CARE PLAN
Assessment and plan of treatment:	s/p emergent  section under general anesthesia for terminal bradycardia in the setting of severe pre eclampsia   1 Principal Discharge DX:	 delivery delivered  Assessment and plan of treatment:	s/p emergent  section under general anesthesia for terminal bradycardia in the setting of severe pre eclampsia. Nothing in the vagina including tampons or sex for the next 6 weeks. Do not soak in water. Take Tylenol and Motrin for pain as needed. No heavy lifting (>10 lbs) for the next 6 weeks.  Secondary Diagnosis:	Severe preeclampsia  Assessment and plan of treatment:	Follow up in clinic within 1 week for a blood pressure check. If you have elevated blood pressures >160/110 or severe headache, blurry vision, increased swelling, or pain in your upper belly on the right side please come to Emergency Department.

## 2023-06-23 NOTE — DISCHARGE NOTE OB - MEDICATION SUMMARY - MEDICATIONS TO STOP TAKING
I will STOP taking the medications listed below when I get home from the hospital:    amoxicillin-clavulanate 500 mg-125 mg oral tablet  -- 1 tab(s) by mouth every 12 hours for pneumonia

## 2023-06-23 NOTE — PROGRESS NOTE ADULT - NS ATTEND AMEND GEN_ALL_CORE FT
feeling better, no chest pain. pain in abd.
As above.    37 y/o female with sickle cell anemia, admitted with preeclampsia, now s/p delivery. Patient's pain is improved today. Continue pain control with IV opiates. Hemoglobin continuing to drop; monitor closely.

## 2023-06-23 NOTE — PROGRESS NOTE ADULT - SUBJECTIVE AND OBJECTIVE BOX
Patient seen and examined at bedside, no acute overnight events. Somnolent but with no acute complaints, pain well controlled, no complaints of pain. Patient is ambulating in her room and tolerating regular diet, passing flatus, voiding spontaneously. Denies CP, SOB, N/V, HA, blurred vision, epigastric pain.    Vital Signs Last 24 Hours  T(C): 36.8 (06-23-23 @ 00:36), Max: 37.1 (06-22-23 @ 20:36)  HR: 89 (06-23-23 @ 00:36) (80 - 89)  BP: 148/76 (06-23-23 @ 00:36) (124/69 - 149/83)  RR: 18 (06-23-23 @ 00:36) (16 - 18)  SpO2: 97% (06-23-23 @ 00:36) (91% - 98%)    I&O's Summary    21 Jun 2023 07:01  -  22 Jun 2023 07:00  --------------------------------------------------------  IN: 1646 mL / OUT: 5250 mL / NET: -3604 mL      Physical Exam:  General: NAD  Abdomen: Soft, non-tender, non-distended, fundus firm  Incision: Pfannenstiel incision CDI, subcuticular suture closure  Pelvic: Lochia wnl, external exam of perineum clean and dry without swelling    Labs:  Blood Type: O Positive  Antibody Screen: Negative               7.4    16.48 )-----------( 391      ( 06-22 @ 06:57 )             21.8                8.7    18.49 )-----------( 362      ( 06-21 @ 07:33 )             25.0                9.1    12.47 )-----------( 396      ( 06-20 @ 16:49 )             26.1     MEDICATIONS  (STANDING):  acetaminophen     Tablet .. 975 milliGRAM(s) Oral <User Schedule>  diphtheria/tetanus/pertussis (acellular) Vaccine (Adacel) 0.5 milliLiter(s) IntraMuscular once  heparin   Injectable 5000 Unit(s) SubCutaneous two times a day  ibuprofen  Tablet. 600 milliGRAM(s) Oral every 6 hours  lactated ringers. 1000 milliLiter(s) (125 mL/Hr) IV Continuous <Continuous>  magnesium sulfate Infusion 1 Gm/Hr (25 mL/Hr) IV Continuous <Continuous>  NIFEdipine XL 30 milliGRAM(s) Oral daily  oxytocin Infusion 333.333 milliUNIT(s)/Min (1000 mL/Hr) IV Continuous <Continuous>  sodium chloride 0.9%. 1000 milliLiter(s) (125 mL/Hr) IV Continuous <Continuous>    MEDICATIONS  (PRN):  diphenhydrAMINE 25 milliGRAM(s) Oral every 6 hours PRN Pruritus  HYDROmorphone   Tablet 2 milliGRAM(s) Oral every 6 hours PRN Severe Pain (7 - 10)  lanolin Ointment 1 Application(s) Topical every 6 hours PRN Sore Nipples  magnesium hydroxide Suspension 30 milliLiter(s) Oral two times a day PRN Constipation  oxyCODONE    IR 5 milliGRAM(s) Oral once PRN Moderate to Severe Pain (4-10)  oxyCODONE    IR 5 milliGRAM(s) Oral every 3 hours PRN Moderate to Severe Pain (4-10)  simethicone 80 milliGRAM(s) Chew every 4 hours PRN Gas

## 2023-06-23 NOTE — DISCHARGE NOTE OB - PLAN OF CARE
s/p emergent  section under general anesthesia for terminal bradycardia in the setting of severe pre eclampsia Follow up in clinic within 1 week for a blood pressure check. If you have elevated blood pressures >160/110 or severe headache, blurry vision, increased swelling, or pain in your upper belly on the right side please come to Emergency Department. s/p emergent  section under general anesthesia for terminal bradycardia in the setting of severe pre eclampsia. Nothing in the vagina including tampons or sex for the next 6 weeks. Do not soak in water. Take Tylenol and Motrin for pain as needed. No heavy lifting (>10 lbs) for the next 6 weeks.

## 2023-06-23 NOTE — PROGRESS NOTE ADULT - SUBJECTIVE AND OBJECTIVE BOX
Patient is a 38y old  Female who presents with a chief complaint of  section, sickle cell (2023 10:03)    Pt seen and examined at bedside. Feeling better, pain improving. Some pain at incision site.    MEDICATIONS  (STANDING):  acetaminophen     Tablet .. 975 milliGRAM(s) Oral <User Schedule>  diphtheria/tetanus/pertussis (acellular) Vaccine (Adacel) 0.5 milliLiter(s) IntraMuscular once  heparin   Injectable 5000 Unit(s) SubCutaneous two times a day  ibuprofen  Tablet. 600 milliGRAM(s) Oral every 6 hours  lactated ringers. 1000 milliLiter(s) (125 mL/Hr) IV Continuous <Continuous>  magnesium sulfate Infusion 1 Gm/Hr (25 mL/Hr) IV Continuous <Continuous>  NIFEdipine XL 30 milliGRAM(s) Oral daily  oxytocin Infusion 333.333 milliUNIT(s)/Min (1000 mL/Hr) IV Continuous <Continuous>  sodium chloride 0.9%. 1000 milliLiter(s) (125 mL/Hr) IV Continuous <Continuous>    MEDICATIONS  (PRN):  diphenhydrAMINE 25 milliGRAM(s) Oral every 6 hours PRN Pruritus  HYDROmorphone   Tablet 2 milliGRAM(s) Oral every 6 hours PRN Severe Pain (7 - 10)  lanolin Ointment 1 Application(s) Topical every 6 hours PRN Sore Nipples  magnesium hydroxide Suspension 30 milliLiter(s) Oral two times a day PRN Constipation  oxyCODONE    IR 5 milliGRAM(s) Oral every 3 hours PRN Moderate to Severe Pain (4-10)  oxyCODONE    IR 5 milliGRAM(s) Oral once PRN Moderate to Severe Pain (4-10)  simethicone 80 milliGRAM(s) Chew every 4 hours PRN Gas      Vital Signs Last 24 Hrs  T(C): 36.9 (2023 09:00), Max: 37.2 (2023 05:40)  T(F): 98.4 (2023 09:00), Max: 99 (2023 05:40)  HR: 91 (2023 09:00) (80 - 91)  BP: 158/91 (2023 09:00) (124/69 - 158/91)  BP(mean): --  RR: 18 (2023 09:00) (18 - 18)  SpO2: 92% (2023 09:00) (92% - 98%)    Parameters below as of 2023 09:00  Patient On (Oxygen Delivery Method): room air        PE  NAD  Awake, alert  Anicteric, MMM  No c/c/e  No rash grossly  FROM                          8.0    16.39 )-----------( 435      ( 2023 06:58 )             23.3           138  |  104  |  10  ----------------------------<  75  4.1   |  21<L>  |  0.69    Ca    8.6      2023 06:58  Mg     7.3         TPro  6.4  /  Alb  2.5<L>  /  TBili  2.3<H>  /  DBili  x   /  AST  83<H>  /  ALT  37  /  AlkPhos  184<H>

## 2023-06-23 NOTE — DISCHARGE NOTE OB - CARE PROVIDER_API CALL
[facility],   [address] [place], [place]  Phone: [phone]  Fax: (   )    -  Follow Up Time:

## 2023-06-23 NOTE — DISCHARGE NOTE OB - MEDICATION SUMMARY - MEDICATIONS TO TAKE
I will START or STAY ON the medications listed below when I get home from the hospital:    ibuprofen 600 mg oral tablet  -- 1 tab(s) by mouth every 6 hours  -- Indication: For pain    acetaminophen 325 mg oral tablet  -- 3 tab(s) by mouth every 6 hours  -- Indication: For pain    Prenatal Multivitamins with Folic Acid 1 mg oral tablet  -- 1 tab(s) by mouth once a day  -- Indication: For MVI   I will START or STAY ON the medications listed below when I get home from the hospital:    ibuprofen 600 mg oral tablet  -- 1 tab(s) by mouth every 6 hours  -- Indication: For pain    acetaminophen 325 mg oral tablet  -- 3 tab(s) by mouth every 6 hours  -- Indication: For pain    Procardia XL 60 mg oral tablet, extended release  -- 1 tab(s) by mouth once a day Take BP first. If BP is 110/50 or less, do NOT take medication  -- Indication: For Hypertension    Prenatal Multivitamins with Folic Acid 1 mg oral tablet  -- 1 tab(s) by mouth once a day  -- Indication: For MVI

## 2023-06-23 NOTE — DISCHARGE NOTE OB - HOSPITAL COURSE
39 yo presented with elevated blood pressures, diagnosed with severe pre eclampsia. Patient recieved BP medication and was started on magnesium. Terminal bradycardia prompted emergent  section under general anesthesia at 31 weeks. The patient's post operative course was complicated by desaturation to the 80s, requiring 4L NC. CTA was obtained which was negative. PCA was obtained for the patient's sickle cell crisis. On POD2 the patient's oxygen saturation had improved and she was weaned off of oxygen. Her PCA was discontinued. She was passing gas, tolerating PO, ambulating, voiding, and meeting post operative milestones.  39 yo presented with elevated blood pressures, diagnosed with severe pre eclampsia. Patient received immediate-acting antihypertensive medication and was started on magnesium. Terminal fetal bradycardia was noted following antihypertensive that did not resolve following resuscitation and prompted emergent  section under general anesthesia at 31 weeks. The patient's post operative course was complicated by desaturation to the 80s, requiring 4L nasal canula. CTA was obtained which was negative. PCA was obtained for the patient's sickle cell crisis and post-operative pain control. On POD2 the patient's oxygen saturation had improved and she was weaned off of oxygen. Her PCA was discontinued. She was passing gas, tolerating PO, ambulating, voiding, and meeting post operative milestones. Patient wished to stay until POD4 as her infant was in NICU and then was discharged on POD5 after antihypertensive medication titration; blood pressures were normal to mild range on nifedipine 60mg prior to discharge. Patient desired Mirena IUD for contraception. She was discharged to home with close interval blood pressure check at [facility].

## 2023-06-24 RX ADMIN — Medication 975 MILLIGRAM(S): at 21:11

## 2023-06-24 RX ADMIN — Medication 600 MILLIGRAM(S): at 12:30

## 2023-06-24 RX ADMIN — Medication 600 MILLIGRAM(S): at 23:33

## 2023-06-24 RX ADMIN — Medication 600 MILLIGRAM(S): at 00:02

## 2023-06-24 RX ADMIN — Medication 975 MILLIGRAM(S): at 08:30

## 2023-06-24 RX ADMIN — Medication 600 MILLIGRAM(S): at 06:14

## 2023-06-24 RX ADMIN — Medication 975 MILLIGRAM(S): at 16:11

## 2023-06-24 RX ADMIN — Medication 975 MILLIGRAM(S): at 08:51

## 2023-06-24 RX ADMIN — Medication 30 MILLIGRAM(S): at 11:04

## 2023-06-24 RX ADMIN — Medication 600 MILLIGRAM(S): at 00:05

## 2023-06-24 RX ADMIN — Medication 975 MILLIGRAM(S): at 21:45

## 2023-06-24 RX ADMIN — Medication 600 MILLIGRAM(S): at 11:04

## 2023-06-24 RX ADMIN — Medication 600 MILLIGRAM(S): at 17:20

## 2023-06-24 RX ADMIN — Medication 600 MILLIGRAM(S): at 05:45

## 2023-06-24 NOTE — PROGRESS NOTE ADULT - SUBJECTIVE AND OBJECTIVE BOX
Patient seen and examined at bedside, no acute overnight events. No acute complaints, pain well controlled. Patient is ambulating and tolerating regular diet. Passing flatus, voiding spontaneously. States she ambulated to bathroom in her room and also to the NICU to see the baby. Denies CP, SOB, N/V, HA, blurred vision, epigastric pain.    Vital Signs Last 24 Hours  T(C): 36.8 (06-24-23 @ 00:05), Max: 37.2 (06-23-23 @ 05:40)  HR: 72 (06-24-23 @ 00:05) (72 - 97)  BP: 142/88 (06-24-23 @ 00:05) (126/80 - 158/91)  RR: 18 (06-24-23 @ 00:05) (18 - 18)  SpO2: 95% (06-24-23 @ 00:05) (92% - 98%)    Physical Exam:  General: NAD  Abdomen: Soft, non-tender, non-distended, fundus firm  Incision: Pfannenstiel incision CDI, subcuticular suture closure  Pelvic: Lochia wnl, external exam of perineum clean and dry without swelling    Labs:  Blood Type: O Positive  Antibody Screen: Negative               8.0    16.39 )-----------( 435      ( 06-23 @ 06:58 )             23.3                7.4    16.48 )-----------( 391      ( 06-22 @ 06:57 )             21.8                8.7    18.49 )-----------( 362      ( 06-21 @ 07:33 )             25.0     MEDICATIONS  (STANDING):  acetaminophen     Tablet .. 975 milliGRAM(s) Oral <User Schedule>  diphtheria/tetanus/pertussis (acellular) Vaccine (Adacel) 0.5 milliLiter(s) IntraMuscular once  heparin   Injectable 5000 Unit(s) SubCutaneous two times a day  ibuprofen  Tablet. 600 milliGRAM(s) Oral every 6 hours  lactated ringers. 1000 milliLiter(s) (125 mL/Hr) IV Continuous <Continuous>  magnesium sulfate Infusion 1 Gm/Hr (25 mL/Hr) IV Continuous <Continuous>  NIFEdipine XL 30 milliGRAM(s) Oral daily  oxytocin Infusion 333.333 milliUNIT(s)/Min (1000 mL/Hr) IV Continuous <Continuous>  sodium chloride 0.9%. 1000 milliLiter(s) (125 mL/Hr) IV Continuous <Continuous>    MEDICATIONS  (PRN):  diphenhydrAMINE 25 milliGRAM(s) Oral every 6 hours PRN Pruritus  HYDROmorphone   Tablet 2 milliGRAM(s) Oral every 6 hours PRN Severe Pain (7 - 10)  lanolin Ointment 1 Application(s) Topical every 6 hours PRN Sore Nipples  magnesium hydroxide Suspension 30 milliLiter(s) Oral two times a day PRN Constipation  oxyCODONE    IR 5 milliGRAM(s) Oral every 3 hours PRN Moderate to Severe Pain (4-10)  oxyCODONE    IR 5 milliGRAM(s) Oral once PRN Moderate to Severe Pain (4-10)  simethicone 80 milliGRAM(s) Chew every 4 hours PRN Gas

## 2023-06-25 VITALS
DIASTOLIC BLOOD PRESSURE: 87 MMHG | OXYGEN SATURATION: 95 % | SYSTOLIC BLOOD PRESSURE: 128 MMHG | HEART RATE: 92 BPM | RESPIRATION RATE: 18 BRPM

## 2023-06-25 PROCEDURE — 80053 COMPREHEN METABOLIC PANEL: CPT

## 2023-06-25 PROCEDURE — C1765: CPT

## 2023-06-25 PROCEDURE — 82570 ASSAY OF URINE CREATININE: CPT

## 2023-06-25 PROCEDURE — 84156 ASSAY OF PROTEIN URINE: CPT

## 2023-06-25 PROCEDURE — 87086 URINE CULTURE/COLONY COUNT: CPT

## 2023-06-25 PROCEDURE — 87040 BLOOD CULTURE FOR BACTERIA: CPT

## 2023-06-25 PROCEDURE — 84550 ASSAY OF BLOOD/URIC ACID: CPT

## 2023-06-25 PROCEDURE — 85025 COMPLETE CBC W/AUTO DIFF WBC: CPT

## 2023-06-25 PROCEDURE — 86900 BLOOD TYPING SEROLOGIC ABO: CPT

## 2023-06-25 PROCEDURE — 88307 TISSUE EXAM BY PATHOLOGIST: CPT

## 2023-06-25 PROCEDURE — 59050 FETAL MONITOR W/REPORT: CPT

## 2023-06-25 PROCEDURE — 85610 PROTHROMBIN TIME: CPT

## 2023-06-25 PROCEDURE — 81001 URINALYSIS AUTO W/SCOPE: CPT

## 2023-06-25 PROCEDURE — 85730 THROMBOPLASTIN TIME PARTIAL: CPT

## 2023-06-25 PROCEDURE — 36415 COLL VENOUS BLD VENIPUNCTURE: CPT

## 2023-06-25 PROCEDURE — 83735 ASSAY OF MAGNESIUM: CPT

## 2023-06-25 PROCEDURE — 59025 FETAL NON-STRESS TEST: CPT

## 2023-06-25 PROCEDURE — 86850 RBC ANTIBODY SCREEN: CPT

## 2023-06-25 PROCEDURE — 85384 FIBRINOGEN ACTIVITY: CPT

## 2023-06-25 PROCEDURE — 86901 BLOOD TYPING SEROLOGIC RH(D): CPT

## 2023-06-25 PROCEDURE — 71275 CT ANGIOGRAPHY CHEST: CPT

## 2023-06-25 PROCEDURE — 83615 LACTATE (LD) (LDH) ENZYME: CPT

## 2023-06-25 RX ORDER — NIFEDIPINE 30 MG
60 TABLET, EXTENDED RELEASE 24 HR ORAL DAILY
Refills: 0 | Status: DISCONTINUED | OUTPATIENT
Start: 2023-06-25 | End: 2023-06-25

## 2023-06-25 RX ORDER — NIFEDIPINE 30 MG
1 TABLET, EXTENDED RELEASE 24 HR ORAL
Qty: 60 | Refills: 0
Start: 2023-06-25

## 2023-06-25 RX ADMIN — Medication 600 MILLIGRAM(S): at 00:10

## 2023-06-25 RX ADMIN — Medication 60 MILLIGRAM(S): at 12:20

## 2023-06-25 RX ADMIN — Medication 975 MILLIGRAM(S): at 16:31

## 2023-06-25 RX ADMIN — Medication 975 MILLIGRAM(S): at 10:37

## 2023-06-25 RX ADMIN — Medication 975 MILLIGRAM(S): at 03:45

## 2023-06-25 RX ADMIN — Medication 600 MILLIGRAM(S): at 05:32

## 2023-06-25 RX ADMIN — Medication 975 MILLIGRAM(S): at 03:09

## 2023-06-25 RX ADMIN — Medication 975 MILLIGRAM(S): at 09:46

## 2023-06-25 RX ADMIN — Medication 600 MILLIGRAM(S): at 06:10

## 2023-06-25 RX ADMIN — Medication 975 MILLIGRAM(S): at 21:52

## 2023-06-25 RX ADMIN — Medication 600 MILLIGRAM(S): at 12:59

## 2023-06-25 RX ADMIN — Medication 975 MILLIGRAM(S): at 22:23

## 2023-06-25 RX ADMIN — Medication 975 MILLIGRAM(S): at 17:11

## 2023-06-25 RX ADMIN — Medication 600 MILLIGRAM(S): at 13:38

## 2023-06-25 NOTE — PROGRESS NOTE ADULT - ASSESSMENT
37y/o  POD#4 s/p emergent pLTCS () for fetal bradycardia. Pregnancy course c/b preeclampsia with severe features, PMH significant for sickle cell disease with most recent crisis . Patient currently in clinically stable condition.    #sPEC/Mg  - BP's overnight 120s-140s/80s  - AST/ALT: (222/150 outpt) 162/89->122/69->>97/45; w/ ICP during pregnancy, continue to trend  - s/p Mg for seizure prophylaxis  - Continue w/ Procardia 30 QD  - Continue to monitor BP's    #Desaturation  - Patient currently comfortable on 4LNC saturating 87-94% throughout course of day  - Goal for O2 sat above 88%  - CTA Chest (): no PE    #Sickle Cell Disease  - Last crisis   - f/u AM H/H  - Heme: H/H stable, transfuse for Hgb <7, c/w folic acid, no sign of acute chest syndrome    #Routine Postpartum Care  - Continue with PO pain medications, s/p PCA  - Increase ambulation; encouraged to ambulate more  - Continue regular diet    Lolis Pa  PGY-2
39y/o  POD#2 s/p emergent pLTCS () for fetal bradycardia. Pregnancy course c/b preeclampsia with severe features, PMH significant for sickle cell disease with most recent crisis .     #sPEC/Mg  - BP's overnight 130s-150s/80s  - AST/ALT: (222/150 outpt) 162/89->122/69; w/ ICP during pregnancy, continue to trend  - s/p Mg for seizure prophylaxis  - Continue w/ Procardia 30 QD  - Continue to monitor BP's    #Desaturation  - Patient currently comfortable on 4LNC saturating 87-94% throughout course of day  - Goal for O2 sat above 88%  - CTA Chest (): no PE    #Sickle Cell Disease  - Last crisis   - C/w PCA, eval for d/c later in day  - f/u AM H/H  - Heme: H/H stable, transfuse for Hgb <7, c/w folic acid, no sign of acute chest syndrome    #Routine Postpartum Care  - Continue with pain management per anesthesia  - Increase ambulation  - Continue regular diet    Lolis Pa  PGY-2
A/P: 37yo  with a history of sickle cell anemia now POD1 s/p pCS under GETA given fetal bradycardia s/p treatment with antihypertensives for severe PEC. As of this morning patient remains on PCA for pain control. Vitals significant for episode of hypothermia and desaturation to 90-92% this morning. Unclear etiology at this time. Given history of sickle cell patient at increased risk for clots, infection and sickle crisis. Recommend CT PE to better assess for acute chest syndrome, PE or pneumonia given hypothermia and desaturation. Pending results will pursue additional infectious work up.   - Continue pain management with PCA   - Plan for CT to rule out PE, pneumonia and acute chest syndrome   - Encourage ambulation for post op healing   - Continue magnesium for diagnosis of severe PEC   - Otherwise routine post op care     Carly Hirschberg, MFM Fellow   Patient seen and evaluated with MITCH Boone Attending 
A/P: 37yo  with a history of sickle cell anemia, early diagnosis of ICP and history of sPEC in 2 prior pregnancies, now 30w3d presenting for and elevated blood pressures in clinic now meeting criteria for severe PEC due to severe range BPs and headache. Fetal status reassuring on presentation with NST reactive and BPP today . Concern for maternal continued hypertension. Patient counseled on possible need for delivery if concern for HELLP or persistent headache despite pain control. We discussed importance of magnesium for seizure ppx and betamethasone for fetal lung maturation. Additionally, we discussed use of procardia XL for long action and immediate release if persistent severe range.   Recommendations:   - Procardia 30XL for BP control   - Magnesium bolus   - Plan for another dose of procardia 10IR if continued severe range BP   - Hydromorphone for headache, will hold tylenol given LFTs   - Awaiting HELLP labs     Carly Hirschberg, MFM Fellow   Patient seen and examined with MITCH Boone Attending     Addendum:   While at bedside repeat BP 20 minutes after initial dose of procardia 10 was severe range, decision made for second dose of procardia 10IR.   
HPI: 37yo  with PMHx of sickle cell anemia (w/ last hospitalization for pain crisis on ) at 30w3d presents for and elevated blood pressures in clinic    SCD w/ crisis  --follows with Dr. Riojas of [facility]  --c/w folic acid  --on PCA, IVF - pain is improving  --No signs of acute chest syndrome; lungs clear on CTA chest done    --Bili, LDH improving  --h/h stable, transfuse for  Hgb <7  --ongoing care after discharge    SOB  - cta negative for PE   - no consolidation     will follow,    Balbir Dorman PA-C  Hematology/Oncology  [facility]  [phone] (office)
37y/o  s/p emergent pLTCS () for fetal bradycardia. Pregnancy course c/b preeclampsia with severe features, PMH significant for sickle cell disease with most recent crisis .     #sPEC/Mg  - BP's well-controlled overnight, 110s-130s/60s-80s  - AST/ALT: (222/150 outpt) 162/89; w/ ICP during pregnancy, continue to trend  - c/w Mg for seizure prophylaxis, d/c @530p  - Continue w/ Procardia 30 QD  - Continue to monitor BP's    #Sickle Cell Disease  - Last crisis   - C/w PCA, eval for d/c later in day  - f/u AM H/H  - Heme: outpt recommendation to keep H/H under 10 to avoid vasooclussive complication    #Routine Postpartum Care  - Continue with pain management per anesthesia  - Increase ambulation  - Continue regular diet  - Check POD#1 AM CBC    Lolis Pa  PGY-2
38 year old female with PMHx of sickle cell anemia (w/ last hospitalization for pain crisis on 6/19) at 30w3d presents for and elevated blood pressures in clinic, now s/p delivery.    SCD w/ crisis  --follows with Dr. Riojas of [facility]  --c/w folic acid  --on PCA, IVF - pain is improving  --No signs of acute chest syndrome; lungs clear on CTA chest done 6/21   --Bili, LDH improving  --Monitor CBC  --Transfuse for  Hgb <7  --ongoing care after discharge    SOB  - cta negative for PE   - no consolidation     will follow,    Balbir Dorman PA-C  Hematology/Oncology  [facility]  [phone] (office)
39y/o  POD#5 s/p emergent pLTCS () for fetal bradycardia. Pregnancy course c/b preeclampsia with severe features, PMH significant for sickle cell disease with most recent crisis . Patient currently in clinically stable condition.    #sPEC/Mg  - AST/ALT: (222/150 outpt) 162/89->122/69->>97/45; w/ ICP during pregnancy, continue to trend  - s/p Mg for seizure prophylaxis  - Continue w/ Procardia 30 QD  - Continue to monitor BP's    #Desaturation  - Saturating well on RA  - Goal for O2 sat above 88%  - CTA Chest (): no PE    #Sickle Cell Disease  - Last crisis   - Heme: H/H stable, transfuse for Hgb <7, c/w folic acid, no sign of acute chest syndrome    #Routine Postpartum Care  - Continue with PO pain medications, s/p PCA  - Increase ambulation; encouraged to ambulate more  - Continue regular diet    Hortensia Edouard, PGY-3
39y/o  POD#3 s/p emergent pLTCS () for fetal bradycardia. Pregnancy course c/b preeclampsia with severe features, PMH significant for sickle cell disease with most recent crisis . Patient currently in clinically stable condition.    #sPEC/Mg  - BP's overnight 120s-140s/60s-80s  - AST/ALT: (222/150 outpt) 162/89->122/69->>97/45; w/ ICP during pregnancy, continue to trend  - s/p Mg for seizure prophylaxis  - Continue w/ Procardia 30 QD  - Continue to monitor BP's    #Desaturation  - Patient currently comfortable on 4LNC saturating 87-94% throughout course of day  - Goal for O2 sat above 88%  - CTA Chest (): no PE    #Sickle Cell Disease  - Last crisis   - f/u AM H/H  - Heme: H/H stable, transfuse for Hgb <7, c/w folic acid, no sign of acute chest syndrome    #Routine Postpartum Care  - Continue with PO pain medications, s/p PCA  - Increase ambulation; encouraged to ambulate more  - Continue regular diet    Lolis Pa  PGY-2

## 2023-06-25 NOTE — PROGRESS NOTE ADULT - SUBJECTIVE AND OBJECTIVE BOX
Patient seen and examined at bedside, no acute overnight events. No acute concerns, pain well controlled. Patient is ambulating and tolerating regular diet. Has not yet passed flatus. Voiding spontaneously. Denies CP, SOB, N/V, HA, blurred vision, epigastric pain.    Vital Signs Last 24 Hours  T(C): 37 (06-25-23 @ 01:36), Max: 37 (06-25-23 @ 01:36)  HR: 76 (06-25-23 @ 01:36) (76 - 90)  BP: 152/92 (06-25-23 @ 01:36) (129/87 - 152/92)  RR: 18 (06-25-23 @ 01:36) (18 - 18)  SpO2: 94% (06-25-23 @ 01:36) (94% - 96%)    I&O's Summary      Physical Exam:  General: NAD  Abdomen: Soft, non-tender, non-distended, fundus firm  Incision: Pfannensteil incision CDI, subcuticular suture closure  Pelvic: Lochia wnl    Labs:    Blood Type: O Positive  Antibody Screen: Negative               8.0    16.39 )-----------( 435      ( 06-23 @ 06:58 )             23.3                7.4    16.48 )-----------( 391      ( 06-22 @ 06:57 )             21.8                8.7    18.49 )-----------( 362      ( 06-21 @ 07:33 )             25.0         MEDICATIONS  (STANDING):  acetaminophen     Tablet .. 975 milliGRAM(s) Oral <User Schedule>  diphtheria/tetanus/pertussis (acellular) Vaccine (Adacel) 0.5 milliLiter(s) IntraMuscular once  heparin   Injectable 5000 Unit(s) SubCutaneous two times a day  ibuprofen  Tablet. 600 milliGRAM(s) Oral every 6 hours  lactated ringers. 1000 milliLiter(s) (125 mL/Hr) IV Continuous <Continuous>  magnesium sulfate Infusion 1 Gm/Hr (25 mL/Hr) IV Continuous <Continuous>  NIFEdipine XL 30 milliGRAM(s) Oral daily  oxytocin Infusion 333.333 milliUNIT(s)/Min (1000 mL/Hr) IV Continuous <Continuous>  sodium chloride 0.9%. 1000 milliLiter(s) (125 mL/Hr) IV Continuous <Continuous>    MEDICATIONS  (PRN):  diphenhydrAMINE 25 milliGRAM(s) Oral every 6 hours PRN Pruritus  HYDROmorphone   Tablet 2 milliGRAM(s) Oral every 6 hours PRN Severe Pain (7 - 10)  lanolin Ointment 1 Application(s) Topical every 6 hours PRN Sore Nipples  magnesium hydroxide Suspension 30 milliLiter(s) Oral two times a day PRN Constipation  oxyCODONE    IR 5 milliGRAM(s) Oral once PRN Moderate to Severe Pain (4-10)  oxyCODONE    IR 5 milliGRAM(s) Oral every 3 hours PRN Moderate to Severe Pain (4-10)  simethicone 80 milliGRAM(s) Chew every 4 hours PRN Gas

## 2023-06-25 NOTE — PROGRESS NOTE ADULT - ATTENDING COMMENTS
Obstetrical  8am-6pm:  Patient seen and examined by me. Agree with above resident note. Incision clean, dry and intact.  Sundar CRUZ
Obstetrical  8am-6pm:  Patient seen and examined by me. Agree with above resident note. Incision clean, dry and intact. Stressed eating meals, walking and chewing gum to promote flatus. Abdomen soft and minimally distended at this point.  Sundar CRUZ
37y/o  POD5 s/p pLTCS under general anesthesia at 30 weeks for fetal bradycardia in setting of severe preeclampsia. Patient is asymptomatic, blood pressures high mild range (150s/8-90s) on nifedipine 30mg, will increase to 60mg. Labs are clinically improved with H/H at her baseline (.3), platelets 435, improved LDH (887, patient has elevated baseline due to Sickle cell anemia), improved LFTs (AST/ALT 83/37) with normal Cr 0.69. Patient had CT PE that was negative for desaturations noted early in post-op course, she denies any respiratory symptoms currently. From a postpartum standpoint, patient is doing well with well-healing incision, is pumping for infant in NICU, and is passing appropriate post-op milestones with well-controlled pain. Patient desired tubal ligation for contraception however was not consented prior to CS, now desires Mirena IUD in clinic, declines bridge. Patient counseled on recommended inter-pregnancy interval of 18 months and was counseled she can TOLAC if desired. Maternal issues include Sickle cell disease with baseline transaminitis, planned for outpatient MRCP with GI. Will monitor blood pressures today following increase in nifedipine, with possible discharge later this evening and close interval follow up at high risk clinic for BP check. Cardio-OB consultation is recommended.     Jacinta Dyer, OB 
Obstetrical  8am-6pm:  Patient seen and examined by me. Agree with above resident note. Incision clean, dry and intact. due to O2 saturation in the low to mid 90s, CXR was ordered. iscussed eating, walking and chewing gum to promote flatus passage.  Sundar CRUZ

## 2023-06-26 LAB
CULTURE RESULTS: SIGNIFICANT CHANGE UP
CULTURE RESULTS: SIGNIFICANT CHANGE UP
SPECIMEN SOURCE: SIGNIFICANT CHANGE UP
SPECIMEN SOURCE: SIGNIFICANT CHANGE UP

## 2023-06-26 RX ORDER — BLOOD PRESSURE TEST KIT-LARGE
KIT MISCELLANEOUS
Qty: 1 | Refills: 0 | Status: ACTIVE | COMMUNITY
Start: 2023-06-26 | End: 1900-01-01

## 2023-06-26 RX ORDER — IBUPROFEN 600 MG/1
600 TABLET, FILM COATED ORAL
Qty: 30 | Refills: 0 | Status: ACTIVE | COMMUNITY
Start: 2023-06-26 | End: 1900-01-01

## 2023-06-26 RX ORDER — ACETAMINOPHEN 325 MG/1
325 TABLET, FILM COATED ORAL
Qty: 60 | Refills: 0 | Status: ACTIVE | COMMUNITY
Start: 2023-06-26 | End: 1900-01-01

## 2023-06-26 NOTE — ASSESSMENT
[FreeTextEntry1] : Ms. Cevallos is a 38 year old female that is 29 weeks pregnancy with a complicated medical history and presents for a cardiovascular evaluation.\par \par Patient carries a history of cholestasis of pregnancy, elevated LFTs, transaminitis and sickle cell disease. \par Her most recent crisis was in 2023. She received (1) unit of PRBC, IV hydration and pain medications at [facility]. \par \par Of note, patient reports some "occasional" elevations in blood pressure. Most recently documented BP on 2023 was :   120/80.\par \par \par #Elevated blood pressure readings without diagnosis of hypertension\par   Review of recent BP measurements are within range\par   Recommended to monitor BP at home once daily and record. If systolic pressures rise above 140, to \par   notify cardio OB for treatment.\par \par \par #Adverse Cardiovascular Risk Factors\par \par  Personal history of preeclampsia,  labor,  cholestasis of pregnancy, elevated LFTs, transaminitis and sickle cell disease. \par Her most recent crisis was in 2023. She received (1) unit of PRBC, IV hydration and pain medications at [facility]. \par \par Family history of hypertension, type 2 diabetes\par \par Recommending a baseline cardiovascular evaluation \par In-office physical examination and 12 lead EKG\par Echocardiogram to assess cardiac structure and function\par \par - Encouraged patient to participate in healthy activity (walking) and  eating habits, focusing on a Mediterranean style of eating.\par \par - Encouraged the patient to find healthy outlets and coping mechanisms to help manage stress, such as reducing workload if possible, spending time with family and friends, engaging in an enjoyable hobby, or using meditation or mindfulness techniques.\par \par \par \par \par \par   \par

## 2023-06-26 NOTE — HISTORY OF PRESENT ILLNESS
[FreeTextEntry1] : Ms. Cevallos is a 38 year old female that is 29 weeks pregnancy with a complicated medical history and presents for a cardiovascular evaluation.\par \par Patient carries a history of cholestasis of pregnancy, elevated LFTs, transaminitis and sickle cell disease. \par Her most recent crisis was in 2023. She received (1) unit of PRBC, IV hydration and pain medications at [facility]. \par \par Ms. Becerra follow up with hematologist Dr. Skyler Riojas in [facility] and is now being treated by Dr. Sharee Costa at [facility]. \par \par Patient has had two prior pregnancy\par \par 1st pregnancy-   , 36 weeks gestation-> induced, -> secondary to cholestasis\par  \par 2nd pregnancy-  , induced at 33 weeks gestation secondary to preeclampsia\par \par 3rd pregnancy- , \par \par DUE DATE:   2023\par LMP:            November 15, 2022\par \par Of not, she has chronically elevated liver tests prior to and after each pregnancy.\par She denies any other acute complaints at this time.  patient has ongoing itching and is using Benadryl. \par \par Review of Hepatology notes: Given the chronicity of her elevated LFTS, suggesting serologic testing for other forms of liver disease and better imaging, biopsy post partum. \par \par Patient reports some "occasional" elevations in blood pressure. Most recently documented BP on 2023 was :   120/80.\par \par \par \par \par

## 2023-06-27 ENCOUNTER — OUTPATIENT (OUTPATIENT)
Dept: OUTPATIENT SERVICES | Facility: HOSPITAL | Age: 38
LOS: 1 days | Discharge: HOME | End: 2023-06-27

## 2023-06-27 DIAGNOSIS — Z87.59 PERSONAL HISTORY OF OTHER COMPLICATIONS OF PREGNANCY, CHILDBIRTH AND THE PUERPERIUM: Chronic | ICD-10-CM

## 2023-06-27 DIAGNOSIS — D57.1 SICKLE-CELL DISEASE WITHOUT CRISIS: ICD-10-CM

## 2023-06-27 DIAGNOSIS — Z90.49 ACQUIRED ABSENCE OF OTHER SPECIFIED PARTS OF DIGESTIVE TRACT: Chronic | ICD-10-CM

## 2023-06-28 ENCOUNTER — APPOINTMENT (OUTPATIENT)
Dept: CARDIOLOGY | Facility: CLINIC | Age: 38
End: 2023-06-28

## 2023-07-05 ENCOUNTER — NON-APPOINTMENT (OUTPATIENT)
Age: 38
End: 2023-07-05

## 2023-07-07 ENCOUNTER — APPOINTMENT (OUTPATIENT)
Dept: HEMATOLOGY ONCOLOGY | Facility: CLINIC | Age: 38
End: 2023-07-07

## 2023-07-08 ENCOUNTER — APPOINTMENT (OUTPATIENT)
Dept: CARDIOLOGY | Facility: CLINIC | Age: 38
End: 2023-07-08

## 2023-07-10 ENCOUNTER — NON-APPOINTMENT (OUTPATIENT)
Age: 38
End: 2023-07-10

## 2023-07-10 ENCOUNTER — APPOINTMENT (OUTPATIENT)
Dept: INFUSION THERAPY | Facility: HOSPITAL | Age: 38
End: 2023-07-10

## 2023-07-11 ENCOUNTER — OUTPATIENT (OUTPATIENT)
Dept: OUTPATIENT SERVICES | Facility: HOSPITAL | Age: 38
LOS: 1 days | End: 2023-07-11
Payer: MEDICAID

## 2023-07-11 ENCOUNTER — TRANSCRIPTION ENCOUNTER (OUTPATIENT)
Age: 38
End: 2023-07-11

## 2023-07-11 ENCOUNTER — APPOINTMENT (OUTPATIENT)
Dept: CARDIOLOGY | Facility: CLINIC | Age: 38
End: 2023-07-11

## 2023-07-11 ENCOUNTER — APPOINTMENT (OUTPATIENT)
Dept: MATERNAL FETAL MEDICINE | Facility: CLINIC | Age: 38
End: 2023-07-11
Payer: MEDICAID

## 2023-07-11 VITALS — DIASTOLIC BLOOD PRESSURE: 77 MMHG | SYSTOLIC BLOOD PRESSURE: 133 MMHG

## 2023-07-11 VITALS
OXYGEN SATURATION: 97 % | DIASTOLIC BLOOD PRESSURE: 92 MMHG | TEMPERATURE: 98.4 F | HEIGHT: 61 IN | HEART RATE: 90 BPM | SYSTOLIC BLOOD PRESSURE: 142 MMHG

## 2023-07-11 DIAGNOSIS — Z90.49 ACQUIRED ABSENCE OF OTHER SPECIFIED PARTS OF DIGESTIVE TRACT: Chronic | ICD-10-CM

## 2023-07-11 DIAGNOSIS — Z87.59 PERSONAL HISTORY OF OTHER COMPLICATIONS OF PREGNANCY, CHILDBIRTH AND THE PUERPERIUM: Chronic | ICD-10-CM

## 2023-07-11 DIAGNOSIS — R51.9 HEADACHE, UNSPECIFIED: ICD-10-CM

## 2023-07-11 PROCEDURE — G0463: CPT

## 2023-07-11 PROCEDURE — 99213 OFFICE O/P EST LOW 20 MIN: CPT | Mod: GE

## 2023-07-11 RX ORDER — MAGNESIUM OXIDE 241.3 MG/1000MG
400 TABLET ORAL DAILY
Qty: 30 | Refills: 0 | Status: ACTIVE | COMMUNITY
Start: 2023-07-11 | End: 1900-01-01

## 2023-07-11 RX ORDER — BLOOD PRESSURE TEST KIT-LARGE
KIT MISCELLANEOUS
Qty: 1 | Refills: 0 | Status: ACTIVE | COMMUNITY
Start: 2023-07-11 | End: 1900-01-01

## 2023-07-19 ENCOUNTER — APPOINTMENT (OUTPATIENT)
Dept: CARDIOLOGY | Facility: CLINIC | Age: 38
End: 2023-07-19

## 2023-07-19 NOTE — HISTORY OF PRESENT ILLNESS
[FreeTextEntry1] : Ms. ELISABETH ARSHAD 38 year old F is here Jul 18, 2023 to establish care in the Women's heart health program.

## 2023-07-24 PROBLEM — D57.00 HB-SS DISEASE WITH CRISIS, UNSPECIFIED: Chronic | Status: ACTIVE | Noted: 2023-06-20

## 2023-07-25 NOTE — DISCHARGE NOTE OB - ADDITIONAL INSTRUCTIONS
no
PLEASE FOLLOW UP AT [facility] NEXT WEEK FOR YOUR BLOOD PRESSURE CHECK.  YOUR APPOINTMENT IS SCHEDULED FOR MONDAY JUNE 4, 2018 AT 3PM.  LOCATION: [address] [place] [address], 2ND FLOOR [address], [facility].  PHONE: [phone]    PLEASE FOLLOW UP AT [facility] IN 6 WEEKS FROM YOUR DELIVERY DATE FOR YOUR POST PARTUM CARE.  PLEASE SCHEDULE THIS APPOINTMENT AT YOUR OWN CONVENIENCE.

## 2023-07-31 LAB — SURGICAL PATHOLOGY STUDY: SIGNIFICANT CHANGE UP

## 2023-08-01 ENCOUNTER — OUTPATIENT (OUTPATIENT)
Dept: OUTPATIENT SERVICES | Facility: HOSPITAL | Age: 38
LOS: 1 days | End: 2023-08-01
Payer: MEDICAID

## 2023-08-01 ENCOUNTER — LABORATORY RESULT (OUTPATIENT)
Age: 38
End: 2023-08-01

## 2023-08-01 ENCOUNTER — APPOINTMENT (OUTPATIENT)
Dept: MATERNAL FETAL MEDICINE | Facility: CLINIC | Age: 38
End: 2023-08-01
Payer: MEDICAID

## 2023-08-01 VITALS
DIASTOLIC BLOOD PRESSURE: 80 MMHG | TEMPERATURE: 98.2 F | HEART RATE: 66 BPM | SYSTOLIC BLOOD PRESSURE: 138 MMHG | HEIGHT: 61 IN | OXYGEN SATURATION: 99 %

## 2023-08-01 DIAGNOSIS — Z90.49 ACQUIRED ABSENCE OF OTHER SPECIFIED PARTS OF DIGESTIVE TRACT: Chronic | ICD-10-CM

## 2023-08-01 DIAGNOSIS — D57.1 SICKLE-CELL DISEASE WITHOUT CRISIS: ICD-10-CM

## 2023-08-01 DIAGNOSIS — Z87.59 PERSONAL HISTORY OF OTHER COMPLICATIONS OF PREGNANCY, CHILDBIRTH AND THE PUERPERIUM: Chronic | ICD-10-CM

## 2023-08-01 DIAGNOSIS — Z87.59 PERSONAL HISTORY OF OTHER COMPLICATIONS OF PREGNANCY, CHILDBIRTH AND THE PUERPERIUM: ICD-10-CM

## 2023-08-01 PROCEDURE — 86147 CARDIOLIPIN ANTIBODY EA IG: CPT

## 2023-08-01 PROCEDURE — G0463: CPT

## 2023-08-01 PROCEDURE — 85598 HEXAGNAL PHOSPH PLTLT NEUTRL: CPT

## 2023-08-01 PROCEDURE — 99213 OFFICE O/P EST LOW 20 MIN: CPT

## 2023-08-01 PROCEDURE — 85730 THROMBOPLASTIN TIME PARTIAL: CPT

## 2023-08-01 PROCEDURE — 86146 BETA-2 GLYCOPROTEIN ANTIBODY: CPT

## 2023-08-01 PROCEDURE — 85613 RUSSELL VIPER VENOM DILUTED: CPT

## 2023-08-02 LAB
B2 GLYCOPROT1 IGA SER QL: 101.3 SAU — HIGH
B2 GLYCOPROT1 IGG SER-ACNC: <5 SGU — SIGNIFICANT CHANGE UP
B2 GLYCOPROT1 IGM SER-ACNC: <5 SMU — SIGNIFICANT CHANGE UP
DRVVT RATIO: 0.88 RATIO — SIGNIFICANT CHANGE UP (ref 0–1.21)
DRVVT SCREEN TO CONFIRM RATIO: SIGNIFICANT CHANGE UP
NORMALIZED SCT PPP-RTO: 0.84 RATIO — SIGNIFICANT CHANGE UP (ref 0–1.16)
NORMALIZED SCT PPP-RTO: SIGNIFICANT CHANGE UP

## 2023-08-03 LAB
APTT BLD: 26.3 SEC — SIGNIFICANT CHANGE UP (ref 24.5–35.6)
CARDIOLIPIN IGM SER-MCNC: <5 GPL — SIGNIFICANT CHANGE UP (ref 0–12.5)
CARDIOLIPIN IGM SER-MCNC: <5 MPL — SIGNIFICANT CHANGE UP (ref 0–12.5)
DEPRECATED CARDIOLIPIN IGA SER: 5.5 APL — SIGNIFICANT CHANGE UP (ref 0–12.5)

## 2023-08-04 ENCOUNTER — RESULT REVIEW (OUTPATIENT)
Age: 38
End: 2023-08-04

## 2023-08-04 ENCOUNTER — OUTPATIENT (OUTPATIENT)
Dept: OUTPATIENT SERVICES | Facility: HOSPITAL | Age: 38
LOS: 1 days | End: 2023-08-04
Payer: MEDICAID

## 2023-08-04 ENCOUNTER — NON-APPOINTMENT (OUTPATIENT)
Age: 38
End: 2023-08-04

## 2023-08-04 ENCOUNTER — APPOINTMENT (OUTPATIENT)
Dept: HEMATOLOGY ONCOLOGY | Facility: CLINIC | Age: 38
End: 2023-08-04

## 2023-08-04 DIAGNOSIS — Z90.49 ACQUIRED ABSENCE OF OTHER SPECIFIED PARTS OF DIGESTIVE TRACT: Chronic | ICD-10-CM

## 2023-08-04 DIAGNOSIS — Z87.59 PERSONAL HISTORY OF OTHER COMPLICATIONS OF PREGNANCY, CHILDBIRTH AND THE PUERPERIUM: Chronic | ICD-10-CM

## 2023-08-04 DIAGNOSIS — D57.1 SICKLE-CELL DISEASE WITHOUT CRISIS: ICD-10-CM

## 2023-08-04 LAB
ANISOCYTOSIS BLD QL: SLIGHT — SIGNIFICANT CHANGE UP
BASOPHILS # BLD AUTO: 0 K/UL — SIGNIFICANT CHANGE UP (ref 0–0.2)
BASOPHILS NFR BLD AUTO: 0 % — SIGNIFICANT CHANGE UP (ref 0–2)
ELLIPTOCYTES BLD QL SMEAR: SLIGHT — SIGNIFICANT CHANGE UP
EOSINOPHIL # BLD AUTO: 0.29 K/UL — SIGNIFICANT CHANGE UP (ref 0–0.5)
EOSINOPHIL NFR BLD AUTO: 3 % — SIGNIFICANT CHANGE UP (ref 0–6)
GIANT PLATELETS BLD QL SMEAR: PRESENT — SIGNIFICANT CHANGE UP
HCT VFR BLD CALC: 20.8 % — CRITICAL LOW (ref 34.5–45)
HGB BLD-MCNC: 7.3 G/DL — LOW (ref 11.5–15.5)
HYPOCHROMIA BLD QL: SLIGHT — SIGNIFICANT CHANGE UP
LG PLATELETS BLD QL AUTO: SLIGHT — SIGNIFICANT CHANGE UP
LYMPHOCYTES # BLD AUTO: 3.57 K/UL — HIGH (ref 1–3.3)
LYMPHOCYTES # BLD AUTO: 37 % — SIGNIFICANT CHANGE UP (ref 13–44)
MCHC RBC-ENTMCNC: 32 PG — SIGNIFICANT CHANGE UP (ref 27–34)
MCHC RBC-ENTMCNC: 35.1 G/DL — SIGNIFICANT CHANGE UP (ref 32–36)
MCV RBC AUTO: 91.2 FL — SIGNIFICANT CHANGE UP (ref 80–100)
MONOCYTES # BLD AUTO: 0.77 K/UL — SIGNIFICANT CHANGE UP (ref 0–0.9)
MONOCYTES NFR BLD AUTO: 8 % — SIGNIFICANT CHANGE UP (ref 2–14)
MYELOCYTES NFR BLD: 1 % — HIGH (ref 0–0)
NEUTROPHILS # BLD AUTO: 4.93 K/UL — SIGNIFICANT CHANGE UP (ref 1.8–7.4)
NEUTROPHILS NFR BLD AUTO: 51 % — SIGNIFICANT CHANGE UP (ref 43–77)
NRBC # BLD: 7 /100 — HIGH (ref 0–0)
NRBC # BLD: SIGNIFICANT CHANGE UP /100 WBCS (ref 0–0)
PLAT MORPH BLD: ABNORMAL
PLATELET # BLD AUTO: 442 K/UL — HIGH (ref 150–400)
POIKILOCYTOSIS BLD QL AUTO: SIGNIFICANT CHANGE UP
POLYCHROMASIA BLD QL SMEAR: SLIGHT — SIGNIFICANT CHANGE UP
RBC # BLD: 2.28 M/UL — LOW (ref 3.8–5.2)
RBC # FLD: 24 % — HIGH (ref 10.3–14.5)
RBC BLD AUTO: ABNORMAL
SCHISTOCYTES BLD QL AUTO: SLIGHT — SIGNIFICANT CHANGE UP
SICKLE CELLS BLD QL SMEAR: SIGNIFICANT CHANGE UP
TARGETS BLD QL SMEAR: SLIGHT — SIGNIFICANT CHANGE UP
WBC # BLD: 9.66 K/UL — SIGNIFICANT CHANGE UP (ref 3.8–10.5)
WBC # FLD AUTO: 9.66 K/UL — SIGNIFICANT CHANGE UP (ref 3.8–10.5)

## 2023-08-07 ENCOUNTER — APPOINTMENT (OUTPATIENT)
Dept: INFUSION THERAPY | Facility: HOSPITAL | Age: 38
End: 2023-08-07

## 2023-08-07 PROCEDURE — 86902 BLOOD TYPE ANTIGEN DONOR EA: CPT

## 2023-08-07 PROCEDURE — 86900 BLOOD TYPING SEROLOGIC ABO: CPT

## 2023-08-07 PROCEDURE — 86901 BLOOD TYPING SEROLOGIC RH(D): CPT

## 2023-08-07 PROCEDURE — 86923 COMPATIBILITY TEST ELECTRIC: CPT

## 2023-08-07 PROCEDURE — 86850 RBC ANTIBODY SCREEN: CPT

## 2023-08-21 ENCOUNTER — LABORATORY RESULT (OUTPATIENT)
Age: 38
End: 2023-08-21

## 2023-08-21 ENCOUNTER — OUTPATIENT (OUTPATIENT)
Dept: OUTPATIENT SERVICES | Facility: HOSPITAL | Age: 38
LOS: 1 days | End: 2023-08-21
Payer: MEDICAID

## 2023-08-21 ENCOUNTER — RX RENEWAL (OUTPATIENT)
Age: 38
End: 2023-08-21

## 2023-08-21 ENCOUNTER — APPOINTMENT (OUTPATIENT)
Dept: OBGYN | Facility: CLINIC | Age: 38
End: 2023-08-21
Payer: MEDICAID

## 2023-08-21 ENCOUNTER — NON-APPOINTMENT (OUTPATIENT)
Age: 38
End: 2023-08-21

## 2023-08-21 VITALS — SYSTOLIC BLOOD PRESSURE: 130 MMHG | DIASTOLIC BLOOD PRESSURE: 78 MMHG | BODY MASS INDEX: 24 KG/M2 | WEIGHT: 127 LBS

## 2023-08-21 DIAGNOSIS — N76.0 ACUTE VAGINITIS: ICD-10-CM

## 2023-08-21 DIAGNOSIS — Z11.3 ENCOUNTER FOR SCREENING FOR INFECTIONS WITH A PREDOMINANTLY SEXUAL MODE OF TRANSMISSION: ICD-10-CM

## 2023-08-21 DIAGNOSIS — Z30.430 ENCOUNTER FOR INSERTION OF INTRAUTERINE CONTRACEPTIVE DEVICE: ICD-10-CM

## 2023-08-21 DIAGNOSIS — Z90.49 ACQUIRED ABSENCE OF OTHER SPECIFIED PARTS OF DIGESTIVE TRACT: Chronic | ICD-10-CM

## 2023-08-21 DIAGNOSIS — Z87.59 PERSONAL HISTORY OF OTHER COMPLICATIONS OF PREGNANCY, CHILDBIRTH AND THE PUERPERIUM: Chronic | ICD-10-CM

## 2023-08-21 LAB
HCG UR QL: NEGATIVE
QUALITY CONTROL: YES

## 2023-08-21 PROCEDURE — 87491 CHLMYD TRACH DNA AMP PROBE: CPT

## 2023-08-21 PROCEDURE — 87591 N.GONORRHOEAE DNA AMP PROB: CPT

## 2023-08-21 PROCEDURE — G0463: CPT

## 2023-08-21 PROCEDURE — 58300 INSERT INTRAUTERINE DEVICE: CPT | Mod: NC

## 2023-08-21 PROCEDURE — 58300 INSERT INTRAUTERINE DEVICE: CPT

## 2023-08-21 PROCEDURE — 99203 OFFICE O/P NEW LOW 30 MIN: CPT | Mod: 25

## 2023-08-21 PROCEDURE — 81025 URINE PREGNANCY TEST: CPT

## 2023-08-21 RX ORDER — NIFEDIPINE 60 MG/1
60 TABLET, FILM COATED, EXTENDED RELEASE ORAL
Qty: 30 | Refills: 0 | Status: ACTIVE | COMMUNITY
Start: 2023-06-26 | End: 1900-01-01

## 2023-08-21 NOTE — ASSESSMENT
[FreeTextEntry1] : Ms. Cevallos is a 38 year old female that is 29 weeks pregnancy with a complicated medical history and presents for a cardiovascular evaluation.\par  \par  Patient carries a history of cholestasis of pregnancy, elevated LFTs, transaminitis and sickle cell disease. \par  Her most recent crisis was in 2023. She received (1) unit of PRBC, IV hydration and pain medications at [facility]. \par  \par  Of note, patient reports some "occasional" elevations in blood pressure. Most recently documented BP on 2023 was :   120/80.\par  \par  \par  #Elevated blood pressure readings without diagnosis of hypertension\par    Review of recent BP measurements are within range\par    Recommended to monitor BP at home once daily and record. If systolic pressures rise above 140, to \par    notify cardio OB for treatment.\par  \par  \par  #Adverse Cardiovascular Risk Factors\par  \par   Personal history of preeclampsia,  labor,  cholestasis of pregnancy, elevated LFTs, transaminitis and sickle cell disease. \par  Her most recent crisis was in 2023. She received (1) unit of PRBC, IV hydration and pain medications at [facility]. \par  \par  Family history of hypertension, type 2 diabetes\par  \par  Recommending a baseline cardiovascular evaluation \par  In-office physical examination and 12 lead EKG\par  Echocardiogram to assess cardiac structure and function\par  \par  - Encouraged patient to participate in healthy activity (walking) and  eating habits, focusing on a Mediterranean style of eating.\par  \par  - Encouraged the patient to find healthy outlets and coping mechanisms to help manage stress, such as reducing workload if possible, spending time with family and friends, engaging in an enjoyable hobby, or using meditation or mindfulness techniques.\par  \par  \par  \par  \par  \par    \par

## 2023-08-21 NOTE — PROCEDURE
[IUD Placement] : intrauterine device (IUD) placement [Time out performed] : Pre-procedure time out performed.  Patient's name, date of birth and procedure confirmed. [Consent Obtained] : Consent obtained [Prevention of Pregnancy] : prevention of pregnancy [Risks] : risks [Benefits] : benefits [Alternatives] : alternatives [Patient] : patient [Infection] : infection [Bleeding] : bleeding [Pain] : pain [Expulsion] : expulsion [Failure] : failure [Uterine Perforation] : uterine perforation [Neg Pregnancy Test] : negative pregnancy test [Neg GC/Chlamydia] : negative GC/Chlamydia [History of Unprotected [place]] : no history of unprotected intercourse [Tenaculum] : Tenaculum [Easy Passage] : Easy passage [Mirena IUD] : Mirena IUD [Tolerated Well] : Patient tolerated the procedure well [No Complications] : No complications [None] : None [LMPDate] : pp- lact amennorhea [de-identified] : 8/2031 [de-identified] : [id number]

## 2023-08-21 NOTE — ASSESSMENT
[FreeTextEntry1] : 37yo  s/p LTCS/Spec- well known to C here for Mirena insertion - r/b/a reviewed including bleeding profiles - preg test neg - Pap up to date 2022 neg/ hpv neg -[ ]GC/CT sent - Pt tolerated procedure well, instructed to use back up x 7 days  RTC 4-6wks for string check Cristiano Floyd, PAC

## 2023-08-22 LAB
C TRACH RRNA SPEC QL NAA+PROBE: SIGNIFICANT CHANGE UP
N GONORRHOEA RRNA SPEC QL NAA+PROBE: SIGNIFICANT CHANGE UP
SPECIMEN SOURCE: SIGNIFICANT CHANGE UP

## 2023-08-23 ENCOUNTER — APPOINTMENT (OUTPATIENT)
Dept: CARDIOLOGY | Facility: CLINIC | Age: 38
End: 2023-08-23
Payer: MEDICAID

## 2023-08-23 VITALS
WEIGHT: 127 LBS | HEIGHT: 61 IN | SYSTOLIC BLOOD PRESSURE: 144 MMHG | OXYGEN SATURATION: 99 % | HEART RATE: 75 BPM | BODY MASS INDEX: 23.98 KG/M2 | DIASTOLIC BLOOD PRESSURE: 96 MMHG

## 2023-08-23 DIAGNOSIS — D57.1 SICKLE-CELL DISEASE W/OUT CRISIS: ICD-10-CM

## 2023-08-23 DIAGNOSIS — Z87.59 PERSONAL HISTORY OF OTHER COMPLICATIONS OF PREGNANCY, CHILDBIRTH AND THE PUERPERIUM: ICD-10-CM

## 2023-08-23 PROCEDURE — 93000 ELECTROCARDIOGRAM COMPLETE: CPT

## 2023-08-23 PROCEDURE — 99214 OFFICE O/P EST MOD 30 MIN: CPT | Mod: 25

## 2023-08-23 RX ORDER — ASPIRIN 81 MG
81 TABLET, DELAYED RELEASE (ENTERIC COATED) ORAL
Refills: 0 | Status: DISCONTINUED | COMMUNITY
End: 2023-08-23

## 2023-08-23 RX ORDER — CAMPHOR 0.45 %
2 GEL (GRAM) TOPICAL
Qty: 1 | Refills: 0 | Status: DISCONTINUED | COMMUNITY
Start: 2023-04-25 | End: 2023-08-23

## 2023-08-23 NOTE — HISTORY OF PRESENT ILLNESS
[FreeTextEntry1] : Patient presents in for post partum follow up - 8 weeks post partum  Ms. Cevallos is a 38 year old female  s/p emergent p C/section on 23 @ GA 30.3 weeks due to fetal bradycardia and severe PEC carries a complicated medical history: cholestasis of pregnancy now resolved, elevated LFTs, transaminitis and sickle cell disease. Denies chest pain, shortness of breath, dizziness, lightheadedness, palpitations or near syncope or syncope, orthopnea, PND and increasing lower extremity edema. Patient has chronic LFTs followed by hepatology. TTE - WNL   BP log at home : 125- 147/ 75- 88  #sPEC - AST/ALT: (222/150 outpt) 162/89->122/69->>97/45; w/ ICP during pregnancy, - Nifedipine 30 mg bid ( hold if BP < 120/80)  Encouraged Patient to monitor BP at home and keep a log and report results back to us for evaluation. Based on the results, we will adjust medications as necessary. Additionally, encouraged heart-healthy diet and exercise as tolerated. EKG with no acute changes.  #Sickle Cell Disease - Last crisis 23  - Heme: H/H stable, transfuse for Hgb <7, c/w folic acid, no sign of acute chest syndrome

## 2023-08-23 NOTE — CARDIOLOGY SUMMARY
[de-identified] : SR 70s  [de-identified] : 5/30/23 : Normal LVEF. No wall motion abnormalities.

## 2023-08-23 NOTE — DISCUSSION/SUMMARY
[EKG obtained to assist in diagnosis and management of assessed problem(s)] : EKG obtained to assist in diagnosis and management of assessed problem(s) [FreeTextEntry1] : Ms. Cevallos is a 38 year old female  s/p emergent p C/section on 23 @ GA 30.3 weeks due to fetal bradycardia and severe PEC carries a complicated medical history: cholestasis of pregnancy now resolved, elevated LFTs, transaminitis and sickle cell disease. TTE - WNL   BP log at home : 125- 147/ 75- 88  #sPEC - AST/ALT: (222/150 outpt) 162/89->122/69->>97/45; w/ ICP during pregnancy, - Nifedipine 30 mg bid ( hold if BP < 120/80)  Encouraged Patient to monitor BP at home and keep a log and report results back to us for evaluation. Based on the results, we will adjust medications as necessary. Additionally, encouraged heart-healthy diet and exercise as tolerated. EKG with no acute changes.  #Sickle Cell Disease - Last crisis 23  - Heme: H/H stable, transfuse for Hgb <7, c/w folic acid, no sign of acute chest syndrome   # f/u heptology

## 2023-08-28 DIAGNOSIS — Z11.3 ENCOUNTER FOR SCREENING FOR INFECTIONS WITH A PREDOMINANTLY SEXUAL MODE OF TRANSMISSION: ICD-10-CM

## 2023-08-28 DIAGNOSIS — Z30.430 ENCOUNTER FOR INSERTION OF INTRAUTERINE CONTRACEPTIVE DEVICE: ICD-10-CM

## 2023-09-06 RX ORDER — NIFEDIPINE 30 MG/1
30 TABLET, EXTENDED RELEASE ORAL
Qty: 120 | Refills: 0 | Status: ACTIVE | COMMUNITY
Start: 2023-08-23 | End: 1900-01-01

## 2023-09-25 ENCOUNTER — OUTPATIENT (OUTPATIENT)
Dept: OUTPATIENT SERVICES | Facility: HOSPITAL | Age: 38
LOS: 1 days | End: 2023-09-25
Payer: MEDICAID

## 2023-09-25 ENCOUNTER — APPOINTMENT (OUTPATIENT)
Dept: OBGYN | Facility: CLINIC | Age: 38
End: 2023-09-25
Payer: MEDICAID

## 2023-09-25 VITALS — BODY MASS INDEX: 24 KG/M2 | SYSTOLIC BLOOD PRESSURE: 124 MMHG | WEIGHT: 127 LBS | DIASTOLIC BLOOD PRESSURE: 80 MMHG

## 2023-09-25 DIAGNOSIS — Z30.431 ENCOUNTER FOR ROUTINE CHECKING OF INTRAUTERINE CONTRACEPTIVE DEVICE: ICD-10-CM

## 2023-09-25 DIAGNOSIS — Z90.49 ACQUIRED ABSENCE OF OTHER SPECIFIED PARTS OF DIGESTIVE TRACT: Chronic | ICD-10-CM

## 2023-09-25 DIAGNOSIS — N76.0 ACUTE VAGINITIS: ICD-10-CM

## 2023-09-25 DIAGNOSIS — Z87.59 PERSONAL HISTORY OF OTHER COMPLICATIONS OF PREGNANCY, CHILDBIRTH AND THE PUERPERIUM: Chronic | ICD-10-CM

## 2023-09-25 PROCEDURE — 99213 OFFICE O/P EST LOW 20 MIN: CPT

## 2023-09-25 PROCEDURE — G0463: CPT

## 2023-10-09 DIAGNOSIS — Z30.431 ENCOUNTER FOR ROUTINE CHECKING OF INTRAUTERINE CONTRACEPTIVE DEVICE: ICD-10-CM

## 2023-10-25 ENCOUNTER — APPOINTMENT (OUTPATIENT)
Dept: CARDIOLOGY | Facility: CLINIC | Age: 38
End: 2023-10-25

## 2024-02-09 NOTE — DISCHARGE NOTE OB - PROVIDER TOKENS
IUGR
FREE:[LAST:[[facility]],PHONE:[phone]],FAX:[(   [phone]    -],ADDRESS:[[address] [place] [address]]]

## 2024-03-20 NOTE — OB RN INTRAOPERATIVE NOTE - NS_ELECTROSURGICALUNITBIOMEDNUMBER_OBGYN_ALL_OB_FT
Multifactorial.  Lung disease likely in the setting of smoking 2 packs/day, and exam suggestive of bronchitis.  No volume overload on exam.  Blood pressure controlled.  Deconditioning. Plan:  Patient counseled in regards to heart healthy, low fat/ low cholesterol/low sat diet, daily exercise for 30 minutes, low to moderate intensity, and weight loss.  Smoking cessation discussed extensively  Pulm referral as patient lost her pulmonologist  Blood pressure management as per hypertension   [id number]

## 2024-05-17 NOTE — REASON FOR VISIT
Second attempt today to reach the patient.     Writer dialed patient to reschedule appointment on 05/29/2024 d/t schedule conflict .  NO answer. Unable to LVM d/t it not being set up. Will send a letter        [Hypertension] : hypertension

## 2024-08-14 NOTE — PATIENT PROFILE OB - SOURCE OF INFORMATION, OB PROFILE
Left detailed message for patient informing per Dr. Martinez, she can schedule an appointment for this Friday, If she wants to be seen sooner, she can make an appointment and see the nurse practitioner tomorrow.   To call our office to schedule appointment.    patient

## 2025-07-10 ENCOUNTER — NON-APPOINTMENT (OUTPATIENT)
Age: 40
End: 2025-07-10